# Patient Record
Sex: FEMALE | Race: WHITE | Employment: OTHER | URBAN - METROPOLITAN AREA
[De-identification: names, ages, dates, MRNs, and addresses within clinical notes are randomized per-mention and may not be internally consistent; named-entity substitution may affect disease eponyms.]

---

## 2018-03-26 PROBLEM — M47.812 CERVICAL FACET JOINT SYNDROME: Status: ACTIVE | Noted: 2018-03-26

## 2018-03-26 PROBLEM — G89.29 CHRONIC BILATERAL LOW BACK PAIN WITH RIGHT-SIDED SCIATICA: Status: ACTIVE | Noted: 2018-03-26

## 2018-03-26 PROBLEM — M54.2 NECK PAIN: Status: ACTIVE | Noted: 2018-03-26

## 2018-03-26 PROBLEM — M54.41 CHRONIC BILATERAL LOW BACK PAIN WITH RIGHT-SIDED SCIATICA: Status: ACTIVE | Noted: 2018-03-26

## 2018-03-26 PROBLEM — M51.369 LUMBAR DEGENERATIVE DISC DISEASE: Status: ACTIVE | Noted: 2018-03-26

## 2018-03-26 PROBLEM — M50.30 DEGENERATIVE CERVICAL DISC: Status: ACTIVE | Noted: 2018-03-26

## 2018-03-26 PROBLEM — M47.816 LUMBAR FACET ARTHROPATHY: Status: ACTIVE | Noted: 2018-03-26

## 2018-03-26 PROBLEM — M48.062 SPINAL STENOSIS OF LUMBAR REGION WITH NEUROGENIC CLAUDICATION: Status: ACTIVE | Noted: 2018-03-26

## 2018-03-26 PROBLEM — M51.36 LUMBAR DEGENERATIVE DISC DISEASE: Status: ACTIVE | Noted: 2018-03-26

## 2018-04-16 ENCOUNTER — OFFICE VISIT (OUTPATIENT)
Dept: PAIN MANAGEMENT | Age: 68
End: 2018-04-16
Payer: MEDICARE

## 2018-04-16 VITALS
HEART RATE: 74 BPM | TEMPERATURE: 98.1 F | DIASTOLIC BLOOD PRESSURE: 70 MMHG | SYSTOLIC BLOOD PRESSURE: 130 MMHG | RESPIRATION RATE: 18 BRPM

## 2018-04-16 DIAGNOSIS — M50.30 DEGENERATIVE CERVICAL DISC: Primary | ICD-10-CM

## 2018-04-16 DIAGNOSIS — M54.5 CHRONIC BILATERAL LOW BACK PAIN, WITH SCIATICA PRESENCE UNSPECIFIED: Chronic | ICD-10-CM

## 2018-04-16 DIAGNOSIS — M48.062 SPINAL STENOSIS OF LUMBAR REGION WITH NEUROGENIC CLAUDICATION: ICD-10-CM

## 2018-04-16 DIAGNOSIS — M51.36 LUMBAR DEGENERATIVE DISC DISEASE: ICD-10-CM

## 2018-04-16 DIAGNOSIS — M47.816 LUMBAR FACET ARTHROPATHY: ICD-10-CM

## 2018-04-16 DIAGNOSIS — G89.29 CHRONIC BILATERAL LOW BACK PAIN, WITH SCIATICA PRESENCE UNSPECIFIED: Chronic | ICD-10-CM

## 2018-04-16 DIAGNOSIS — M54.2 NECK PAIN: ICD-10-CM

## 2018-04-16 DIAGNOSIS — M47.812 CERVICAL FACET JOINT SYNDROME: ICD-10-CM

## 2018-04-16 PROCEDURE — 99213 OFFICE O/P EST LOW 20 MIN: CPT

## 2018-04-16 PROCEDURE — 99213 OFFICE O/P EST LOW 20 MIN: CPT | Performed by: PAIN MEDICINE

## 2018-04-16 ASSESSMENT — PATIENT HEALTH QUESTIONNAIRE - PHQ9
2. FEELING DOWN, DEPRESSED OR HOPELESS: 0
SUM OF ALL RESPONSES TO PHQ QUESTIONS 1-9: 0
SUM OF ALL RESPONSES TO PHQ9 QUESTIONS 1 & 2: 0
1. LITTLE INTEREST OR PLEASURE IN DOING THINGS: 0

## 2018-04-23 ENCOUNTER — HOSPITAL ENCOUNTER (OUTPATIENT)
Dept: OPERATING ROOM | Age: 68
Discharge: HOME | End: 2018-04-23
Payer: MEDICARE

## 2018-04-23 ENCOUNTER — HOSPITAL ENCOUNTER (OUTPATIENT)
Age: 68
Setting detail: OUTPATIENT SURGERY
Discharge: HOME | End: 2018-04-23
Attending: PAIN MEDICINE | Admitting: PAIN MEDICINE
Payer: MEDICARE

## 2018-04-23 VITALS
HEART RATE: 63 BPM | RESPIRATION RATE: 16 BRPM | OXYGEN SATURATION: 94 % | DIASTOLIC BLOOD PRESSURE: 73 MMHG | SYSTOLIC BLOOD PRESSURE: 184 MMHG

## 2018-04-23 DIAGNOSIS — M54.16 LUMBAR RADICULOPATHY: ICD-10-CM

## 2018-04-23 PROCEDURE — 7100000011 HC PHASE II RECOVERY - ADDTL 15 MIN: Performed by: PAIN MEDICINE

## 2018-04-23 PROCEDURE — 62323 NJX INTERLAMINAR LMBR/SAC: CPT | Performed by: PAIN MEDICINE

## 2018-04-23 PROCEDURE — 6360000004 HC RX CONTRAST MEDICATION: Performed by: PAIN MEDICINE

## 2018-04-23 PROCEDURE — 2500000003 HC RX 250 WO HCPCS: Performed by: PAIN MEDICINE

## 2018-04-23 PROCEDURE — 6360000002 HC RX W HCPCS: Performed by: PAIN MEDICINE

## 2018-04-23 PROCEDURE — 3600000005 HC SURGERY LEVEL 5 BASE: Performed by: PAIN MEDICINE

## 2018-04-23 PROCEDURE — 2709999900 HC NON-CHARGEABLE SUPPLY: Performed by: PAIN MEDICINE

## 2018-04-23 PROCEDURE — 3209999900 FLUORO FOR SURGICAL PROCEDURES

## 2018-04-23 PROCEDURE — 7100000010 HC PHASE II RECOVERY - FIRST 15 MIN: Performed by: PAIN MEDICINE

## 2018-04-23 RX ORDER — LIDOCAINE HYDROCHLORIDE 5 MG/ML
INJECTION, SOLUTION INFILTRATION; INTRAVENOUS PRN
Status: DISCONTINUED | OUTPATIENT
Start: 2018-04-23 | End: 2018-04-23 | Stop reason: HOSPADM

## 2018-04-23 ASSESSMENT — PAIN - FUNCTIONAL ASSESSMENT: PAIN_FUNCTIONAL_ASSESSMENT: 0-10

## 2018-04-23 ASSESSMENT — PAIN SCALES - GENERAL
PAINLEVEL_OUTOF10: 0
PAINLEVEL_OUTOF10: 0

## 2018-04-23 ASSESSMENT — PAIN DESCRIPTION - DESCRIPTORS: DESCRIPTORS: ACHING

## 2018-05-15 ENCOUNTER — OFFICE VISIT (OUTPATIENT)
Dept: PAIN MANAGEMENT | Age: 68
End: 2018-05-15
Payer: MEDICARE

## 2018-05-15 VITALS
SYSTOLIC BLOOD PRESSURE: 108 MMHG | TEMPERATURE: 98.3 F | DIASTOLIC BLOOD PRESSURE: 70 MMHG | RESPIRATION RATE: 16 BRPM | HEART RATE: 72 BPM

## 2018-05-15 DIAGNOSIS — M54.2 NECK PAIN: Primary | ICD-10-CM

## 2018-05-15 DIAGNOSIS — M54.5 CHRONIC BILATERAL LOW BACK PAIN, WITH SCIATICA PRESENCE UNSPECIFIED: ICD-10-CM

## 2018-05-15 DIAGNOSIS — M50.30 DEGENERATIVE CERVICAL DISC: ICD-10-CM

## 2018-05-15 DIAGNOSIS — M51.36 LUMBAR DEGENERATIVE DISC DISEASE: ICD-10-CM

## 2018-05-15 DIAGNOSIS — M47.816 LUMBAR FACET ARTHROPATHY: ICD-10-CM

## 2018-05-15 DIAGNOSIS — G89.29 CHRONIC BILATERAL LOW BACK PAIN, WITH SCIATICA PRESENCE UNSPECIFIED: ICD-10-CM

## 2018-05-15 DIAGNOSIS — M54.41 CHRONIC BILATERAL LOW BACK PAIN WITH RIGHT-SIDED SCIATICA: ICD-10-CM

## 2018-05-15 DIAGNOSIS — M47.812 CERVICAL FACET JOINT SYNDROME: ICD-10-CM

## 2018-05-15 DIAGNOSIS — M51.36 DDD (DEGENERATIVE DISC DISEASE), LUMBAR: ICD-10-CM

## 2018-05-15 DIAGNOSIS — G89.29 CHRONIC BILATERAL LOW BACK PAIN WITH RIGHT-SIDED SCIATICA: ICD-10-CM

## 2018-05-15 DIAGNOSIS — M48.061 NEURAL FORAMINAL STENOSIS OF LUMBAR SPINE: ICD-10-CM

## 2018-05-15 DIAGNOSIS — M48.062 SPINAL STENOSIS OF LUMBAR REGION WITH NEUROGENIC CLAUDICATION: ICD-10-CM

## 2018-05-15 PROCEDURE — 99213 OFFICE O/P EST LOW 20 MIN: CPT

## 2018-05-15 PROCEDURE — 99213 OFFICE O/P EST LOW 20 MIN: CPT | Performed by: PAIN MEDICINE

## 2018-06-20 ENCOUNTER — HOSPITAL ENCOUNTER (OUTPATIENT)
Dept: SLEEP CENTER | Age: 68
Discharge: HOME | End: 2018-06-20
Payer: MEDICARE

## 2018-06-20 DIAGNOSIS — G47.33 OSA (OBSTRUCTIVE SLEEP APNEA): Primary | ICD-10-CM

## 2018-06-20 PROCEDURE — 95810 POLYSOM 6/> YRS 4/> PARAM: CPT

## 2018-06-28 ENCOUNTER — HOSPITAL ENCOUNTER (OUTPATIENT)
Dept: GENERAL RADIOLOGY | Age: 68
Discharge: HOME | End: 2018-06-30
Payer: MEDICARE

## 2018-06-28 ENCOUNTER — HOSPITAL ENCOUNTER (OUTPATIENT)
Age: 68
Discharge: HOME | End: 2018-06-28
Payer: MEDICARE

## 2018-06-28 ENCOUNTER — HOSPITAL ENCOUNTER (OUTPATIENT)
Age: 68
Discharge: HOME | End: 2018-06-30
Payer: MEDICARE

## 2018-06-28 DIAGNOSIS — R05.9 COUGH: ICD-10-CM

## 2018-06-28 LAB
ALBUMIN SERPL-MCNC: 3.7 G/DL (ref 3.5–5.2)
ALP BLD-CCNC: 66 U/L (ref 35–104)
ALT SERPL-CCNC: 19 U/L (ref 0–32)
ANION GAP SERPL CALCULATED.3IONS-SCNC: 13 MMOL/L (ref 7–16)
AST SERPL-CCNC: 22 U/L (ref 0–31)
BASOPHILS ABSOLUTE: 0.04 E9/L (ref 0–0.2)
BASOPHILS RELATIVE PERCENT: 0.8 % (ref 0–2)
BILIRUB SERPL-MCNC: 0.3 MG/DL (ref 0–1.2)
BUN BLDV-MCNC: 14 MG/DL (ref 8–23)
CALCIUM SERPL-MCNC: 9.4 MG/DL (ref 8.6–10.2)
CHLORIDE BLD-SCNC: 102 MMOL/L (ref 98–107)
CHOLESTEROL, TOTAL: 160 MG/DL (ref 0–199)
CO2: 27 MMOL/L (ref 22–29)
CREAT SERPL-MCNC: 0.9 MG/DL (ref 0.5–1)
EOSINOPHILS ABSOLUTE: 0.15 E9/L (ref 0.05–0.5)
EOSINOPHILS RELATIVE PERCENT: 3.1 % (ref 0–6)
GFR AFRICAN AMERICAN: >60
GFR NON-AFRICAN AMERICAN: >60 ML/MIN/1.73
GLUCOSE BLD-MCNC: 132 MG/DL (ref 74–109)
HBA1C MFR BLD: 6 % (ref 4–5.6)
HCT VFR BLD CALC: 39 % (ref 34–48)
HDLC SERPL-MCNC: 66 MG/DL
HEMOGLOBIN: 12.4 G/DL (ref 11.5–15.5)
IMMATURE GRANULOCYTES #: 0 E9/L
IMMATURE GRANULOCYTES %: 0 % (ref 0–5)
LDL CHOLESTEROL CALCULATED: 73 MG/DL (ref 0–99)
LYMPHOCYTES ABSOLUTE: 1.45 E9/L (ref 1.5–4)
LYMPHOCYTES RELATIVE PERCENT: 30.2 % (ref 20–42)
MCH RBC QN AUTO: 27.9 PG (ref 26–35)
MCHC RBC AUTO-ENTMCNC: 31.8 % (ref 32–34.5)
MCV RBC AUTO: 87.8 FL (ref 80–99.9)
MONOCYTES ABSOLUTE: 0.4 E9/L (ref 0.1–0.95)
MONOCYTES RELATIVE PERCENT: 8.3 % (ref 2–12)
NEUTROPHILS ABSOLUTE: 2.76 E9/L (ref 1.8–7.3)
NEUTROPHILS RELATIVE PERCENT: 57.6 % (ref 43–80)
PDW BLD-RTO: 14.5 FL (ref 11.5–15)
PLATELET # BLD: 261 E9/L (ref 130–450)
PMV BLD AUTO: 11.3 FL (ref 7–12)
POTASSIUM SERPL-SCNC: 4.3 MMOL/L (ref 3.5–5)
RBC # BLD: 4.44 E12/L (ref 3.5–5.5)
SODIUM BLD-SCNC: 142 MMOL/L (ref 132–146)
T4 TOTAL: 6.2 MCG/DL (ref 4.5–11.7)
TOTAL PROTEIN: 6.9 G/DL (ref 6.4–8.3)
TRIGL SERPL-MCNC: 104 MG/DL (ref 0–149)
TSH SERPL DL<=0.05 MIU/L-ACNC: 3.37 UIU/ML (ref 0.27–4.2)
URIC ACID, SERUM: 5.6 MG/DL (ref 2.4–5.7)
VITAMIN B-12: 530 PG/ML (ref 211–946)
VITAMIN D 25-HYDROXY: 54 NG/ML (ref 30–100)
VLDLC SERPL CALC-MCNC: 21 MG/DL
WBC # BLD: 4.8 E9/L (ref 4.5–11.5)

## 2018-06-28 PROCEDURE — 80061 LIPID PANEL: CPT

## 2018-06-28 PROCEDURE — 83036 HEMOGLOBIN GLYCOSYLATED A1C: CPT

## 2018-06-28 PROCEDURE — 84550 ASSAY OF BLOOD/URIC ACID: CPT

## 2018-06-28 PROCEDURE — 82607 VITAMIN B-12: CPT

## 2018-06-28 PROCEDURE — 84436 ASSAY OF TOTAL THYROXINE: CPT

## 2018-06-28 PROCEDURE — 80053 COMPREHEN METABOLIC PANEL: CPT

## 2018-06-28 PROCEDURE — 36415 COLL VENOUS BLD VENIPUNCTURE: CPT

## 2018-06-28 PROCEDURE — 85025 COMPLETE CBC W/AUTO DIFF WBC: CPT

## 2018-06-28 PROCEDURE — 71046 X-RAY EXAM CHEST 2 VIEWS: CPT

## 2018-06-28 PROCEDURE — 84443 ASSAY THYROID STIM HORMONE: CPT

## 2018-06-28 PROCEDURE — 82306 VITAMIN D 25 HYDROXY: CPT

## 2018-07-02 ENCOUNTER — TELEPHONE (OUTPATIENT)
Dept: PAIN MANAGEMENT | Age: 68
End: 2018-07-02

## 2018-07-02 DIAGNOSIS — M54.16 LUMBAR RADICULOPATHY: Primary | ICD-10-CM

## 2018-07-03 NOTE — TELEPHONE ENCOUNTER
Carmina Sheehan notified of MRI, as soon as she has it scheduled she will call and make an appointment to see Dr. Shreyas Magana.

## 2018-07-17 ENCOUNTER — PREP FOR PROCEDURE (OUTPATIENT)
Dept: PAIN MANAGEMENT | Age: 68
End: 2018-07-17

## 2018-07-17 ENCOUNTER — OFFICE VISIT (OUTPATIENT)
Dept: PAIN MANAGEMENT | Age: 68
End: 2018-07-17
Payer: MEDICARE

## 2018-07-17 VITALS
TEMPERATURE: 98.4 F | WEIGHT: 250 LBS | SYSTOLIC BLOOD PRESSURE: 124 MMHG | BODY MASS INDEX: 41.65 KG/M2 | HEART RATE: 76 BPM | RESPIRATION RATE: 16 BRPM | DIASTOLIC BLOOD PRESSURE: 76 MMHG | HEIGHT: 65 IN

## 2018-07-17 DIAGNOSIS — M51.36 LUMBAR DEGENERATIVE DISC DISEASE: ICD-10-CM

## 2018-07-17 DIAGNOSIS — M48.062 SPINAL STENOSIS OF LUMBAR REGION WITH NEUROGENIC CLAUDICATION: ICD-10-CM

## 2018-07-17 DIAGNOSIS — M54.41 CHRONIC BILATERAL LOW BACK PAIN WITH RIGHT-SIDED SCIATICA: ICD-10-CM

## 2018-07-17 DIAGNOSIS — G89.29 CHRONIC BILATERAL LOW BACK PAIN WITH RIGHT-SIDED SCIATICA: ICD-10-CM

## 2018-07-17 DIAGNOSIS — M47.812 CERVICAL FACET JOINT SYNDROME: ICD-10-CM

## 2018-07-17 DIAGNOSIS — M47.816 LUMBAR FACET ARTHROPATHY: Primary | ICD-10-CM

## 2018-07-17 DIAGNOSIS — M50.30 DEGENERATIVE CERVICAL DISC: ICD-10-CM

## 2018-07-17 DIAGNOSIS — G89.29 CHRONIC BILATERAL LOW BACK PAIN, WITH SCIATICA PRESENCE UNSPECIFIED: ICD-10-CM

## 2018-07-17 DIAGNOSIS — M51.36 DDD (DEGENERATIVE DISC DISEASE), LUMBAR: Chronic | ICD-10-CM

## 2018-07-17 DIAGNOSIS — M54.16 LUMBAR RADICULOPATHY: Chronic | ICD-10-CM

## 2018-07-17 DIAGNOSIS — M54.5 CHRONIC BILATERAL LOW BACK PAIN, WITH SCIATICA PRESENCE UNSPECIFIED: ICD-10-CM

## 2018-07-17 DIAGNOSIS — M54.2 NECK PAIN: ICD-10-CM

## 2018-07-17 PROCEDURE — 99214 OFFICE O/P EST MOD 30 MIN: CPT | Performed by: PAIN MEDICINE

## 2018-07-17 RX ORDER — SODIUM CHLORIDE 0.9 % (FLUSH) 0.9 %
10 SYRINGE (ML) INJECTION EVERY 12 HOURS SCHEDULED
Status: CANCELLED | OUTPATIENT
Start: 2018-07-17 | End: 2019-07-17

## 2018-07-17 RX ORDER — SODIUM CHLORIDE 0.9 % (FLUSH) 0.9 %
10 SYRINGE (ML) INJECTION PRN
Status: CANCELLED | OUTPATIENT
Start: 2018-07-17 | End: 2019-07-17

## 2018-07-17 RX ORDER — PANTOPRAZOLE SODIUM 40 MG/1
40 TABLET, DELAYED RELEASE ORAL DAILY
COMMUNITY
End: 2018-12-27

## 2018-07-17 NOTE — PROGRESS NOTES
[address] [facility], [address] [place] [address] Sergio  [phone]    Follow up Note      Hattie Denis     Date of Visit:  7/17/2018    CC:  Patient presents for follow up   Chief Complaint   Patient presents with    Follow-up    Back Pain     low back pain    Pain    Neck Pain    Leg Pain     right leg pain       HPI:    Pain is unchanged   On average,pain is perceived as moderate (4-6 pain scale). Change in quality of symptoms:no. Patient satisfaction with analgesia:poor. Medication side effects:not applicable . Recent diagnostic testing:Lumbar spine MRI  Recent interventional procedures:none    She has been on anticoagulation medications to include ASA and has not been on herbal supplements. She is diabetic.     Imaging:  Lumbar spine CT 2014  IMPRESSION: Multilevel disc bulging and facet joint arthropathy   with spondylosis resulting in varying degrees of foraminal and   spinal canal stenosis    Cervical spine Xray 06/2015  Multilevel spondylosis  Lumbar spine MRI 07/2018  1. Levoconvex curvature with multilevel lumbar spondylosis, as   described above, most pronounced at L4-L5, where there is a diffuse   disc bulge with facet hypertrophy resulting in severe central canal   narrowing. 2. Mild central canal narrowing at L2-L3 and L3-L4. 3. Mild to moderate right foraminal narrowing at L2-L3. 4. Mild bilateral foraminal narrowing at L4-L5. Mild right foraminal   narrowing at T12-L1 and L5-S1.      Previous treatments: Physical Therapy, Epidural Steroid Injection and medications. .         Potential Aberrant Drug-Related Behavior: No    Urine Drug Screening: None (no medications)    OARRS report:  04/2018 consistent   05/2018 consistent   07/2018 consistent     Allergies   Allergen Reactions    Penicillins Rash       Prior to Admission medications    Medication Sig Start Date End Date Taking?  Authorizing Provider   pantoprazole (PROTONIX) 40 MG tablet Take 40 mg by mouth daily   Yes Historical Provider, MD   gabapentin (NEURONTIN) 300 MG capsule Take 300 mg by mouth 3 times daily Take am dos 01/12 . Yes Historical Provider, MD   metFORMIN (GLUCOPHAGE-XR) 500 MG extended release tablet Take 500 mg by mouth 2 times daily   Yes Historical Provider, MD   pravastatin (PRAVACHOL) 20 MG tablet Take 20 mg by mouth nightly   Yes Historical Provider, MD   venlafaxine 37.5 MG extended release tablet Take 37.5 mg by mouth 2 times daily Take am dos 01/12   Yes Historical Provider, MD   aspirin 81 MG tablet Take 81 mg by mouth daily Per PCP; contact Dr Yesy Urban re:  preop instructions   Yes Historical Provider, MD   vitamin D (CHOLECALCIFEROL) 1000 UNIT TABS tablet Take 1,000 Units by mouth 2 times daily Ld 01/06   Yes Historical Provider, MD   lisinopril (PRINIVIL;ZESTRIL) 10 MG tablet Take 10 mg by mouth daily Instructed to take am of procedure, 01/12   Yes Historical Provider, MD   Multiple Vitamin (MULTIVITAMIN PO) Take 1 tablet by mouth daily LD 01/06   Yes Historical Provider, MD   fish oil-omega-3 fatty acids 1000 MG capsule Take 2 g by mouth 2 times daily LD 01/06   Yes Historical Provider, MD   Calcium Carbonate-Vit D-Min (CALCIUM 1200 PO) Take 600 mg by mouth 2 times daily LD 01/06   Yes Historical Provider, MD   Sod Fluoride-Ca Carbonate (FLORICAL PO) Take 1 tablet by mouth 2 times daily    Historical Provider, MD          REVIEW OF SYSTEMS:     Tosha Huber denies fever/chills, chest pain, shortness of breath, new bowel or bladder complaints or suicidal ideations. All other review of systems was negative. PHYSICAL EXAMINATION:      /76   Pulse 76   Temp 98.4 °F (36.9 °C) (Oral)   Resp 16   Ht 5' 5\" (1.651 m)   Wt 250 lb (113.4 kg)   BMI 41.60 kg/m²      General:       General appearance: awake, alert, oriented   elderly, pleasant and well-hydrated.    , in moderate distress and A & O x3  Build:Overweight  Function:Rises from a seated position with reflex2+  Right Biceps reflex2+  Left Biceps reflex2+  Right Triceps reflex2+  Left Triceps reflex2+  Right Quadriceps reflex2+  Left Quadriceps reflexNOT DONE  Right Achilles reflex0  Left Achilles reflex0  Gait:antalgic     Dermatology:     Skin:no unusual rashes and no skin lesions      Assessment/Plan:  Chronic neck and low back pain   Low back is consistent with degenerative spinal stenosis with neurogenic claudications  Results of lumbar spine MRI were discussed with the patient  Discussed diagnostic lumbar facet MBB on the Right side at L2,3,4 MBB, patient agrees will schedule   Will consider referral to a neurosurgeon if less than expected response  Axial neck pain, will schedule patient for cervical Left C3,4,5 MBB after addressing her low back pain  OARRS report reviewed  Patient encouraged to stay active and to lose weight  Treatment plan discussed with the patient including medications and procedure side effects            ccreferring fernanda Cordon M.D.

## 2018-08-09 ENCOUNTER — HOSPITAL ENCOUNTER (OUTPATIENT)
Age: 68
Setting detail: OUTPATIENT SURGERY
Discharge: HOME | End: 2018-08-09
Attending: PAIN MEDICINE | Admitting: PAIN MEDICINE
Payer: MEDICARE

## 2018-08-09 ENCOUNTER — HOSPITAL ENCOUNTER (OUTPATIENT)
Dept: OPERATING ROOM | Age: 68
Discharge: HOME | End: 2018-08-09
Payer: MEDICARE

## 2018-08-09 VITALS
HEART RATE: 71 BPM | DIASTOLIC BLOOD PRESSURE: 86 MMHG | RESPIRATION RATE: 18 BRPM | OXYGEN SATURATION: 93 % | SYSTOLIC BLOOD PRESSURE: 172 MMHG

## 2018-08-09 DIAGNOSIS — M47.896 OTHER OSTEOARTHRITIS OF SPINE, LUMBAR REGION: ICD-10-CM

## 2018-08-09 PROCEDURE — 6360000002 HC RX W HCPCS: Performed by: PAIN MEDICINE

## 2018-08-09 PROCEDURE — 2709999900 HC NON-CHARGEABLE SUPPLY: Performed by: PAIN MEDICINE

## 2018-08-09 PROCEDURE — 7100000011 HC PHASE II RECOVERY - ADDTL 15 MIN: Performed by: PAIN MEDICINE

## 2018-08-09 PROCEDURE — 3209999900 FLUORO FOR SURGICAL PROCEDURES

## 2018-08-09 PROCEDURE — 64495 INJ PARAVERT F JNT L/S 3 LEV: CPT | Performed by: PAIN MEDICINE

## 2018-08-09 PROCEDURE — 64494 INJ PARAVERT F JNT L/S 2 LEV: CPT | Performed by: PAIN MEDICINE

## 2018-08-09 PROCEDURE — 2500000003 HC RX 250 WO HCPCS: Performed by: PAIN MEDICINE

## 2018-08-09 PROCEDURE — 64493 INJ PARAVERT F JNT L/S 1 LEV: CPT | Performed by: PAIN MEDICINE

## 2018-08-09 PROCEDURE — 7100000010 HC PHASE II RECOVERY - FIRST 15 MIN: Performed by: PAIN MEDICINE

## 2018-08-09 PROCEDURE — 3600000005 HC SURGERY LEVEL 5 BASE: Performed by: PAIN MEDICINE

## 2018-08-09 RX ORDER — LIDOCAINE HYDROCHLORIDE 5 MG/ML
INJECTION, SOLUTION INFILTRATION; INTRAVENOUS PRN
Status: DISCONTINUED | OUTPATIENT
Start: 2018-08-09 | End: 2018-08-09 | Stop reason: HOSPADM

## 2018-08-09 RX ORDER — SODIUM CHLORIDE 0.9 % (FLUSH) 0.9 %
10 SYRINGE (ML) INJECTION PRN
Status: DISCONTINUED | OUTPATIENT
Start: 2018-08-09 | End: 2018-08-09 | Stop reason: HOSPADM

## 2018-08-09 RX ORDER — SODIUM CHLORIDE 0.9 % (FLUSH) 0.9 %
10 SYRINGE (ML) INJECTION EVERY 12 HOURS SCHEDULED
Status: DISCONTINUED | OUTPATIENT
Start: 2018-08-09 | End: 2018-08-09 | Stop reason: HOSPADM

## 2018-08-09 ASSESSMENT — PAIN - FUNCTIONAL ASSESSMENT: PAIN_FUNCTIONAL_ASSESSMENT: 0-10

## 2018-08-09 ASSESSMENT — PAIN DESCRIPTION - DESCRIPTORS: DESCRIPTORS: ACHING;BURNING;CONSTANT

## 2018-08-09 ASSESSMENT — PAIN SCALES - GENERAL: PAINLEVEL_OUTOF10: 4

## 2018-08-09 NOTE — OP NOTE
where the medial branch traverses under fluoroscopic guidance. Once bone was contacted and negative aspiration was confirmed. A solution of 0.25% marcaine with 40 DepoMedrol 3 cc was then injected and distributed equally at each level. Following the procedure Janes Martinez noted improvement of previous pain symptoms. Disposition the patient tolerated the procedure well and there were no complications . Vital signs remained stable throughout the procedure. The patient was escorted to the recovery area where they remained until discharge and written discharge instructions for the procedure were given. Plan: Janes Martinez will return to our pain management center as scheduled.      Denise Garcia MD

## 2018-08-09 NOTE — H&P
[address]  [address] [facility], [address] [place] [address] Sergio  [phone]    Procedure History & Physical      Polodoug Maggy     HPI:    Patient  is here for axial low back pain for Right L2,3,4 MBB   Labs/imaging studies reviewed   All question and concerns addressed including R/B/A associated with the procedure    Past Medical History:   Diagnosis Date    Arthritis     Asthma     no inhaler, controlled ; hist years ago of asthmatic bronchitis, denies problems since    Depression     Diabetes mellitus ([address]     History of blood transfusion     Hyperlipidemia     Hypertension     Right cataract 2018       Past Surgical History:   Procedure Laterality Date    ABDOMEN SURGERY      CATARACT REMOVAL WITH IMPLANT Right 2018     SECTION      COLONOSCOPY      EYE SURGERY      bilateral cataracts    FRACTURE SURGERY      left foot    JOINT REPLACEMENT      total left knee    KNEE ARTHROPLASTY Left 2013    totoal joint arthroplasty left knee with platelet gel and nerve block    NERVE BLOCK  7 11 12    caudal #1    NERVE BLOCK  7  12    caudal #2    NERVE BLOCK  12    therapeutic caudal with epiduragram    NERVE BLOCK  2018    lumbar epidural injection #1    POLYPECTOMY      removed from uterus    [place] ALLIE NOSE/CLEFT LIP/TIP N/A 2018    L3-4 LUMBAR EPIDURAL STEROID INJECTION #1 performed by Alberto Brown MD at [facility]         Prior to Admission medications    Medication Sig Start Date End Date Taking? Authorizing Provider   pantoprazole (PROTONIX) 40 MG tablet Take 40 mg by mouth daily   Yes Historical Provider, MD   Sod Fluoride-Ca Carbonate (FLORICAL PO) Take 1 tablet by mouth 2 times daily   Yes Historical Provider, MD   gabapentin (NEURONTIN) 300 MG capsule Take 300 mg by mouth 3 times daily Take am dos  .    Yes Historical Provider, MD   metFORMIN (GLUCOPHAGE-XR) 500 MG extended release tablet Take 500 mg by mouth 2 times daily   Yes Historical Provider, MD   pravastatin (PRAVACHOL) 20 MG tablet Take 20 mg by mouth nightly   Yes Historical Provider, MD   venlafaxine 37.5 MG extended release tablet Take 37.5 mg by mouth 2 times daily Take am dos 01/12   Yes Historical Provider, MD   aspirin 81 MG tablet Take 81 mg by mouth daily Per PCP; contact Dr Lisa Pardo re:  preop instructions   Yes Historical Provider, MD   vitamin D (CHOLECALCIFEROL) 1000 UNIT TABS tablet Take 1,000 Units by mouth 2 times daily Ld 01/06   Yes Historical Provider, MD   lisinopril (PRINIVIL;ZESTRIL) 10 MG tablet Take 10 mg by mouth daily Instructed to take am of procedure, 01/12   Yes Historical Provider, MD   Multiple Vitamin (MULTIVITAMIN PO) Take 1 tablet by mouth daily LD 01/06   Yes Historical Provider, MD   fish oil-omega-3 fatty acids 1000 MG capsule Take 2 g by mouth 2 times daily LD 01/06   Yes Historical Provider, MD   Calcium Carbonate-Vit D-Min (CALCIUM 1200 PO) Take 600 mg by mouth 2 times daily LD 01/06   Yes Historical Provider, MD       Allergies   Allergen Reactions    Penicillins Rash       Social History     Social History    Marital status:      Spouse name: N/A    Number of children: N/A    Years of education: N/A     Occupational History    Not on file.      Social History Main Topics    Smoking status: Former Smoker     Packs/day: 2.00     Years: 25.00     Types: Cigarettes    Smokeless tobacco: Never Used      Comment: quit in 1981    Alcohol use No      Comment: infrequent wine    Drug use: No    Sexual activity: Not on file     Other Topics Concern    Not on file     Social History Narrative    No narrative on file       Family History   Problem Relation Age of Onset    Cancer Other     Diabetes Other     Heart Disease Other     Hypertension Other     Diabetes Mother     Mental Illness Mother     Stroke Father     High Blood Pressure Father          REVIEW OF SYSTEMS:    CONSTITUTIONAL: negative for  fevers, chills, sweats and fatigue    RESPIRATORY:  negative for  dry cough, cough with sputum, dyspnea, wheezing and chest pain    CARDIOVASCULAR:  negative for chest pain, dyspnea, palpitations, syncope    GASTROINTESTINAL:  negative for nausea, vomiting, change in bowel habits, diarrhea, constipation and abdominal pain    MUSCULOSKELETAL: negative for muscle weakness    SKIN: negative for itching or rashes. BEHAVIOR/PSYCH:  negative for poor appetite, increased appetite, decreased sleep and poor concentration    All other systems negative      PHYSICAL EXAM:    VITALS:  BP (!) 160/67   Pulse 77   Resp 20   SpO2 94%     CONSTITUTIONAL:  awake, alert, cooperative, no apparent distress, and appears stated age    EYES: PERRLA, EOMI    LUNGS:  No increased work of breathing, no audible wheezing    CARDIOVASCULAR:  regular rate and rhythm    ABDOMEN:  Soft non tender non distended     EXTREMITIES: no signs of clubbing or cyanosis. MUSCULOSKELETAL: negative for flaccid muscle tone or spastic movements. SKIN: gross examination reveals no signs of rashes, or diaphoresis. NEURO: Cranial nerves II-XII grossly intact. No signs of agitated mood.        Assessment/Plan:    Axial low back pain for Right L2,3,4 MBB

## 2018-11-15 ENCOUNTER — HOSPITAL ENCOUNTER (OUTPATIENT)
Dept: MAMMOGRAPHY | Age: 68
Discharge: HOME | End: 2018-11-17
Payer: MEDICARE

## 2018-11-15 DIAGNOSIS — Z78.0 POST-MENOPAUSAL: ICD-10-CM

## 2018-11-15 DIAGNOSIS — Z12.39 BREAST CANCER SCREENING: ICD-10-CM

## 2018-11-15 PROCEDURE — 77063 BREAST TOMOSYNTHESIS BI: CPT

## 2018-11-15 PROCEDURE — 77080 DXA BONE DENSITY AXIAL: CPT

## 2018-11-16 DIAGNOSIS — M25.552 BILATERAL HIP PAIN: Primary | ICD-10-CM

## 2018-11-16 DIAGNOSIS — M25.551 BILATERAL HIP PAIN: Primary | ICD-10-CM

## 2018-11-19 ENCOUNTER — OFFICE VISIT (OUTPATIENT)
Dept: ORTHOPEDIC SURGERY | Age: 68
End: 2018-11-19
Payer: MEDICARE

## 2018-11-19 VITALS — WEIGHT: 220 LBS | BODY MASS INDEX: 37.56 KG/M2 | HEIGHT: 64 IN

## 2018-11-19 DIAGNOSIS — M48.062 SPINAL STENOSIS OF LUMBAR REGION WITH NEUROGENIC CLAUDICATION: Primary | ICD-10-CM

## 2018-11-19 PROCEDURE — 99213 OFFICE O/P EST LOW 20 MIN: CPT | Performed by: ORTHOPAEDIC SURGERY

## 2018-11-19 PROCEDURE — 1090F PRES/ABSN URINE INCON ASSESS: CPT | Performed by: ORTHOPAEDIC SURGERY

## 2018-11-19 PROCEDURE — 1101F PT FALLS ASSESS-DOCD LE1/YR: CPT | Performed by: ORTHOPAEDIC SURGERY

## 2018-11-19 PROCEDURE — 3017F COLORECTAL CA SCREEN DOC REV: CPT | Performed by: ORTHOPAEDIC SURGERY

## 2018-11-19 PROCEDURE — 1123F ACP DISCUSS/DSCN MKR DOCD: CPT | Performed by: ORTHOPAEDIC SURGERY

## 2018-11-19 PROCEDURE — 1036F TOBACCO NON-USER: CPT | Performed by: ORTHOPAEDIC SURGERY

## 2018-11-19 PROCEDURE — G8484 FLU IMMUNIZE NO ADMIN: HCPCS | Performed by: ORTHOPAEDIC SURGERY

## 2018-11-19 PROCEDURE — G8417 CALC BMI ABV UP PARAM F/U: HCPCS | Performed by: ORTHOPAEDIC SURGERY

## 2018-11-19 PROCEDURE — 4040F PNEUMOC VAC/ADMIN/RCVD: CPT | Performed by: ORTHOPAEDIC SURGERY

## 2018-11-19 PROCEDURE — G8399 PT W/DXA RESULTS DOCUMENT: HCPCS | Performed by: ORTHOPAEDIC SURGERY

## 2018-11-19 PROCEDURE — G8428 CUR MEDS NOT DOCUMENT: HCPCS | Performed by: ORTHOPAEDIC SURGERY

## 2018-11-19 RX ORDER — SOLIFENACIN SUCCINATE 10 MG/1
TABLET, FILM COATED ORAL
Refills: 5 | COMMUNITY
Start: 2018-09-10 | End: 2018-12-27

## 2018-11-19 RX ORDER — VENLAFAXINE HYDROCHLORIDE 75 MG/1
1 CAPSULE, EXTENDED RELEASE ORAL 2 TIMES DAILY
COMMUNITY
Start: 2018-07-29 | End: 2022-09-12 | Stop reason: SDUPTHER

## 2018-11-19 NOTE — PROGRESS NOTES
Other Topics Concern    Not on file     Social History Narrative    No narrative on file     Family History   Problem Relation Age of Onset    Cancer Other     Diabetes Other     Heart Disease Other     Hypertension Other     Diabetes Mother     Mental Illness Mother     Stroke Father     High Blood Pressure Father          REVIEW OF SYSTEMS:     General/Constitution:  (-)weight loss, (-)fever, (-)chills, (-)weakness. Skin: (-) rash,(-) psoriasis,(-) eczema, (-)skin cancer. Musculoskeletal: (-) fractures,  (-) dislocations,(-) collagen vascular disease, (-) fibromyalgia, (-) multiple sclerosis, (-) muscular dystrophy, (-) RSD,(-) joint pain (-)swelling, (-) joint pain,swelling. Neurologic: (-) epilepsy, (-)seizures,(-) brain tumor,(-) TIA, (-)stroke, (-)headaches, (-)Parkinson disease,(-) memory loss, (-) LOC. Cardiovascular: (-) Chest pain, (-) swelling in legs/feet, (-) SOB, (-) cramping in legs/feet with walking. Respiratory: (-) SOB, (-) Coughing, (-) night sweats. GI: (-) nausea, (-) vomiting, (-) diarrhea, (-) blood in stool, (-) gastric ulcer. Psychiatric: (-) Depression, (-) Anxiety, (-) bipolar disease, (-) Alzheimer's Disease  Allergic/Immunologic: (-) allergies latex, (-) allergies metal, (-) skin sensitivity. Hematlogic: (-) anemia, (-) blood transfusion, (-) DVT/PE, (-) Clotting disorders      Subjective:      Constitution:    Ht. 5 ft 4 in. , Wt. 220 lbs. Psycihatric:    The patient is alert and oriented x 3, appears to be stated age and in no distress. Respiratory:    Respiratory effort is not labored. Patient is not gasping. Palpation of the chest reveals no tactile fremitus. Skin:    Upon inspection: the skin appears warm, dry and intact. There is not a previous scar over the affected area. There is not any cellulitis, lymphedema or cutaneous lesions noted in the lower extremities. Upon palpation there is no induration noted.           Neurologic:      Motor

## 2018-12-27 ENCOUNTER — OFFICE VISIT (OUTPATIENT)
Dept: PAIN MANAGEMENT | Age: 68
End: 2018-12-27
Payer: MEDICARE

## 2018-12-27 VITALS
TEMPERATURE: 98.6 F | DIASTOLIC BLOOD PRESSURE: 62 MMHG | OXYGEN SATURATION: 97 % | SYSTOLIC BLOOD PRESSURE: 120 MMHG | HEIGHT: 64 IN | RESPIRATION RATE: 16 BRPM | HEART RATE: 76 BPM | BODY MASS INDEX: 37.56 KG/M2 | WEIGHT: 220 LBS

## 2018-12-27 DIAGNOSIS — M48.061 NEURAL FORAMINAL STENOSIS OF LUMBAR SPINE: Chronic | ICD-10-CM

## 2018-12-27 DIAGNOSIS — M51.36 DDD (DEGENERATIVE DISC DISEASE), LUMBAR: Chronic | ICD-10-CM

## 2018-12-27 DIAGNOSIS — M54.41 CHRONIC BILATERAL LOW BACK PAIN WITH RIGHT-SIDED SCIATICA: Primary | ICD-10-CM

## 2018-12-27 DIAGNOSIS — G89.29 CHRONIC BILATERAL LOW BACK PAIN WITH RIGHT-SIDED SCIATICA: Primary | ICD-10-CM

## 2018-12-27 DIAGNOSIS — M48.07 SPINAL STENOSIS OF LUMBOSACRAL REGION: ICD-10-CM

## 2018-12-27 PROCEDURE — 1123F ACP DISCUSS/DSCN MKR DOCD: CPT | Performed by: NURSE PRACTITIONER

## 2018-12-27 PROCEDURE — 1090F PRES/ABSN URINE INCON ASSESS: CPT | Performed by: NURSE PRACTITIONER

## 2018-12-27 PROCEDURE — G8484 FLU IMMUNIZE NO ADMIN: HCPCS | Performed by: NURSE PRACTITIONER

## 2018-12-27 PROCEDURE — 1036F TOBACCO NON-USER: CPT | Performed by: NURSE PRACTITIONER

## 2018-12-27 PROCEDURE — 99213 OFFICE O/P EST LOW 20 MIN: CPT | Performed by: NURSE PRACTITIONER

## 2018-12-27 PROCEDURE — 1101F PT FALLS ASSESS-DOCD LE1/YR: CPT | Performed by: NURSE PRACTITIONER

## 2018-12-27 PROCEDURE — 3017F COLORECTAL CA SCREEN DOC REV: CPT | Performed by: NURSE PRACTITIONER

## 2018-12-27 PROCEDURE — G8427 DOCREV CUR MEDS BY ELIG CLIN: HCPCS | Performed by: NURSE PRACTITIONER

## 2018-12-27 PROCEDURE — G8417 CALC BMI ABV UP PARAM F/U: HCPCS | Performed by: NURSE PRACTITIONER

## 2018-12-27 PROCEDURE — 4040F PNEUMOC VAC/ADMIN/RCVD: CPT | Performed by: NURSE PRACTITIONER

## 2018-12-27 PROCEDURE — G8399 PT W/DXA RESULTS DOCUMENT: HCPCS | Performed by: NURSE PRACTITIONER

## 2018-12-27 RX ORDER — HYDROCODONE BITARTRATE AND ACETAMINOPHEN 5; 325 MG/1; MG/1
1 TABLET ORAL DAILY PRN
Qty: 30 TABLET | Refills: 0 | Status: SHIPPED | OUTPATIENT
Start: 2018-12-27 | End: 2019-01-23 | Stop reason: SDUPTHER

## 2018-12-27 NOTE — PROGRESS NOTES
bulge asymmetric to the left. Bilateral facet  hypertrophy and ligamentum flavum infolding. Mild central canal and  mild to moderate right foraminal narrowing. No left foraminal  narrowing. L3-L4: Mild diffuse disc bulge. Bilateral facet hypertrophy and  ligamentum flavum infolding. Mild central canal narrowing. No  foraminal narrowing. L4-L5: Diffuse disc bulge. Bilateral facet hypertrophy and ligamentum  flavum infolding. Severe central canal narrowing. Mild bilateral  foraminal narrowing. L5-S1: Minimal disc bulge with superimposed central annular fissure. Mild left facet hypertrophy. No central canal or right foraminal  narrowing. Mild left foraminal narrowing. Visualized paravertebral soft tissues are grossly unremarkable. Impression 1. Levoconvex curvature with multilevel lumbar spondylosis, as  described above, most pronounced at L4-L5, where there is a diffuse  disc bulge with facet hypertrophy resulting in severe central canal  narrowing. 2. Mild central canal narrowing at L2-L3 and L3-L4. 3. Mild to moderate right foraminal narrowing at L2-L3. 4. Mild bilateral foraminal narrowing at L4-L5. Mild right foraminal  narrowing at T12-L1 and L5-S1. Lumbar spine CT 2014  IMPRESSION: Multilevel disc bulging and facet joint arthropathy   with spondylosis resulting in varying degrees of foraminal and   spinal canal stenosis      Cervical spine Xray 06/2015  Multilevel spondylosis    All previous imaging has been reviewed        Potential Aberrant Drug-Related Behavior:  none    Urine Drug Screening:  Not on opiates    OARRS report  04/2018-12/2018 Consistent    Controlled Substances Monitoring:     RX Monitoring 12/27/2018   Attestation The Prescription Monitoring Report for this patient was reviewed today. Chronic Pain Reviewed the patient's functional status and documentation. Medication Contracts Existing medication contract.        Past Medical History:   Diagnosis Date    Arthritis

## 2019-01-02 ENCOUNTER — HOSPITAL ENCOUNTER (OUTPATIENT)
Age: 69
Discharge: HOME | End: 2019-01-04
Payer: MEDICARE

## 2019-01-02 ENCOUNTER — HOSPITAL ENCOUNTER (OUTPATIENT)
Dept: GENERAL RADIOLOGY | Age: 69
Discharge: HOME | End: 2019-01-04
Payer: MEDICARE

## 2019-01-02 DIAGNOSIS — R05.9 COUGH: ICD-10-CM

## 2019-01-02 PROCEDURE — 71046 X-RAY EXAM CHEST 2 VIEWS: CPT

## 2019-01-23 ENCOUNTER — OFFICE VISIT (OUTPATIENT)
Dept: PAIN MANAGEMENT | Age: 69
End: 2019-01-23
Payer: MEDICARE

## 2019-01-23 VITALS
WEIGHT: 220 LBS | SYSTOLIC BLOOD PRESSURE: 130 MMHG | OXYGEN SATURATION: 97 % | RESPIRATION RATE: 18 BRPM | HEART RATE: 82 BPM | HEIGHT: 65 IN | TEMPERATURE: 98.2 F | BODY MASS INDEX: 36.65 KG/M2 | DIASTOLIC BLOOD PRESSURE: 68 MMHG

## 2019-01-23 DIAGNOSIS — M51.36 DDD (DEGENERATIVE DISC DISEASE), LUMBAR: Chronic | ICD-10-CM

## 2019-01-23 DIAGNOSIS — M48.061 NEURAL FORAMINAL STENOSIS OF LUMBAR SPINE: Chronic | ICD-10-CM

## 2019-01-23 DIAGNOSIS — M54.41 CHRONIC BILATERAL LOW BACK PAIN WITH RIGHT-SIDED SCIATICA: ICD-10-CM

## 2019-01-23 DIAGNOSIS — M48.07 SPINAL STENOSIS OF LUMBOSACRAL REGION: ICD-10-CM

## 2019-01-23 DIAGNOSIS — G89.29 CHRONIC BILATERAL LOW BACK PAIN WITH RIGHT-SIDED SCIATICA: ICD-10-CM

## 2019-01-23 PROCEDURE — 99213 OFFICE O/P EST LOW 20 MIN: CPT | Performed by: NURSE PRACTITIONER

## 2019-01-23 PROCEDURE — 3017F COLORECTAL CA SCREEN DOC REV: CPT | Performed by: NURSE PRACTITIONER

## 2019-01-23 PROCEDURE — 1090F PRES/ABSN URINE INCON ASSESS: CPT | Performed by: NURSE PRACTITIONER

## 2019-01-23 PROCEDURE — 1101F PT FALLS ASSESS-DOCD LE1/YR: CPT | Performed by: NURSE PRACTITIONER

## 2019-01-23 PROCEDURE — G8427 DOCREV CUR MEDS BY ELIG CLIN: HCPCS | Performed by: NURSE PRACTITIONER

## 2019-01-23 PROCEDURE — 4040F PNEUMOC VAC/ADMIN/RCVD: CPT | Performed by: NURSE PRACTITIONER

## 2019-01-23 PROCEDURE — G8484 FLU IMMUNIZE NO ADMIN: HCPCS | Performed by: NURSE PRACTITIONER

## 2019-01-23 PROCEDURE — G8399 PT W/DXA RESULTS DOCUMENT: HCPCS | Performed by: NURSE PRACTITIONER

## 2019-01-23 PROCEDURE — 1036F TOBACCO NON-USER: CPT | Performed by: NURSE PRACTITIONER

## 2019-01-23 PROCEDURE — G8417 CALC BMI ABV UP PARAM F/U: HCPCS | Performed by: NURSE PRACTITIONER

## 2019-01-23 PROCEDURE — 1123F ACP DISCUSS/DSCN MKR DOCD: CPT | Performed by: NURSE PRACTITIONER

## 2019-01-23 RX ORDER — DEXTROMETHORPHAN HYDROBROMIDE AND PROMETHAZINE HYDROCHLORIDE 15; 6.25 MG/5ML; MG/5ML
SYRUP ORAL
Refills: 0 | COMMUNITY
Start: 2019-01-05 | End: 2019-03-20 | Stop reason: CLARIF

## 2019-01-23 RX ORDER — HYDROCODONE BITARTRATE AND ACETAMINOPHEN 5; 325 MG/1; MG/1
1 TABLET ORAL 2 TIMES DAILY PRN
Qty: 60 TABLET | Refills: 0 | Status: SHIPPED | OUTPATIENT
Start: 2019-01-23 | End: 2019-03-20 | Stop reason: SDUPTHER

## 2019-01-23 RX ORDER — HYDROCODONE BITARTRATE AND ACETAMINOPHEN 5; 325 MG/1; MG/1
1 TABLET ORAL 2 TIMES DAILY PRN
Qty: 60 TABLET | Refills: 0 | Status: SHIPPED | OUTPATIENT
Start: 2019-01-23 | End: 2019-01-23 | Stop reason: SDUPTHER

## 2019-02-13 ENCOUNTER — HOSPITAL ENCOUNTER (OUTPATIENT)
Age: 69
Discharge: HOME | End: 2019-02-13
Payer: MEDICARE

## 2019-02-13 LAB
ALBUMIN SERPL-MCNC: 3.8 G/DL (ref 3.5–5.2)
ALP BLD-CCNC: 60 U/L (ref 35–104)
ALT SERPL-CCNC: 15 U/L (ref 0–32)
ANION GAP SERPL CALCULATED.3IONS-SCNC: 10 MMOL/L (ref 7–16)
AST SERPL-CCNC: 33 U/L (ref 0–31)
BASOPHILS ABSOLUTE: 0.04 E9/L (ref 0–0.2)
BASOPHILS RELATIVE PERCENT: 0.9 % (ref 0–2)
BILIRUB SERPL-MCNC: 0.3 MG/DL (ref 0–1.2)
BUN BLDV-MCNC: 20 MG/DL (ref 8–23)
CALCIUM SERPL-MCNC: 9.2 MG/DL (ref 8.6–10.2)
CHLORIDE BLD-SCNC: 104 MMOL/L (ref 98–107)
CHOLESTEROL, FASTING: 154 MG/DL (ref 0–199)
CO2: 27 MMOL/L (ref 22–29)
CREAT SERPL-MCNC: 0.9 MG/DL (ref 0.5–1)
D DIMER: 220 NG/ML DDU
EOSINOPHILS ABSOLUTE: 0.1 E9/L (ref 0.05–0.5)
EOSINOPHILS RELATIVE PERCENT: 2.1 % (ref 0–6)
GFR AFRICAN AMERICAN: >60
GFR NON-AFRICAN AMERICAN: >60 ML/MIN/1.73
GLUCOSE FASTING: 129 MG/DL (ref 74–99)
HBA1C MFR BLD: 5.8 % (ref 4–5.6)
HCT VFR BLD CALC: 40.2 % (ref 34–48)
HDLC SERPL-MCNC: 58 MG/DL
HEMOGLOBIN: 12.5 G/DL (ref 11.5–15.5)
IMMATURE GRANULOCYTES #: 0.01 E9/L
IMMATURE GRANULOCYTES %: 0.2 % (ref 0–5)
LDL CHOLESTEROL CALCULATED: 78 MG/DL (ref 0–99)
LYMPHOCYTES ABSOLUTE: 1.39 E9/L (ref 1.5–4)
LYMPHOCYTES RELATIVE PERCENT: 29.7 % (ref 20–42)
MCH RBC QN AUTO: 27.4 PG (ref 26–35)
MCHC RBC AUTO-ENTMCNC: 31.1 % (ref 32–34.5)
MCV RBC AUTO: 88.2 FL (ref 80–99.9)
MONOCYTES ABSOLUTE: 0.42 E9/L (ref 0.1–0.95)
MONOCYTES RELATIVE PERCENT: 9 % (ref 2–12)
NEUTROPHILS ABSOLUTE: 2.72 E9/L (ref 1.8–7.3)
NEUTROPHILS RELATIVE PERCENT: 58.1 % (ref 43–80)
PDW BLD-RTO: 15.3 FL (ref 11.5–15)
PLATELET # BLD: 278 E9/L (ref 130–450)
PMV BLD AUTO: 11.8 FL (ref 7–12)
POTASSIUM SERPL-SCNC: 4.5 MMOL/L (ref 3.5–5)
RBC # BLD: 4.56 E12/L (ref 3.5–5.5)
SODIUM BLD-SCNC: 141 MMOL/L (ref 132–146)
T4 TOTAL: 6.6 MCG/DL (ref 4.5–11.7)
TOTAL PROTEIN: 6.8 G/DL (ref 6.4–8.3)
TRIGLYCERIDE, FASTING: 89 MG/DL (ref 0–149)
TSH SERPL DL<=0.05 MIU/L-ACNC: 2.06 UIU/ML (ref 0.27–4.2)
URIC ACID, SERUM: 7.2 MG/DL (ref 2.4–5.7)
VITAMIN B-12: 441 PG/ML (ref 211–946)
VLDLC SERPL CALC-MCNC: 18 MG/DL
WBC # BLD: 4.7 E9/L (ref 4.5–11.5)

## 2019-02-13 PROCEDURE — 82607 VITAMIN B-12: CPT

## 2019-02-13 PROCEDURE — 36415 COLL VENOUS BLD VENIPUNCTURE: CPT

## 2019-02-13 PROCEDURE — 84443 ASSAY THYROID STIM HORMONE: CPT

## 2019-02-13 PROCEDURE — 83036 HEMOGLOBIN GLYCOSYLATED A1C: CPT

## 2019-02-13 PROCEDURE — 80061 LIPID PANEL: CPT

## 2019-02-13 PROCEDURE — 85025 COMPLETE CBC W/AUTO DIFF WBC: CPT

## 2019-02-13 PROCEDURE — 84436 ASSAY OF TOTAL THYROXINE: CPT

## 2019-02-13 PROCEDURE — 85378 FIBRIN DEGRADE SEMIQUANT: CPT

## 2019-02-13 PROCEDURE — 86803 HEPATITIS C AB TEST: CPT

## 2019-02-13 PROCEDURE — 80053 COMPREHEN METABOLIC PANEL: CPT

## 2019-02-13 PROCEDURE — 84550 ASSAY OF BLOOD/URIC ACID: CPT

## 2019-02-14 LAB — HEPATITIS C ANTIBODY INTERPRETATION: NORMAL

## 2019-03-20 ENCOUNTER — OFFICE VISIT (OUTPATIENT)
Dept: PAIN MANAGEMENT | Age: 69
End: 2019-03-20
Payer: MEDICARE

## 2019-03-20 VITALS
SYSTOLIC BLOOD PRESSURE: 140 MMHG | BODY MASS INDEX: 37.56 KG/M2 | HEART RATE: 80 BPM | TEMPERATURE: 98.2 F | OXYGEN SATURATION: 99 % | WEIGHT: 220 LBS | HEIGHT: 64 IN | RESPIRATION RATE: 16 BRPM | DIASTOLIC BLOOD PRESSURE: 88 MMHG

## 2019-03-20 DIAGNOSIS — M54.41 CHRONIC BILATERAL LOW BACK PAIN WITH RIGHT-SIDED SCIATICA: ICD-10-CM

## 2019-03-20 DIAGNOSIS — M51.36 DDD (DEGENERATIVE DISC DISEASE), LUMBAR: Chronic | ICD-10-CM

## 2019-03-20 DIAGNOSIS — M54.16 LUMBAR RADICULOPATHY: Chronic | ICD-10-CM

## 2019-03-20 DIAGNOSIS — M48.061 NEURAL FORAMINAL STENOSIS OF LUMBAR SPINE: Chronic | ICD-10-CM

## 2019-03-20 DIAGNOSIS — G89.29 CHRONIC BILATERAL LOW BACK PAIN WITH RIGHT-SIDED SCIATICA: ICD-10-CM

## 2019-03-20 DIAGNOSIS — M48.07 SPINAL STENOSIS OF LUMBOSACRAL REGION: ICD-10-CM

## 2019-03-20 PROCEDURE — G8427 DOCREV CUR MEDS BY ELIG CLIN: HCPCS | Performed by: NURSE PRACTITIONER

## 2019-03-20 PROCEDURE — G8417 CALC BMI ABV UP PARAM F/U: HCPCS | Performed by: NURSE PRACTITIONER

## 2019-03-20 PROCEDURE — 99213 OFFICE O/P EST LOW 20 MIN: CPT | Performed by: NURSE PRACTITIONER

## 2019-03-20 PROCEDURE — 1123F ACP DISCUSS/DSCN MKR DOCD: CPT | Performed by: NURSE PRACTITIONER

## 2019-03-20 PROCEDURE — G8399 PT W/DXA RESULTS DOCUMENT: HCPCS | Performed by: NURSE PRACTITIONER

## 2019-03-20 PROCEDURE — 1090F PRES/ABSN URINE INCON ASSESS: CPT | Performed by: NURSE PRACTITIONER

## 2019-03-20 PROCEDURE — 4040F PNEUMOC VAC/ADMIN/RCVD: CPT | Performed by: NURSE PRACTITIONER

## 2019-03-20 PROCEDURE — 1101F PT FALLS ASSESS-DOCD LE1/YR: CPT | Performed by: NURSE PRACTITIONER

## 2019-03-20 PROCEDURE — 3017F COLORECTAL CA SCREEN DOC REV: CPT | Performed by: NURSE PRACTITIONER

## 2019-03-20 PROCEDURE — G8484 FLU IMMUNIZE NO ADMIN: HCPCS | Performed by: NURSE PRACTITIONER

## 2019-03-20 PROCEDURE — 1036F TOBACCO NON-USER: CPT | Performed by: NURSE PRACTITIONER

## 2019-03-20 RX ORDER — HYDROCODONE BITARTRATE AND ACETAMINOPHEN 5; 325 MG/1; MG/1
1 TABLET ORAL 2 TIMES DAILY PRN
Qty: 60 TABLET | Refills: 0 | Status: SHIPPED | OUTPATIENT
Start: 2019-03-20 | End: 2019-04-17 | Stop reason: SDUPTHER

## 2019-04-15 ENCOUNTER — HOSPITAL ENCOUNTER (OUTPATIENT)
Dept: ULTRASOUND IMAGING | Age: 69
Discharge: HOME | End: 2019-04-15
Payer: MEDICARE

## 2019-04-15 DIAGNOSIS — R42 DIZZINESS: ICD-10-CM

## 2019-04-15 PROCEDURE — 93880 EXTRACRANIAL BILAT STUDY: CPT

## 2019-04-16 ENCOUNTER — OFFICE VISIT (OUTPATIENT)
Dept: NEUROSURGERY | Age: 69
End: 2019-04-16
Payer: MEDICARE

## 2019-04-16 VITALS
WEIGHT: 230 LBS | BODY MASS INDEX: 38.32 KG/M2 | SYSTOLIC BLOOD PRESSURE: 139 MMHG | HEIGHT: 65 IN | HEART RATE: 78 BPM | DIASTOLIC BLOOD PRESSURE: 75 MMHG

## 2019-04-16 DIAGNOSIS — M54.41 CHRONIC MIDLINE LOW BACK PAIN WITH RIGHT-SIDED SCIATICA: Primary | ICD-10-CM

## 2019-04-16 DIAGNOSIS — G89.29 CHRONIC MIDLINE LOW BACK PAIN WITH RIGHT-SIDED SCIATICA: Primary | ICD-10-CM

## 2019-04-16 PROCEDURE — G8427 DOCREV CUR MEDS BY ELIG CLIN: HCPCS | Performed by: NEUROLOGICAL SURGERY

## 2019-04-16 PROCEDURE — G8399 PT W/DXA RESULTS DOCUMENT: HCPCS | Performed by: NEUROLOGICAL SURGERY

## 2019-04-16 PROCEDURE — 1123F ACP DISCUSS/DSCN MKR DOCD: CPT | Performed by: NEUROLOGICAL SURGERY

## 2019-04-16 PROCEDURE — G8417 CALC BMI ABV UP PARAM F/U: HCPCS | Performed by: NEUROLOGICAL SURGERY

## 2019-04-16 PROCEDURE — 1036F TOBACCO NON-USER: CPT | Performed by: NEUROLOGICAL SURGERY

## 2019-04-16 PROCEDURE — 4040F PNEUMOC VAC/ADMIN/RCVD: CPT | Performed by: NEUROLOGICAL SURGERY

## 2019-04-16 PROCEDURE — 1090F PRES/ABSN URINE INCON ASSESS: CPT | Performed by: NEUROLOGICAL SURGERY

## 2019-04-16 PROCEDURE — 3017F COLORECTAL CA SCREEN DOC REV: CPT | Performed by: NEUROLOGICAL SURGERY

## 2019-04-16 PROCEDURE — 99203 OFFICE O/P NEW LOW 30 MIN: CPT | Performed by: NEUROLOGICAL SURGERY

## 2019-04-16 ASSESSMENT — ENCOUNTER SYMPTOMS
RESPIRATORY NEGATIVE: 1
ABDOMINAL PAIN: 0
GASTROINTESTINAL NEGATIVE: 1
EYES NEGATIVE: 1
BOWEL INCONTINENCE: 0
ALLERGIC/IMMUNOLOGIC NEGATIVE: 1
BACK PAIN: 1

## 2019-04-16 NOTE — COMMUNICATION BODY
Assessment:     68year old lady who presents with back pain and neurogenic claudication. Her MRI shows lumbar scoliosis and stenosis from L2-L5. She has failed epidurals and physical therapy. Plan:     I am recommending an L2-L5 laminectomy and fusion. She will think about it.

## 2019-04-16 NOTE — PROGRESS NOTES
Subjective:      Patient ID: Mitch Meraz is a 68 y.o. female. Back Pain   This is a chronic problem. The current episode started more than 1 year ago. The problem occurs constantly. The problem has been gradually worsening since onset. The pain is present in the lumbar spine. The quality of the pain is described as aching. The pain radiates to the right thigh. The pain is at a severity of 4/10. The pain is moderate. The pain is worse during the night. The symptoms are aggravated by position. Stiffness is present in the morning. Associated symptoms include leg pain and numbness. Pertinent negatives include no abdominal pain, bladder incontinence, bowel incontinence, chest pain, dysuria, fever, headaches, paresis, paresthesias, pelvic pain, perianal numbness, tingling, weakness or weight loss. She has tried NSAIDs, analgesics and home exercises for the symptoms. The treatment provided mild relief. Review of Systems   Constitutional: Negative. Negative for fever and weight loss. HENT: Negative. Eyes: Negative. Respiratory: Negative. Cardiovascular: Negative. Negative for chest pain. Gastrointestinal: Negative. Negative for abdominal pain and bowel incontinence. Endocrine: Negative. Genitourinary: Negative. Negative for bladder incontinence, dysuria and pelvic pain. Musculoskeletal: Positive for back pain. Skin: Negative. Allergic/Immunologic: Negative. Neurological: Positive for numbness. Negative for tingling, weakness, headaches and paresthesias. Hematological: Negative. Psychiatric/Behavioral: Negative. Objective:   Physical Exam   Constitutional: She is oriented to person, place, and time. She appears well-developed and well-nourished. No distress. HENT:   Head: Normocephalic and atraumatic. Right Ear: External ear normal.   Left Ear: External ear normal.   Nose: Nose normal.   Mouth/Throat: Oropharynx is clear and moist. No oropharyngeal exudate.    Eyes: Pupils are equal, round, and reactive to light. Conjunctivae and EOM are normal. Right eye exhibits no discharge. Left eye exhibits no discharge. No scleral icterus. Neck: Normal range of motion. Neck supple. No JVD present. No tracheal deviation present. No thyromegaly present. Pulmonary/Chest: Effort normal. No respiratory distress. Abdominal: She exhibits no distension. Musculoskeletal: She exhibits no edema, tenderness or deformity. Lymphadenopathy:     She has no cervical adenopathy. Neurological: She is alert and oriented to person, place, and time. She has normal strength. She is not disoriented. She displays no atrophy, no tremor and normal reflexes. No cranial nerve deficit or sensory deficit. She exhibits normal muscle tone. She displays a negative Romberg sign. She displays no seizure activity. Coordination and gait normal. GCS eye subscore is 4. GCS verbal subscore is 5. GCS motor subscore is 6. She displays no Babinski's sign on the right side. She displays no Babinski's sign on the left side. Reflex Scores:       Tricep reflexes are 2+ on the right side and 2+ on the left side. Bicep reflexes are 2+ on the right side and 2+ on the left side. Brachioradialis reflexes are 2+ on the right side and 2+ on the left side. Patellar reflexes are 2+ on the right side and 2+ on the left side. Achilles reflexes are 2+ on the right side and 2+ on the left side. Skin: Capillary refill takes less than 2 seconds. No rash noted. She is not diaphoretic. No erythema. No pallor. Psychiatric: She has a normal mood and affect. Her behavior is normal. Judgment and thought content normal.   Vitals reviewed. Assessment:      68year old lady who presents with back pain and neurogenic claudication. Her MRI shows lumbar scoliosis and stenosis from L2-L5. She has failed epidurals and physical therapy. Plan:      I am recommending an L2-L5 laminectomy and fusion.   She will think about it.         Samy Patten MD

## 2019-04-16 NOTE — PATIENT INSTRUCTIONS

## 2019-04-17 ENCOUNTER — OFFICE VISIT (OUTPATIENT)
Dept: PAIN MANAGEMENT | Age: 69
End: 2019-04-17
Payer: MEDICARE

## 2019-04-17 VITALS
RESPIRATION RATE: 18 BRPM | OXYGEN SATURATION: 94 % | TEMPERATURE: 99.4 F | SYSTOLIC BLOOD PRESSURE: 130 MMHG | DIASTOLIC BLOOD PRESSURE: 68 MMHG | HEIGHT: 65 IN | BODY MASS INDEX: 38.32 KG/M2 | WEIGHT: 230 LBS | HEART RATE: 80 BPM

## 2019-04-17 DIAGNOSIS — M54.41 CHRONIC BILATERAL LOW BACK PAIN WITH RIGHT-SIDED SCIATICA: ICD-10-CM

## 2019-04-17 DIAGNOSIS — M51.36 DDD (DEGENERATIVE DISC DISEASE), LUMBAR: Chronic | ICD-10-CM

## 2019-04-17 DIAGNOSIS — M48.07 SPINAL STENOSIS OF LUMBOSACRAL REGION: ICD-10-CM

## 2019-04-17 DIAGNOSIS — G89.29 CHRONIC BILATERAL LOW BACK PAIN WITH RIGHT-SIDED SCIATICA: ICD-10-CM

## 2019-04-17 DIAGNOSIS — M54.16 LUMBAR RADICULOPATHY: Chronic | ICD-10-CM

## 2019-04-17 DIAGNOSIS — Z96.652 H/O TOTAL KNEE REPLACEMENT, LEFT: Primary | ICD-10-CM

## 2019-04-17 DIAGNOSIS — M48.061 NEURAL FORAMINAL STENOSIS OF LUMBAR SPINE: Chronic | ICD-10-CM

## 2019-04-17 PROCEDURE — 99213 OFFICE O/P EST LOW 20 MIN: CPT | Performed by: NURSE PRACTITIONER

## 2019-04-17 PROCEDURE — G8427 DOCREV CUR MEDS BY ELIG CLIN: HCPCS | Performed by: NURSE PRACTITIONER

## 2019-04-17 PROCEDURE — G8417 CALC BMI ABV UP PARAM F/U: HCPCS | Performed by: NURSE PRACTITIONER

## 2019-04-17 PROCEDURE — 4040F PNEUMOC VAC/ADMIN/RCVD: CPT | Performed by: NURSE PRACTITIONER

## 2019-04-17 PROCEDURE — 3017F COLORECTAL CA SCREEN DOC REV: CPT | Performed by: NURSE PRACTITIONER

## 2019-04-17 PROCEDURE — 1090F PRES/ABSN URINE INCON ASSESS: CPT | Performed by: NURSE PRACTITIONER

## 2019-04-17 PROCEDURE — 1123F ACP DISCUSS/DSCN MKR DOCD: CPT | Performed by: NURSE PRACTITIONER

## 2019-04-17 PROCEDURE — G8399 PT W/DXA RESULTS DOCUMENT: HCPCS | Performed by: NURSE PRACTITIONER

## 2019-04-17 PROCEDURE — 1036F TOBACCO NON-USER: CPT | Performed by: NURSE PRACTITIONER

## 2019-04-17 RX ORDER — FLUTICASONE PROPIONATE 50 MCG
2 SPRAY, SUSPENSION (ML) NASAL DAILY
Refills: 3 | COMMUNITY
Start: 2019-03-21

## 2019-04-17 RX ORDER — HYDROCODONE BITARTRATE AND ACETAMINOPHEN 5; 325 MG/1; MG/1
1 TABLET ORAL 2 TIMES DAILY PRN
Qty: 60 TABLET | Refills: 0 | Status: SHIPPED | OUTPATIENT
Start: 2019-04-17 | End: 2019-06-12 | Stop reason: SDUPTHER

## 2019-04-17 NOTE — PROGRESS NOTES
discussed. ;No signs of potential drug abuse or diversion identified: otherwise, see note documentation;Obtaining appropriate analgesic effect of treatment. Chronic Pain > 80 MEDD -       Past Medical History:   Diagnosis Date    Arthritis     Asthma     no inhaler, controlled ; hist years ago of asthmatic bronchitis, denies problems since    Depression     Diabetes mellitus ([facility] [address]     History of blood transfusion     Hyperlipidemia     Hypertension     Right cataract 2018       Past Surgical History:   Procedure Laterality Date    ABDOMEN SURGERY      CATARACT REMOVAL WITH IMPLANT Right 2018     SECTION      COLONOSCOPY      EYE SURGERY      bilateral cataracts    FRACTURE SURGERY      left foot    JOINT REPLACEMENT      total left knee    KNEE ARTHROPLASTY Left 2013    totoal joint arthroplasty left knee with platelet gel and nerve block    NERVE BLOCK  7 11 12    caudal #1    NERVE BLOCK  7 25 12    caudal #2    NERVE BLOCK  12    therapeutic caudal with epiduragram    NERVE BLOCK  2018    lumbar epidural injection #1    NERVE BLOCK Right 2018    lumbar medial branch block  #1    POLYPECTOMY      removed from uterus    MD INJ DX/THER AGNT PARAVERT FACET JOINT, LUMBAR/SAC, 1ST LEVEL Right 2018    RIGHT LUMBAR L2,3,4,MEDIAL BRANCH BLOCK #1 performed by Lida Garcia MD at [address] [place] [address] NOSE/CLEFT LIP/TIP N/A 2018    L3-4 LUMBAR EPIDURAL STEROID INJECTION #1 performed by Lida Garcia MD at [facility]         Prior to Admission medications    Medication Sig Start Date End Date Taking?  Authorizing Provider   fluticasone (FLONASE) 50 MCG/ACT nasal spray use 2 sprays in each nostril daily 3/21/19  Yes Historical Provider, MD   Loratadine-Pseudoephedrine (CLARITIN-D 12 HOUR PO) Take by mouth   Yes Historical Provider, MD   HYDROcodone-acetaminophen (NORCO) 5-325 MG per tablet Take 1 tablet by mouth 2 times daily as needed for Pain for up to 30 days. 3/20/19 4/19/19 Yes DEMETRA Samson - CNP   venlafaxine (EFFEXOR XR) 75 MG extended release capsule Take 1 capsule by mouth 2 times daily  7/29/18  Yes Historical Provider, MD   gabapentin (NEURONTIN) 300 MG capsule Take 300 mg by mouth 3 times daily Take am dos 01/12 .    Yes Historical Provider, MD   metFORMIN (GLUCOPHAGE-XR) 500 MG extended release tablet Take 500 mg by mouth 2 times daily   Yes Historical Provider, MD   pravastatin (PRAVACHOL) 20 MG tablet Take 20 mg by mouth nightly   Yes Historical Provider, MD   aspirin 81 MG tablet Take 81 mg by mouth daily Per PCP; contact Dr Vinayak Syed re:  preop instructions   Yes Historical Provider, MD   vitamin D (CHOLECALCIFEROL) 1000 UNIT TABS tablet Take 1,000 Units by mouth 2 times daily Ld 01/06   Yes Historical Provider, MD   lisinopril (PRINIVIL;ZESTRIL) 10 MG tablet Take 15 mg by mouth daily Instructed to take am of procedure, 01/12   Yes Historical Provider, MD   Multiple Vitamin (MULTIVITAMIN PO) Take 1 tablet by mouth daily LD 01/06   Yes Historical Provider, MD   fish oil-omega-3 fatty acids 1000 MG capsule Take 2 g by mouth 2 times daily LD 01/06   Yes Historical Provider, MD   Calcium Carbonate-Vit D-Min (CALCIUM 1200 PO) Take 600 mg by mouth 2 times daily LD 01/06   Yes Historical Provider, MD       Allergies   Allergen Reactions    Penicillins Rash       Social History     Socioeconomic History    Marital status:      Spouse name: Not on file    Number of children: Not on file    Years of education: Not on file    Highest education level: Not on file   Occupational History    Not on file   Social Needs    Financial resource strain: Not on file    Food insecurity:     Worry: Not on file     Inability: Not on file    Transportation needs:     Medical: Not on file     Non-medical: Not on file   Tobacco Use    Smoking status: Former Smoker     Packs/day: 2.00     Years: 25.00     Pack years: 50.00 Types: Cigarettes    Smokeless tobacco: Never Used    Tobacco comment: quit in 1981   Substance and Sexual Activity    Alcohol use: No     Comment: infrequent wine    Drug use: No    Sexual activity: Not on file   Lifestyle    Physical activity:     Days per week: Not on file     Minutes per session: Not on file    Stress: Not on file   Relationships    Social connections:     Talks on phone: Not on file     Gets together: Not on file     Attends [employer] service: Not on file     Active member of club or organization: Not on file     Attends meetings of clubs or organizations: Not on file     Relationship status: Not on file    Intimate partner violence:     Fear of current or ex partner: Not on file     Emotionally abused: Not on file     Physically abused: Not on file     Forced sexual activity: Not on file   Other Topics Concern    Not on file   Social History Narrative    Not on file       Family History   Problem Relation Age of Onset    Cancer Other     Diabetes Other     Heart Disease Other     Hypertension Other     Diabetes Mother     Mental Illness Mother     Stroke Father     High Blood Pressure Father        REVIEW OF SYSTEMS:     Bunny Garcia denies fever/chills, chest pain, shortness of breath, new bowel or bladder complaints. All other review of systems was negative. Denies any new neurologic complaints and/or red flag symptoms. PHYSICAL EXAMINATION:      /68   Pulse 80   Temp 99.4 °F (37.4 °C)   Resp 18   Ht 5' 5\" (1.651 m)   Wt 230 lb (104.3 kg)   SpO2 94%   BMI 38.27 kg/m²      General: Mild distress    HEENT: Head is normocephalic, atraumatic. Eyes: Extraocular muscles are intact. Cranial nerves II-XII grossly intact. Hearing is grossly intact. Oral mucosal membranes are moist.      GAIT/GROSS MOTOR STATIONS:  The patient ambulates slowly without the assistance of a None. Antalgic gait.     PERIPHERAL VASCULAR: No edema noted in the bilateral lower extremities. ABDOMEN:  Soft, nontender. PALPATION/SPINE INSPECTION:  Positive paraspinal muscle tenderness lumbar region. Spine inspection: scoliosis No CVA tenderness. Positive Right SIJ tenderness. Straight leg test: negative    RANGE OF MOTION: Lumbar Spine: Limited and Painful. Normal Reflexes/Motor on exam except 0 reflex of the right and left achilles. PROVOCATIVE MANEUVERS: Facet Loading:   Pain noted with facet loading of the lumbar spine to right side, left side     INTEGUMENT: Appearance of exposed skin is unremarkable. No rashes or lesions noted. Left Knee: diffuse Edema. Old incisional scar. Pain with ROM, limited. TTP diffusely    Assessment/Plan:    Patient seen for follow up for chronic low back pain secondary to spinal stenosis and increasing knee pain (h/o TKR in 2012)   XR Left knee to assess, will follow up with Dr. Chan Lozano  Patient is stable on her medication  Patient Saw Dr. Zhang Gardiner at [facility] and he did not recommend any surgery   Patient had appt with Dr. Kaycee Krishnamurthy, recommended L2-L5 laminectomy and fusion, patient is thinking about it.    Patient has failed conservative measures   Continue Norco 5/325 mg PO daily prn severe pain #60 to last 2 months  OARRS report reviewed  Patient encouraged to stay active as tolerated  Treatment plan discussed with the patient including medications and side effects     ccreferring physic    Edda Marquez, APRN - CNP

## 2019-05-20 ENCOUNTER — HOSPITAL ENCOUNTER (OUTPATIENT)
Age: 69
Discharge: HOME | End: 2019-05-22
Payer: MEDICARE

## 2019-05-20 ENCOUNTER — HOSPITAL ENCOUNTER (OUTPATIENT)
Dept: GENERAL RADIOLOGY | Age: 69
Discharge: HOME | End: 2019-05-22
Payer: MEDICARE

## 2019-05-20 DIAGNOSIS — Z96.652 H/O TOTAL KNEE REPLACEMENT, LEFT: ICD-10-CM

## 2019-05-20 PROCEDURE — 73562 X-RAY EXAM OF KNEE 3: CPT

## 2019-06-12 ENCOUNTER — OFFICE VISIT (OUTPATIENT)
Dept: PAIN MANAGEMENT | Age: 69
End: 2019-06-12
Payer: MEDICARE

## 2019-06-12 VITALS
BODY MASS INDEX: 36.65 KG/M2 | HEART RATE: 80 BPM | RESPIRATION RATE: 16 BRPM | DIASTOLIC BLOOD PRESSURE: 68 MMHG | SYSTOLIC BLOOD PRESSURE: 118 MMHG | OXYGEN SATURATION: 96 % | WEIGHT: 220 LBS | TEMPERATURE: 98.5 F | HEIGHT: 65 IN

## 2019-06-12 DIAGNOSIS — M54.41 CHRONIC BILATERAL LOW BACK PAIN WITH RIGHT-SIDED SCIATICA: ICD-10-CM

## 2019-06-12 DIAGNOSIS — M48.061 NEURAL FORAMINAL STENOSIS OF LUMBAR SPINE: Chronic | ICD-10-CM

## 2019-06-12 DIAGNOSIS — G89.29 CHRONIC BILATERAL LOW BACK PAIN WITH RIGHT-SIDED SCIATICA: ICD-10-CM

## 2019-06-12 DIAGNOSIS — M48.07 SPINAL STENOSIS OF LUMBOSACRAL REGION: ICD-10-CM

## 2019-06-12 DIAGNOSIS — Z96.652 H/O TOTAL KNEE REPLACEMENT, LEFT: Primary | ICD-10-CM

## 2019-06-12 DIAGNOSIS — M51.36 DDD (DEGENERATIVE DISC DISEASE), LUMBAR: Chronic | ICD-10-CM

## 2019-06-12 PROCEDURE — 99213 OFFICE O/P EST LOW 20 MIN: CPT | Performed by: NURSE PRACTITIONER

## 2019-06-12 PROCEDURE — 1090F PRES/ABSN URINE INCON ASSESS: CPT | Performed by: NURSE PRACTITIONER

## 2019-06-12 PROCEDURE — G8417 CALC BMI ABV UP PARAM F/U: HCPCS | Performed by: NURSE PRACTITIONER

## 2019-06-12 PROCEDURE — 4040F PNEUMOC VAC/ADMIN/RCVD: CPT | Performed by: NURSE PRACTITIONER

## 2019-06-12 PROCEDURE — G8427 DOCREV CUR MEDS BY ELIG CLIN: HCPCS | Performed by: NURSE PRACTITIONER

## 2019-06-12 PROCEDURE — 3017F COLORECTAL CA SCREEN DOC REV: CPT | Performed by: NURSE PRACTITIONER

## 2019-06-12 PROCEDURE — 1036F TOBACCO NON-USER: CPT | Performed by: NURSE PRACTITIONER

## 2019-06-12 PROCEDURE — 1123F ACP DISCUSS/DSCN MKR DOCD: CPT | Performed by: NURSE PRACTITIONER

## 2019-06-12 PROCEDURE — G8399 PT W/DXA RESULTS DOCUMENT: HCPCS | Performed by: NURSE PRACTITIONER

## 2019-06-12 RX ORDER — HYDROCODONE BITARTRATE AND ACETAMINOPHEN 5; 325 MG/1; MG/1
1 TABLET ORAL 2 TIMES DAILY PRN
Qty: 60 TABLET | Refills: 0 | Status: SHIPPED | OUTPATIENT
Start: 2019-06-12 | End: 2019-08-12 | Stop reason: SDUPTHER

## 2019-06-12 RX ORDER — LANCETS
EACH MISCELLANEOUS
COMMUNITY
Start: 2019-06-06

## 2019-06-12 RX ORDER — MONTELUKAST SODIUM 10 MG/1
1 TABLET ORAL DAILY
Refills: 1 | COMMUNITY
Start: 2019-06-03

## 2019-06-12 RX ORDER — LANCING DEVICE/LANCETS
KIT MISCELLANEOUS
COMMUNITY
Start: 2019-04-23

## 2019-06-12 NOTE — PROGRESS NOTES
[facility]  [address] [facility], [address] [place] [address] Sergio  [phone]    Follow up Note      Marah Charter     Date of Visit:  6/12/2019    CC:     Chief Complaint   Patient presents with    Other     LOWER BACK DOWN RIGHT LEG       HPI:    Pain is unchanged   Change in quality of symptoms:no. Medication side effects:none. Recent diagnostic testing:Knee Left XR  Recent interventional procedures: none    She has been on anticoagulation medications to include ASA. The patient  has not been on herbal supplements. The patient is diabetic. PSYCH./MENTAL STATUS:  The patient is awake and alert. Displays appropriate mood and affect. Denies suicidal ideation or intent. Imaging studies:   5/2019 XR Left Knee: Total knee arthroplasty is identified without patellar resurfacing. There is normal alignment.  No loosening noted.  Mild soft tissue   swelling and joint effusion is noted. Results for orders placed in visit on 07/13/18   MRI Lumbar Spine WO Contrast    Narrative Reading location: [facility]    Indication: Lumbar radiculopathy. Comparison: Lumbar radiograph from 6/7/2017; CT lumbar spine from  12/29/2014. Technique: Multiplanar, multisequence MR imaging of the lumbar spine  was performed without administration of intravenous contrast.    FINDINGS:    For purposes of this study, the last fully formed disc is considered  L5-S1 and corresponds to axial image 28 of series 6. There is normal lumbar lordosis with levoconvex curvature of the  thoracolumbar spine. Vertebral bodies maintain normal height. Alignment is maintained. There degenerative endplate changes at E70-Q3  and L2-L3. There is no evidence of acute fracture. Conus medullaris terminates at L1 and is normal in morphology and  signal.    There is multilevel intervertebral disc desiccation. T12-L1: Mild diffuse disc bulge. Bilateral facet hypertrophy. No  central canal or left foraminal narrowing. Mild right foraminal  narrowing. L1-L2: Minimal disc bulge. No central canal or foraminal narrowing. L2-L3: Mild diffuse disc bulge asymmetric to the left. Bilateral facet  hypertrophy and ligamentum flavum infolding. Mild central canal and  mild to moderate right foraminal narrowing. No left foraminal  narrowing. L3-L4: Mild diffuse disc bulge. Bilateral facet hypertrophy and  ligamentum flavum infolding. Mild central canal narrowing. No  foraminal narrowing. L4-L5: Diffuse disc bulge. Bilateral facet hypertrophy and ligamentum  flavum infolding. Severe central canal narrowing. Mild bilateral  foraminal narrowing. L5-S1: Minimal disc bulge with superimposed central annular fissure. Mild left facet hypertrophy. No central canal or right foraminal  narrowing. Mild left foraminal narrowing. Visualized paravertebral soft tissues are grossly unremarkable. Impression 1. Levoconvex curvature with multilevel lumbar spondylosis, as  described above, most pronounced at L4-L5, where there is a diffuse  disc bulge with facet hypertrophy resulting in severe central canal  narrowing. 2. Mild central canal narrowing at L2-L3 and L3-L4. 3. Mild to moderate right foraminal narrowing at L2-L3. 4. Mild bilateral foraminal narrowing at L4-L5. Mild right foraminal  narrowing at T12-L1 and L5-S1.      Lumbar spine CT 2014  IMPRESSION: Multilevel disc bulging and facet joint arthropathy   with spondylosis resulting in varying degrees of foraminal and   spinal canal stenosis      Cervical spine XR 06/2015  Multilevel spondylosis       Potential Aberrant Drug-Related Behavior:  none    Urine Drug Screening:  Buccal 1/19 negative for all as expected    OARRS report  04/2018-12/2018 Consistent  1/2019 Consistent   3/2019 Consistent   4/2019 Consistent   6/2019 Consistent    Controlled Substances Monitoring:     RX Monitoring 6/12/2019   Attestation -   Periodic Controlled Substance Monitoring Possible medication side effects, risk of tolerance/dependence & alternative treatments discussed. ;No signs of potential drug abuse or diversion identified.;Obtaining appropriate analgesic effect of treatment. ;Assessed functional status. Chronic Pain > 80 MEDD -       Past Medical History:   Diagnosis Date    Arthritis     Asthma     no inhaler, controlled ; hist years ago of asthmatic bronchitis, denies problems since    Depression     Diabetes mellitus ([facility] [address]     History of blood transfusion     Hyperlipidemia     Hypertension     Right cataract 2018       Past Surgical History:   Procedure Laterality Date    ABDOMEN SURGERY      CATARACT REMOVAL WITH IMPLANT Right 2018     SECTION      COLONOSCOPY      EYE SURGERY      bilateral cataracts    FRACTURE SURGERY      left foot    JOINT REPLACEMENT      total left knee    KNEE ARTHROPLASTY Left 2013    totoal joint arthroplasty left knee with platelet gel and nerve block    NERVE BLOCK  7 11 12    caudal #1    NERVE BLOCK  7  12    caudal #2    NERVE BLOCK  12    therapeutic caudal with epiduragram    NERVE BLOCK  2018    lumbar epidural injection #1    NERVE BLOCK Right 2018    lumbar medial branch block  #1    POLYPECTOMY      removed from uterus    DC INJ DX/THER AGNT PARAVERT FACET JOINT, LUMBAR/SAC, 1ST LEVEL Right 2018    RIGHT LUMBAR L2,3,4,MEDIAL BRANCH BLOCK #1 performed by Charly Messer MD at [address] [place] [address] NOSE/CLEFT LIP/TIP N/A 2018    L3-4 LUMBAR EPIDURAL STEROID INJECTION #1 performed by Charly [facility]er MD at [facility]         Prior to Admission medications    Medication Sig Start Date End Date Taking?  Authorizing Provider   fluticasone (FLONASE) 50 MCG/ACT nasal spray use 2 sprays in each nostril daily 3/21/19  Yes Historical Provider, MD   Loratadine-Pseudoephedrine (CLARITIN-D 12 HOUR PO) Take by mouth   Yes Historical Provider, MD   venlafaxine (EFFEXOR XR) 75 MG extended release capsule Take 1 capsule by mouth 2 times daily  7/29/18  Yes Historical Provider, MD   gabapentin (NEURONTIN) 300 MG capsule Take 300 mg by mouth 3 times daily Take am dos 01/12 .    Yes Historical Provider, MD   metFORMIN (GLUCOPHAGE-XR) 500 MG extended release tablet Take 500 mg by mouth 2 times daily   Yes Historical Provider, MD   pravastatin (PRAVACHOL) 20 MG tablet Take 20 mg by mouth nightly   Yes Historical Provider, MD   aspirin 81 MG tablet Take 81 mg by mouth daily Per PCP; contact Dr Natalee Peralta re:  preop instructions   Yes Historical Provider, MD   vitamin D (CHOLECALCIFEROL) 1000 UNIT TABS tablet Take 1,000 Units by mouth 2 times daily Ld 01/06   Yes Historical Provider, MD   lisinopril (PRINIVIL;ZESTRIL) 10 MG tablet Take 15 mg by mouth daily Instructed to take am of procedure, 01/12   Yes Historical Provider, MD   Multiple Vitamin (MULTIVITAMIN PO) Take 1 tablet by mouth daily LD 01/06   Yes Historical Provider, MD   fish oil-omega-3 fatty acids 1000 MG capsule Take 2 g by mouth 2 times daily LD 01/06   Yes Historical Provider, MD   Calcium Carbonate-Vit D-Min (CALCIUM 1200 PO) Take 600 mg by mouth 2 times daily LD 01/06   Yes Historical Provider, MD   ACCU-CHEK SOFTCLIX LANCETS [address] [facility]  6/6/19   Historical Provider, MD   Lancets Misc. (ACCU-CHEK SOFTCLIX LANCET DEV) KIT  4/23/19   Historical Provider, MD   montelukast (SINGULAIR) 10 MG tablet TAKE ONE TABLET BY MOUTH EVERY DAY 6/3/19   Historical Provider, MD       Allergies   Allergen Reactions    Penicillins Rash       Social History     Socioeconomic History    Marital status:      Spouse name: Not on file    Number of children: Not on file    Years of education: Not on file    Highest education level: Not on file   Occupational History    Not on file   Social Needs    Financial resource strain: Not on file    Food insecurity:     Worry: Not on file     Inability: Not on file    Transportation needs:     Medical: Not on file     Non-medical: Not on file   Tobacco Use    Smoking status: Former Smoker     Packs/day: 2.00     Years: 25.00     Pack years: 50.00     Types: Cigarettes    Smokeless tobacco: Never Used    Tobacco comment: quit in 1981   Substance and Sexual Activity    Alcohol use: No     Comment: infrequent wine    Drug use: No    Sexual activity: Not on file   Lifestyle    Physical activity:     Days per week: Not on file     Minutes per session: Not on file    Stress: Not on file   Relationships    Social connections:     Talks on phone: Not on file     Gets together: Not on file     Attends Mosque service: Not on file     Active member of club or organization: Not on file     Attends meetings of clubs or organizations: Not on file     Relationship status: Not on file    Intimate partner violence:     Fear of current or ex partner: Not on file     Emotionally abused: Not on file     Physically abused: Not on file     Forced sexual activity: Not on file   Other Topics Concern    Not on file   Social History Narrative    Not on file       Family History   Problem Relation Age of Onset    Cancer Other     Diabetes Other     Heart Disease Other     Hypertension Other     Diabetes Mother     Mental Illness Mother     Stroke Father     High Blood Pressure Father        REVIEW OF SYSTEMS:     Leighton Avalos denies fever/chills, chest pain, shortness of breath, new bowel or bladder complaints. All other review of systems was negative. Denies any new neurologic complaints and/or red flag symptoms. PHYSICAL EXAMINATION:      /68 (Site: Right Upper Arm, Position: Sitting, Cuff Size: Medium Adult)   Pulse 80   Temp 98.5 °F (36.9 °C) (Oral)   Resp 16   Ht 5' 5\" (1.651 m)   Wt 220 lb (99.8 kg)   SpO2 96%   BMI 36.61 kg/m²     General:      General appearance: Pleasant and well-hydrated.  In moderate discomfort and alert and oriented x3  Build:Overweight  Function:Rises from a seated position with difficulty     HEENT:     Head:normocephalic and atraumatic  Pupils:regular, round and equal.  Sclera: icterus absent,     Abdomen:     Shape: Obese and non-distended  Tenderness:none  Guarding:none     Cervical spine:     Inspection: Normal  Palpation: Tenderness paravertebral muscles, facet loading, left, right, positive and tenderness. Range of motion: abnormal moderately flexion, extension rotation bilateral and is painful. Lumbar spine:     Spine inspection: Scoliosis  CVA tenderness: None  Palpation: Tenderness paravertebral muscles, facet loading, left, right, positive and tenderness. Range of motion: Abnormal moderately Lateral bending, flexion, extension rotation bilateral and is painful. Musculoskeletal:     Trigger points in trapezius: absent bilaterally  Trigger points in rhomboids: absent bilaterally  Trigger points in Paravertebral: absent bilaterally  Trigger points in supraspinatus/infraspinatus: absent  Spurling's: Negative  Wahl's: Negative   SI joint tenderness: Positive Right  Jonathan test: Not tested  Piriformis tenderness: Negative  Trochanteric bursa tenderness: Negative  SLR: Negative, sitting     Extremities:     Tremors: None bilaterally upper and lower  Range of motion: Generally normal shoulders, pain with internal rotation of hips Negative  Intact: Yes  Edema: Normal    Knee:     Inspection:asymmetric, swelling mild right, Right incisional scar  Tenderness of Bony Landmarks:Diffuse, right  Tenderness of Bursa:Anterior, right  Drawer Test:negative  Effusion:absent right  Crepitus:absent right  ROM:Left limited by pain Right      Neurological:     Sensory: Normal to light touch bilateral upper and lower extremities  Motor:  Right Grip5/5  Left Grip5/5  Right Bicep5/5  Left Bicep5/5  Right Triceps5/5  Left Triceps5/5  Right Deltoid5/5  Left Deltoid5/5  Right Quadriceps5/5  Left Quadriceps5/5  Right Gastrocnemius5/5  Left Gastrocnemius5/5  Right Ant Tibialis5/5  Left Ant Tibialis5/5  Reflexes:  Right Brachioradialis reflex2+  Left Brachioradialis reflex2+  Right Biceps reflex2+  Left Biceps reflex2+  Right Triceps reflex2+  Left Triceps reflex2+  Right Quadriceps reflex2+  Left Quadriceps reflexNot done   Right Achilles reflex0  Left Achilles reflex0  Gait: Antalgic      Dermatology:     Skin: No unusual rashes and no skin lesions    Assessment/Plan:    Patient seen for follow up for chronic low back pain secondary to spinal stenosis and knee pain   Patient has h/o TKR in 2012)   XR Left knee reviewed, no acute findings stable postoperative changes.  Mild swelling and effusion noted  Recommended RICE therapy  Rx Compound for her knee   Patient will call Dr. Willard Moy and follow up  Patient [facility]. Sandra Belle at [facility] and he did not recommend any surgery for her back  Patient saw Dr. Lisa Dickinson, recommended L2-L5 laminectomy and fusion, patient is apprehensive  Patient has failed conservative measures    Continue Norco 5/325 mg PO daily prn severe pain #60 to last 2 months  OARRS report reviewed  Patient encouraged to stay active as tolerated  Treatment plan discussed with the patient including medications and side effects   Buccal or UDS today     ccreferring fernanda Wade, APRN - CNP

## 2019-06-12 NOTE — PROGRESS NOTES
Marilyn Sotelo presents to the [facility] on 6/12/2019. Milind Gates is complaining of pain back down right leg. The pain is constant. The pain is described as aching, stabbing, dull and burning. Pain is rated on her best day at a 4, on her worst day at a 9, and on average at a 6 on the VAS scale. She took her last dose of Norco last night. Any procedures since your last visit: No, with  % relief. She has been on anticoagulation medications to include ASA and is managed by      Pacemaker or defibrilator: No Physician managing device is .        /68 (Site: Right Upper Arm, Position: Sitting, Cuff Size: Medium Adult)   Pulse 80   Temp 98.5 °F (36.9 °C) (Oral)   Resp 16   Ht 5' 5\" (1.651 m)   Wt 220 lb (99.8 kg)   SpO2 96%   BMI 36.61 kg/m²

## 2019-07-02 ENCOUNTER — OFFICE VISIT (OUTPATIENT)
Dept: ORTHOPEDIC SURGERY | Age: 69
End: 2019-07-02
Payer: MEDICARE

## 2019-07-02 VITALS — BODY MASS INDEX: 36.65 KG/M2 | WEIGHT: 220 LBS | HEIGHT: 65 IN

## 2019-07-02 DIAGNOSIS — Z96.652 S/P TOTAL KNEE ARTHROPLASTY, LEFT: Primary | ICD-10-CM

## 2019-07-02 PROCEDURE — 4040F PNEUMOC VAC/ADMIN/RCVD: CPT | Performed by: ORTHOPAEDIC SURGERY

## 2019-07-02 PROCEDURE — 3017F COLORECTAL CA SCREEN DOC REV: CPT | Performed by: ORTHOPAEDIC SURGERY

## 2019-07-02 PROCEDURE — G8417 CALC BMI ABV UP PARAM F/U: HCPCS | Performed by: ORTHOPAEDIC SURGERY

## 2019-07-02 PROCEDURE — 1036F TOBACCO NON-USER: CPT | Performed by: ORTHOPAEDIC SURGERY

## 2019-07-02 PROCEDURE — 1090F PRES/ABSN URINE INCON ASSESS: CPT | Performed by: ORTHOPAEDIC SURGERY

## 2019-07-02 PROCEDURE — G8399 PT W/DXA RESULTS DOCUMENT: HCPCS | Performed by: ORTHOPAEDIC SURGERY

## 2019-07-02 PROCEDURE — G8428 CUR MEDS NOT DOCUMENT: HCPCS | Performed by: ORTHOPAEDIC SURGERY

## 2019-07-02 PROCEDURE — 1123F ACP DISCUSS/DSCN MKR DOCD: CPT | Performed by: ORTHOPAEDIC SURGERY

## 2019-07-02 PROCEDURE — 99213 OFFICE O/P EST LOW 20 MIN: CPT | Performed by: ORTHOPAEDIC SURGERY

## 2019-07-02 NOTE — PROGRESS NOTES
Chief Complaint   Patient presents with    Knee Pain     Left knee replacement in . Pain and swelling. Kumar Moreno returns today for follow-up of her left knee pain. she reports this is worse than when I saw her last.  The patient's pain level is a 7/10. The previous treatment was successful. She had a left TKA in . She notes stepping down wrong into a pool in [place]  starting her pain. There were 2 other injuries over the past few yrs as well.     Past Medical History:   Diagnosis Date    Arthritis     Asthma     no inhaler, controlled ; hist years ago of asthmatic bronchitis, denies problems since    Depression     Diabetes mellitus ([facility] [address]     History of blood transfusion     Hyperlipidemia     Hypertension     Right cataract 2018     Past Surgical History:   Procedure Laterality Date    ABDOMEN SURGERY      CATARACT REMOVAL WITH IMPLANT Right 2018     SECTION      COLONOSCOPY      EYE SURGERY      bilateral cataracts    FRACTURE SURGERY      left foot    JOINT REPLACEMENT      total left knee    KNEE ARTHROPLASTY Left 2013    totoal joint arthroplasty left knee with platelet gel and nerve block    NERVE BLOCK  7 11 12    caudal #1    NERVE BLOCK  7 25 12    caudal #2    NERVE BLOCK  12    therapeutic caudal with epiduragram    NERVE BLOCK  2018    lumbar epidural injection #1    NERVE BLOCK Right 2018    lumbar medial branch block  #1    POLYPECTOMY      removed from uterus    LA INJ DX/THER AGNT PARAVERT FACET JOINT, LUMBAR/SAC, 1ST LEVEL Right 2018    RIGHT LUMBAR L2,3,4,MEDIAL BRANCH BLOCK #1 performed by Jonel Palomino MD at [address] [place] [address] NOSE/CLEFT LIP/TIP N/A 2018    L3-4 LUMBAR EPIDURAL STEROID INJECTION #1 performed by [facility]el [facility]ino MD at [facility]         Current Outpatient Medications:     ACCU-CHEK SOFTCLIX LANCETS MISC, , Disp: , Rfl:     Lancets Misc. (ACCU-CHEK  Food insecurity:     Worry: Not on file     Inability: Not on file    Transportation needs:     Medical: Not on file     Non-medical: Not on file   Tobacco Use    Smoking status: Former Smoker     Packs/day: 2.00     Years: 25.00     Pack years: 50.00     Types: Cigarettes    Smokeless tobacco: Never Used    Tobacco comment: quit in 1981   Substance and Sexual Activity    Alcohol use: No     Comment: infrequent wine    Drug use: No    Sexual activity: Not on file   Lifestyle    Physical activity:     Days per week: Not on file     Minutes per session: Not on file    Stress: Not on file   Relationships    Social connections:     Talks on phone: Not on file     Gets together: Not on file     Attends Nondenominational service: Not on file     Active member of club or organization: Not on file     Attends meetings of clubs or organizations: Not on file     Relationship status: Not on file    Intimate partner violence:     Fear of current or ex partner: Not on file     Emotionally abused: Not on file     Physically abused: Not on file     Forced sexual activity: Not on file   Other Topics Concern    Not on file   Social History Narrative    Not on file     Family History   Problem Relation Age of Onset    Cancer Other     Diabetes Other     Heart Disease Other     Hypertension Other     Diabetes Mother     Mental Illness Mother     Stroke Father     High Blood Pressure Father        Review of Systems:     Skin: (-) rash,(-) psoriasis,(-) eczema, (-)skin cancer. Musculoskeletal: (-) fractures,  (-) dislocations,(-) collagen vascular disease, (-) fibromyalgia, (-) multiple sclerosis, (-) muscular dystrophy, (-) RSD,(-) joint pain (-)swelling, (-) joint pain,swelling. Neurologic: (-) epilepsy, (-)seizures,(-) brain tumor,(-) TIA, (-)stroke, (-)headaches, (-)Parkinson disease,(-) memory loss, (-) LOC.   Cardiovascular: (-) Chest pain, (-) swelling in legs/feet, (-) SOB, (-) cramping in legs/feet with not ligamentous instability, there is not  deformity noted. Knee exam: bilateral positive for moderate crepitations, some mild tenderness laxity is not noted with  stress. R. Knee:  Lachman's negative, Anterior Drawer negative, Posterior Drawer negative  Meggan's negative, Thallasy  negative,   PF grind test negative, Apprehension test negative, Patellar J sign  negative  L. Knee:  Lachman's negative, Anterior Drawer negative, Posterior Drawer negative  Meggan's positive, Thallasy  positive,   PF grind test positive, Apprehension test negative,  Patellar J sign  negative    Xrays:   well aligned TKA with no signs of aspectic loosening    MRI:   Not performed  Radiographic findings reviewed with patient    Impression:  Encounter Diagnosis   Name Primary?  S/P total knee arthroplasty, left Yes       Plan:   Natural history and expected course discussed. Questions answered. Educational materials distributed. Rest, ice, compression, and elevation (RICE) therapy. I will order the Katelyn Pain Cream and she will continue with activities as tolerated. Her knee replacement prosthesis is fitting well. Her swelling may be vascular related. She will speak to her PCP.

## 2019-07-12 DIAGNOSIS — M25.512 ACUTE PAIN OF LEFT SHOULDER: Primary | ICD-10-CM

## 2019-07-12 DIAGNOSIS — M25.522 LEFT ELBOW PAIN: ICD-10-CM

## 2019-07-24 ENCOUNTER — HOSPITAL ENCOUNTER (OUTPATIENT)
Dept: CT IMAGING | Age: 69
Discharge: HOME | End: 2019-07-24
Payer: MEDICARE

## 2019-07-24 DIAGNOSIS — J32.8 OTHER CHRONIC SINUSITIS: ICD-10-CM

## 2019-07-24 PROCEDURE — 70486 CT MAXILLOFACIAL W/O DYE: CPT

## 2019-08-02 ENCOUNTER — HOSPITAL ENCOUNTER (OUTPATIENT)
Dept: INTERVENTIONAL RADIOLOGY/VASCULAR | Age: 69
Discharge: HOME | End: 2019-08-04
Payer: MEDICARE

## 2019-08-02 DIAGNOSIS — R60.9 EDEMA, UNSPECIFIED TYPE: ICD-10-CM

## 2019-08-02 DIAGNOSIS — R60.9 SWELLING: ICD-10-CM

## 2019-08-02 PROCEDURE — 93970 EXTREMITY STUDY: CPT

## 2019-08-12 ENCOUNTER — OFFICE VISIT (OUTPATIENT)
Dept: PAIN MANAGEMENT | Age: 69
End: 2019-08-12
Payer: MEDICARE

## 2019-08-12 VITALS
SYSTOLIC BLOOD PRESSURE: 118 MMHG | DIASTOLIC BLOOD PRESSURE: 70 MMHG | OXYGEN SATURATION: 96 % | HEART RATE: 77 BPM | WEIGHT: 230 LBS | TEMPERATURE: 99.3 F | RESPIRATION RATE: 16 BRPM | BODY MASS INDEX: 38.32 KG/M2 | HEIGHT: 65 IN

## 2019-08-12 DIAGNOSIS — M47.812 CERVICAL FACET JOINT SYNDROME: ICD-10-CM

## 2019-08-12 DIAGNOSIS — M51.36 DDD (DEGENERATIVE DISC DISEASE), LUMBAR: Chronic | ICD-10-CM

## 2019-08-12 DIAGNOSIS — M50.30 DEGENERATIVE CERVICAL DISC: ICD-10-CM

## 2019-08-12 DIAGNOSIS — M48.07 SPINAL STENOSIS OF LUMBOSACRAL REGION: ICD-10-CM

## 2019-08-12 DIAGNOSIS — G89.29 CHRONIC BILATERAL LOW BACK PAIN WITH RIGHT-SIDED SCIATICA: ICD-10-CM

## 2019-08-12 DIAGNOSIS — M54.2 NECK PAIN: Primary | ICD-10-CM

## 2019-08-12 DIAGNOSIS — M54.16 LUMBAR RADICULOPATHY: Chronic | ICD-10-CM

## 2019-08-12 DIAGNOSIS — M47.816 LUMBAR FACET ARTHROPATHY: ICD-10-CM

## 2019-08-12 DIAGNOSIS — M51.36 LUMBAR DEGENERATIVE DISC DISEASE: ICD-10-CM

## 2019-08-12 DIAGNOSIS — M48.061 NEURAL FORAMINAL STENOSIS OF LUMBAR SPINE: Chronic | ICD-10-CM

## 2019-08-12 DIAGNOSIS — M48.062 SPINAL STENOSIS OF LUMBAR REGION WITH NEUROGENIC CLAUDICATION: ICD-10-CM

## 2019-08-12 DIAGNOSIS — M54.41 CHRONIC BILATERAL LOW BACK PAIN WITH RIGHT-SIDED SCIATICA: ICD-10-CM

## 2019-08-12 PROCEDURE — 99213 OFFICE O/P EST LOW 20 MIN: CPT | Performed by: NURSE PRACTITIONER

## 2019-08-12 PROCEDURE — 1036F TOBACCO NON-USER: CPT | Performed by: NURSE PRACTITIONER

## 2019-08-12 PROCEDURE — 3017F COLORECTAL CA SCREEN DOC REV: CPT | Performed by: NURSE PRACTITIONER

## 2019-08-12 PROCEDURE — G8427 DOCREV CUR MEDS BY ELIG CLIN: HCPCS | Performed by: NURSE PRACTITIONER

## 2019-08-12 PROCEDURE — 4040F PNEUMOC VAC/ADMIN/RCVD: CPT | Performed by: NURSE PRACTITIONER

## 2019-08-12 PROCEDURE — 1090F PRES/ABSN URINE INCON ASSESS: CPT | Performed by: NURSE PRACTITIONER

## 2019-08-12 PROCEDURE — G8399 PT W/DXA RESULTS DOCUMENT: HCPCS | Performed by: NURSE PRACTITIONER

## 2019-08-12 PROCEDURE — 1123F ACP DISCUSS/DSCN MKR DOCD: CPT | Performed by: NURSE PRACTITIONER

## 2019-08-12 PROCEDURE — G8417 CALC BMI ABV UP PARAM F/U: HCPCS | Performed by: NURSE PRACTITIONER

## 2019-08-12 RX ORDER — OXYBUTYNIN CHLORIDE 5 MG/1
TABLET ORAL
COMMUNITY
Start: 2019-08-02 | End: 2020-02-03

## 2019-08-12 RX ORDER — HYDROCODONE BITARTRATE AND ACETAMINOPHEN 5; 325 MG/1; MG/1
1 TABLET ORAL 2 TIMES DAILY PRN
Qty: 60 TABLET | Refills: 0 | Status: SHIPPED | OUTPATIENT
Start: 2019-08-12 | End: 2019-09-11

## 2019-08-12 NOTE — PROGRESS NOTES
foraminal  narrowing. L1-L2: Minimal disc bulge. No central canal or foraminal narrowing. L2-L3: Mild diffuse disc bulge asymmetric to the left. Bilateral facet  hypertrophy and ligamentum flavum infolding. Mild central canal and  mild to moderate right foraminal narrowing. No left foraminal  narrowing. L3-L4: Mild diffuse disc bulge. Bilateral facet hypertrophy and  ligamentum flavum infolding. Mild central canal narrowing. No  foraminal narrowing. L4-L5: Diffuse disc bulge. Bilateral facet hypertrophy and ligamentum  flavum infolding. Severe central canal narrowing. Mild bilateral  foraminal narrowing. L5-S1: Minimal disc bulge with superimposed central annular fissure. Mild left facet hypertrophy. No central canal or right foraminal  narrowing. Mild left foraminal narrowing. Visualized paravertebral soft tissues are grossly unremarkable. Impression 1. Levoconvex curvature with multilevel lumbar spondylosis, as  described above, most pronounced at L4-L5, where there is a diffuse  disc bulge with facet hypertrophy resulting in severe central canal  narrowing. 2. Mild central canal narrowing at L2-L3 and L3-L4. 3. Mild to moderate right foraminal narrowing at L2-L3. 4. Mild bilateral foraminal narrowing at L4-L5. Mild right foraminal  narrowing at T12-L1 and L5-S1.      Lumbar spine CT 2014  IMPRESSION: Multilevel disc bulging and facet joint arthropathy   with spondylosis resulting in varying degrees of foraminal and   spinal canal stenosis      Cervical spine XR 06/2015  Multilevel spondylosis       Potential Aberrant Drug-Related Behavior:  none    Urine Drug Screening:  Buccal 1/19 negative for all as expected  7/2019 Buccal consistent     OARRS report  04/2018-12/2018 Consistent  1/2019 Consistent to 8/2019 Consistent     Controlled Substances Monitoring:     RX Monitoring 8/12/2019   Attestation -   Periodic Controlled Substance Monitoring Possible medication side effects, risk of tolerance/dependence & alternative treatments discussed. ;No signs of potential drug abuse or diversion identified. ;Assessed functional status. ;Obtaining appropriate analgesic effect of treatment. Chronic Pain > 80 MEDD -       Past Medical History:   Diagnosis Date    Arthritis     Asthma     no inhaler, controlled ; hist years ago of asthmatic bronchitis, denies problems since    Depression     Diabetes mellitus ([facility] [address]     History of blood transfusion     Hyperlipidemia     Hypertension     Right cataract 2018       Past Surgical History:   Procedure Laterality Date    ABDOMEN SURGERY      CATARACT REMOVAL WITH IMPLANT Right 2018     SECTION      COLONOSCOPY      EYE SURGERY      bilateral cataracts    FRACTURE SURGERY      left foot    JOINT REPLACEMENT      total left knee    KNEE ARTHROPLASTY Left 2013    totoal joint arthroplasty left knee with platelet gel and nerve block    NERVE BLOCK  7 11 12    caudal #1    NERVE BLOCK  7  12    caudal #2    NERVE BLOCK  12    therapeutic caudal with epiduragram    NERVE BLOCK  2018    lumbar epidural injection #1    NERVE BLOCK Right 2018    lumbar medial branch block  #1    POLYPECTOMY      removed from uterus    SD INJ DX/THER AGNT PARAVERT FACET JOINT, LUMBAR/SAC, 1ST LEVEL Right 2018    RIGHT LUMBAR L2,3,4,MEDIAL BRANCH BLOCK #1 performed by Estela Crook MD at [address] [place] [address] NOSE/CLEFT LIP/TIP N/A 2018    L3-4 LUMBAR EPIDURAL STEROID INJECTION #1 performed by Estela Crook MD at [facility]         Prior to Admission medications    Medication Sig Start Date End Date Taking?  Authorizing Provider   oxybutynin (DITROPAN) 5 MG tablet  19  Yes Historical Provider, MD   ACCU-CHEBALWINDER SOFTCLIX LANCETS [facility]  19  Yes Historical Provider, MD   Lancets Misc. (ACCU-CHEK SOFTCLIX LANCET DEV) KIT  19  Yes Historical Provider, MD   montelukast (SINGULAIR) 10 MG difficulty     HEENT:     Head:normocephalic and atraumatic  Pupils:regular, round and equal.  Sclera: icterus absent     Abdomen:     Shape: Obese and non-distended  Tenderness:none  Guarding:none     Cervical spine:     Inspection: Normal  Palpation: Tenderness paravertebral muscles, facet loading, left, right, positive and tenderness. Range of motion: abnormal moderately flexion, extension rotation bilateral and is painful. Lumbar spine:     Spine inspection: Scoliosis  CVA tenderness: None  Palpation: Tenderness paravertebral muscles, facet loading, left, right, positive and tenderness. Range of motion: Abnormal moderately Lateral bending, flexion, extension rotation bilateral and is painful. Musculoskeletal:     Trigger points in trapezius: absent bilaterally  Trigger points in rhomboids: absent bilaterally  Trigger points in Paravertebral: absent bilaterally  Trigger points in supraspinatus/infraspinatus: absent  Spurling's: Negative  Wahl's: Negative   SI joint tenderness: Positive Right  Jonathan test: Not tested  Piriformis tenderness: Negative  Trochanteric bursa tenderness: Negative  SLR: Negative, sitting     Extremities:     Tremors: None bilaterally upper and lower  Range of motion: Generally normal shoulders, pain with internal rotation of hips Negative  Intact: Yes  Edema: Normal    Knee:     Inspection:asymmetric, swelling mild right, Right incisional scar  Tenderness of Bony Landmarks:Diffuse, right  Tenderness of Bursa:Anterior, right  Drawer Test:negative  Effusion:absent right  Crepitus:absent right  ROM:Left limited by pain Right      Neurological:     Sensory: Normal to light touch bilateral upper and lower extremities  Motor:  Right Grip5/5  Left Grip5/5  Right Bicep5/5  Left Bicep5/5  Right Triceps5/5  Left Triceps5/5  Right Deltoid5/5  Left Deltoid5/5  Right Quadriceps5/5  Left Quadriceps5/5  Right Gastrocnemius5/5  Left Gastrocnemius5/5  Right Ant Tibialis5/5  Left Ant

## 2019-09-03 ENCOUNTER — HOSPITAL ENCOUNTER (OUTPATIENT)
Age: 69
Discharge: HOME | End: 2019-09-03
Payer: MEDICARE

## 2019-09-03 LAB
ALBUMIN SERPL-MCNC: 3.8 G/DL (ref 3.5–5.2)
ALP BLD-CCNC: 69 U/L (ref 35–104)
ALT SERPL-CCNC: 15 U/L (ref 0–32)
ANION GAP SERPL CALCULATED.3IONS-SCNC: 11 MMOL/L (ref 7–16)
AST SERPL-CCNC: 18 U/L (ref 0–31)
BASOPHILS ABSOLUTE: 0.03 E9/L (ref 0–0.2)
BASOPHILS RELATIVE PERCENT: 0.7 % (ref 0–2)
BILIRUB SERPL-MCNC: 0.3 MG/DL (ref 0–1.2)
BUN BLDV-MCNC: 16 MG/DL (ref 8–23)
CALCIUM SERPL-MCNC: 9.2 MG/DL (ref 8.6–10.2)
CHLORIDE BLD-SCNC: 104 MMOL/L (ref 98–107)
CHOLESTEROL, FASTING: 150 MG/DL (ref 0–199)
CO2: 27 MMOL/L (ref 22–29)
CREAT SERPL-MCNC: 1.1 MG/DL (ref 0.5–1)
EOSINOPHILS ABSOLUTE: 0.14 E9/L (ref 0.05–0.5)
EOSINOPHILS RELATIVE PERCENT: 3.1 % (ref 0–6)
GFR AFRICAN AMERICAN: 59
GFR NON-AFRICAN AMERICAN: 49 ML/MIN/1.73
GLUCOSE FASTING: 134 MG/DL (ref 74–99)
HBA1C MFR BLD: 6.2 % (ref 4–5.6)
HCT VFR BLD CALC: 37.2 % (ref 34–48)
HDLC SERPL-MCNC: 64 MG/DL
HEMOGLOBIN: 11.5 G/DL (ref 11.5–15.5)
IMMATURE GRANULOCYTES #: 0.01 E9/L
IMMATURE GRANULOCYTES %: 0.2 % (ref 0–5)
LDL CHOLESTEROL CALCULATED: 72 MG/DL (ref 0–99)
LYMPHOCYTES ABSOLUTE: 1.37 E9/L (ref 1.5–4)
LYMPHOCYTES RELATIVE PERCENT: 30.7 % (ref 20–42)
MCH RBC QN AUTO: 26.7 PG (ref 26–35)
MCHC RBC AUTO-ENTMCNC: 30.9 % (ref 32–34.5)
MCV RBC AUTO: 86.3 FL (ref 80–99.9)
MONOCYTES ABSOLUTE: 0.44 E9/L (ref 0.1–0.95)
MONOCYTES RELATIVE PERCENT: 9.9 % (ref 2–12)
NEUTROPHILS ABSOLUTE: 2.47 E9/L (ref 1.8–7.3)
NEUTROPHILS RELATIVE PERCENT: 55.4 % (ref 43–80)
PDW BLD-RTO: 14.7 FL (ref 11.5–15)
PLATELET # BLD: 265 E9/L (ref 130–450)
PMV BLD AUTO: 11.2 FL (ref 7–12)
POTASSIUM SERPL-SCNC: 4.3 MMOL/L (ref 3.5–5)
RBC # BLD: 4.31 E12/L (ref 3.5–5.5)
SODIUM BLD-SCNC: 142 MMOL/L (ref 132–146)
T4 TOTAL: 6.1 MCG/DL (ref 4.5–11.7)
TOTAL PROTEIN: 7 G/DL (ref 6.4–8.3)
TRIGLYCERIDE, FASTING: 72 MG/DL (ref 0–149)
TSH SERPL DL<=0.05 MIU/L-ACNC: 3.02 UIU/ML (ref 0.27–4.2)
VITAMIN B-12: 571 PG/ML (ref 211–946)
VITAMIN D 25-HYDROXY: 39 NG/ML (ref 30–100)
VLDLC SERPL CALC-MCNC: 14 MG/DL
WBC # BLD: 4.5 E9/L (ref 4.5–11.5)

## 2019-09-03 PROCEDURE — 82306 VITAMIN D 25 HYDROXY: CPT

## 2019-09-03 PROCEDURE — 80061 LIPID PANEL: CPT

## 2019-09-03 PROCEDURE — 83036 HEMOGLOBIN GLYCOSYLATED A1C: CPT

## 2019-09-03 PROCEDURE — 80053 COMPREHEN METABOLIC PANEL: CPT

## 2019-09-03 PROCEDURE — 36415 COLL VENOUS BLD VENIPUNCTURE: CPT

## 2019-09-03 PROCEDURE — 84443 ASSAY THYROID STIM HORMONE: CPT

## 2019-09-03 PROCEDURE — 85025 COMPLETE CBC W/AUTO DIFF WBC: CPT

## 2019-09-03 PROCEDURE — 82607 VITAMIN B-12: CPT

## 2019-09-03 PROCEDURE — 84436 ASSAY OF TOTAL THYROXINE: CPT

## 2019-09-19 ENCOUNTER — HOSPITAL ENCOUNTER (OUTPATIENT)
Dept: CT IMAGING | Age: 69
Discharge: HOME | End: 2019-09-19
Payer: MEDICARE

## 2019-09-19 DIAGNOSIS — R05.9 COUGH: ICD-10-CM

## 2019-09-19 PROCEDURE — 71250 CT THORAX DX C-: CPT

## 2019-10-14 ENCOUNTER — OFFICE VISIT (OUTPATIENT)
Dept: PAIN MANAGEMENT | Age: 69
End: 2019-10-14
Payer: MEDICARE

## 2019-10-14 ENCOUNTER — TELEPHONE (OUTPATIENT)
Dept: PAIN MANAGEMENT | Age: 69
End: 2019-10-14

## 2019-10-14 VITALS
DIASTOLIC BLOOD PRESSURE: 70 MMHG | RESPIRATION RATE: 16 BRPM | HEIGHT: 65 IN | WEIGHT: 230 LBS | OXYGEN SATURATION: 97 % | HEART RATE: 90 BPM | BODY MASS INDEX: 38.32 KG/M2 | TEMPERATURE: 99 F | SYSTOLIC BLOOD PRESSURE: 140 MMHG

## 2019-10-14 DIAGNOSIS — Z96.652 H/O TOTAL KNEE REPLACEMENT, LEFT: ICD-10-CM

## 2019-10-14 DIAGNOSIS — M47.816 LUMBAR FACET ARTHROPATHY: ICD-10-CM

## 2019-10-14 DIAGNOSIS — M48.07 SPINAL STENOSIS OF LUMBOSACRAL REGION: ICD-10-CM

## 2019-10-14 DIAGNOSIS — M51.36 DDD (DEGENERATIVE DISC DISEASE), LUMBAR: ICD-10-CM

## 2019-10-14 DIAGNOSIS — M48.061 NEURAL FORAMINAL STENOSIS OF LUMBAR SPINE: ICD-10-CM

## 2019-10-14 DIAGNOSIS — G89.29 CHRONIC BILATERAL LOW BACK PAIN WITH RIGHT-SIDED SCIATICA: ICD-10-CM

## 2019-10-14 DIAGNOSIS — M54.16 LUMBAR RADICULOPATHY: ICD-10-CM

## 2019-10-14 DIAGNOSIS — M54.2 NECK PAIN: ICD-10-CM

## 2019-10-14 DIAGNOSIS — M54.41 CHRONIC BILATERAL LOW BACK PAIN WITH RIGHT-SIDED SCIATICA: ICD-10-CM

## 2019-10-14 DIAGNOSIS — M50.30 DEGENERATIVE CERVICAL DISC: ICD-10-CM

## 2019-10-14 DIAGNOSIS — M48.062 SPINAL STENOSIS OF LUMBAR REGION WITH NEUROGENIC CLAUDICATION: Primary | ICD-10-CM

## 2019-10-14 DIAGNOSIS — M47.812 CERVICAL FACET JOINT SYNDROME: ICD-10-CM

## 2019-10-14 DIAGNOSIS — M51.36 LUMBAR DEGENERATIVE DISC DISEASE: ICD-10-CM

## 2019-10-14 PROCEDURE — G8484 FLU IMMUNIZE NO ADMIN: HCPCS | Performed by: PAIN MEDICINE

## 2019-10-14 PROCEDURE — G8427 DOCREV CUR MEDS BY ELIG CLIN: HCPCS | Performed by: PAIN MEDICINE

## 2019-10-14 PROCEDURE — 1090F PRES/ABSN URINE INCON ASSESS: CPT | Performed by: PAIN MEDICINE

## 2019-10-14 PROCEDURE — 1123F ACP DISCUSS/DSCN MKR DOCD: CPT | Performed by: PAIN MEDICINE

## 2019-10-14 PROCEDURE — 1036F TOBACCO NON-USER: CPT | Performed by: PAIN MEDICINE

## 2019-10-14 PROCEDURE — 3017F COLORECTAL CA SCREEN DOC REV: CPT | Performed by: PAIN MEDICINE

## 2019-10-14 PROCEDURE — 99213 OFFICE O/P EST LOW 20 MIN: CPT | Performed by: PAIN MEDICINE

## 2019-10-14 PROCEDURE — G8417 CALC BMI ABV UP PARAM F/U: HCPCS | Performed by: PAIN MEDICINE

## 2019-10-14 PROCEDURE — 4040F PNEUMOC VAC/ADMIN/RCVD: CPT | Performed by: PAIN MEDICINE

## 2019-10-14 PROCEDURE — G8399 PT W/DXA RESULTS DOCUMENT: HCPCS | Performed by: PAIN MEDICINE

## 2019-10-14 RX ORDER — HYDROCODONE BITARTRATE AND ACETAMINOPHEN 5; 325 MG/1; MG/1
1 TABLET ORAL 2 TIMES DAILY PRN
Qty: 60 TABLET | Refills: 0 | Status: SHIPPED | OUTPATIENT
Start: 2019-10-14 | End: 2019-12-09 | Stop reason: SDUPTHER

## 2019-10-14 RX ORDER — VALSARTAN 80 MG/1
TABLET ORAL
Refills: 3 | COMMUNITY
Start: 2019-09-25 | End: 2022-10-12

## 2019-10-31 ENCOUNTER — TELEPHONE (OUTPATIENT)
Dept: PAIN MANAGEMENT | Age: 69
End: 2019-10-31

## 2019-12-09 ENCOUNTER — TELEPHONE (OUTPATIENT)
Dept: PAIN MANAGEMENT | Age: 69
End: 2019-12-09

## 2019-12-09 ENCOUNTER — OFFICE VISIT (OUTPATIENT)
Dept: PAIN MANAGEMENT | Age: 69
End: 2019-12-09
Payer: MEDICARE

## 2019-12-09 VITALS
OXYGEN SATURATION: 91 % | DIASTOLIC BLOOD PRESSURE: 74 MMHG | BODY MASS INDEX: 39.82 KG/M2 | HEIGHT: 65 IN | SYSTOLIC BLOOD PRESSURE: 130 MMHG | WEIGHT: 239 LBS | HEART RATE: 88 BPM | TEMPERATURE: 97.8 F | RESPIRATION RATE: 16 BRPM

## 2019-12-09 DIAGNOSIS — M48.062 SPINAL STENOSIS OF LUMBAR REGION WITH NEUROGENIC CLAUDICATION: ICD-10-CM

## 2019-12-09 DIAGNOSIS — M54.41 CHRONIC BILATERAL LOW BACK PAIN WITH RIGHT-SIDED SCIATICA: ICD-10-CM

## 2019-12-09 DIAGNOSIS — M51.36 DDD (DEGENERATIVE DISC DISEASE), LUMBAR: ICD-10-CM

## 2019-12-09 DIAGNOSIS — G89.29 CHRONIC BILATERAL LOW BACK PAIN WITH RIGHT-SIDED SCIATICA: ICD-10-CM

## 2019-12-09 DIAGNOSIS — Z96.652 H/O TOTAL KNEE REPLACEMENT, LEFT: ICD-10-CM

## 2019-12-09 PROCEDURE — 4040F PNEUMOC VAC/ADMIN/RCVD: CPT | Performed by: NURSE PRACTITIONER

## 2019-12-09 PROCEDURE — G8484 FLU IMMUNIZE NO ADMIN: HCPCS | Performed by: NURSE PRACTITIONER

## 2019-12-09 PROCEDURE — G8417 CALC BMI ABV UP PARAM F/U: HCPCS | Performed by: NURSE PRACTITIONER

## 2019-12-09 PROCEDURE — G8399 PT W/DXA RESULTS DOCUMENT: HCPCS | Performed by: NURSE PRACTITIONER

## 2019-12-09 PROCEDURE — G8427 DOCREV CUR MEDS BY ELIG CLIN: HCPCS | Performed by: NURSE PRACTITIONER

## 2019-12-09 PROCEDURE — 1123F ACP DISCUSS/DSCN MKR DOCD: CPT | Performed by: NURSE PRACTITIONER

## 2019-12-09 PROCEDURE — 99213 OFFICE O/P EST LOW 20 MIN: CPT | Performed by: NURSE PRACTITIONER

## 2019-12-09 PROCEDURE — 1090F PRES/ABSN URINE INCON ASSESS: CPT | Performed by: NURSE PRACTITIONER

## 2019-12-09 PROCEDURE — 3017F COLORECTAL CA SCREEN DOC REV: CPT | Performed by: NURSE PRACTITIONER

## 2019-12-09 PROCEDURE — 1036F TOBACCO NON-USER: CPT | Performed by: NURSE PRACTITIONER

## 2019-12-09 RX ORDER — AZELASTINE 1 MG/ML
SPRAY, METERED NASAL
Refills: 2 | COMMUNITY
Start: 2019-11-04 | End: 2020-02-03

## 2019-12-09 RX ORDER — HYDROCODONE BITARTRATE AND ACETAMINOPHEN 5; 325 MG/1; MG/1
1 TABLET ORAL 2 TIMES DAILY PRN
Qty: 60 TABLET | Refills: 0 | Status: SHIPPED | OUTPATIENT
Start: 2019-12-09 | End: 2020-01-08

## 2019-12-09 RX ORDER — PANTOPRAZOLE SODIUM 40 MG/1
TABLET, DELAYED RELEASE ORAL
Refills: 3 | COMMUNITY
Start: 2019-11-29 | End: 2022-10-25

## 2019-12-09 RX ORDER — FAMOTIDINE 40 MG/1
TABLET, FILM COATED ORAL
Refills: 3 | COMMUNITY
Start: 2019-11-29 | End: 2022-10-25

## 2019-12-09 RX ORDER — TOLTERODINE 2 MG/1
CAPSULE, EXTENDED RELEASE ORAL
COMMUNITY
Start: 2019-10-22 | End: 2022-10-25

## 2019-12-17 ENCOUNTER — HOSPITAL ENCOUNTER (OUTPATIENT)
Dept: PULMONOLOGY | Age: 69
Discharge: HOME | End: 2019-12-17
Payer: MEDICARE

## 2019-12-17 PROCEDURE — 6360000002 HC RX W HCPCS: Performed by: INTERNAL MEDICINE

## 2019-12-17 PROCEDURE — 94070 EVALUATION OF WHEEZING: CPT

## 2019-12-17 RX ADMIN — METHACHOLINE CHLORIDE 100 MG: 100 POWDER, FOR SOLUTION RESPIRATORY (INHALATION) at 14:29

## 2020-01-09 ENCOUNTER — HOSPITAL ENCOUNTER (EMERGENCY)
Age: 70
Discharge: HOME | End: 2020-01-09
Attending: EMERGENCY MEDICINE
Payer: MEDICARE

## 2020-01-09 VITALS
WEIGHT: 239 LBS | HEART RATE: 83 BPM | HEIGHT: 64 IN | BODY MASS INDEX: 40.8 KG/M2 | OXYGEN SATURATION: 97 % | DIASTOLIC BLOOD PRESSURE: 84 MMHG | SYSTOLIC BLOOD PRESSURE: 187 MMHG | TEMPERATURE: 98 F | RESPIRATION RATE: 16 BRPM

## 2020-01-09 PROCEDURE — 99283 EMERGENCY DEPT VISIT LOW MDM: CPT

## 2020-01-10 NOTE — ED PROVIDER NOTES
history includes Cancer in an other family member; Diabetes in her mother and another family member; Heart Disease in an other family member; High Blood Pressure in her father; Hypertension in an other family member; Mental Illness in her mother; Stroke in her father. The patients home medications have been reviewed. Allergies: Penicillins    -------------------------------------------------- RESULTS -------------------------------------------------  All laboratory and radiology results have been personally reviewed by myself   LABS:  No results found for this visit on 01/09/20. RADIOLOGY:  Interpreted by Radiologist.  No orders to display       ------------------------- NURSING NOTES AND VITALS REVIEWED ---------------------------   The nursing notes within the ED encounter and vital signs as below have been reviewed. BP (!) 187/84   Pulse 83   Temp 98 °F (36.7 °C) (Oral)   Resp 16   Ht 5' 4\" (1.626 m)   Wt 239 lb (108.4 kg)   SpO2 97%   BMI 41.02 kg/m²   Oxygen Saturation Interpretation: Normal      ---------------------------------------------------PHYSICAL EXAM--------------------------------------      Constitutional/General: Alert and oriented x3, well appearing, non toxic in NAD  Head: Normocephalic and atraumatic  Eyes: PERRL, EOMI  Mouth: Oropharynx clear, handling secretions,   Neck: Supple, full ROM,   Pulmonary: Lungs clear to auscultation bilaterally, no wheezes, rales, or rhonchi. Not in respiratory distress  Cardiovascular:  Regular rate and rhythm, no murmurs, gallops, or rubs. Abdomen: Soft, non tender, non distended,   Extremities: Moves all extremities x 4.  Warm and well perfused  Skin: warm and dry without rash  Neurologic: GCS 15, nonfocal  Psych: Normal Affect      ------------------------------ ED COURSE/MEDICAL DECISION MAKING----------------------  Medications - No data to display      ED COURSE:       Medical Decision Making:    Patient with elevated blood pressure

## 2020-01-24 ENCOUNTER — HOSPITAL ENCOUNTER (OUTPATIENT)
Dept: GENERAL RADIOLOGY | Age: 70
Discharge: HOME | End: 2020-01-26
Payer: MEDICARE

## 2020-01-24 PROCEDURE — 74230 X-RAY XM SWLNG FUNCJ C+: CPT

## 2020-01-24 PROCEDURE — 92526 ORAL FUNCTION THERAPY: CPT | Performed by: SPEECH-LANGUAGE PATHOLOGIST

## 2020-01-24 PROCEDURE — 92611 MOTION FLUOROSCOPY/SWALLOW: CPT | Performed by: SPEECH-LANGUAGE PATHOLOGIST

## 2020-01-24 PROCEDURE — 2500000003 HC RX 250 WO HCPCS: Performed by: INTERNAL MEDICINE

## 2020-01-24 RX ADMIN — BARIUM SULFATE 88 G: 960 POWDER, FOR SUSPENSION ORAL at 14:13

## 2020-01-24 RX ADMIN — BARIUM SULFATE 45 G: 0.6 CREAM ORAL at 14:13

## 2020-01-24 NOTE — PROGRESS NOTES
SPEECH/LANGUAGE PATHOLOGY  VIDEOFLUOROSCOPIC STUDY OF SWALLOWING (MBS)    PATIENT NAME:  Baldomero Ballesteros      :  1950      TODAY'S DATE:  2020  ROOM:  Room/bed info not found    SUMMARY OF EVALUATION     DYSPHAGIA DIAGNOSIS:  Mild pharyngeal dysphagia       DIET RECOMMENDATIONS:  Regular consistency solids with  thin liquids     FEEDING RECOMMENDATIONS:     Assistance level:  No assistance needed      Compensatory strategies recommended: Double swallow, Small bites/sips, Throat clear and Effortful swallow    THERAPY RECOMMENDATIONS:      Dysphagia therapy is recommended 2 times per week with emphasis on the following, To improve laryngeal elevation and closure and To provide DPNS    Patient report:  Choking even after scope was completed     Diet prior to admit:    Regular consistency solids with  thin liquids    Communication/Cognition:  Within normal limits               PROCEDURE       MBSImP Results:   Lip closure for intraoral bolus containment resulted in no labial escape. Tongue control during bolus hold maintained a cohesive bolus held between tongue to palate seal. Bolus preparation and mastication resulted in timely and efficient chewing and mashing. Bolus transport/lingual motion was with brisk tongue motion. Oral residue was not observed. Initiation of the pharyngeal swallow occurred as the bolus head reached the posterior angle of the mandibular ramus. Soft palate elevation resulted in no bolus between the soft palate and the pharyngeal wall. Laryngeal elevation demonstrated complete superior movement of the thyroid cartilage with complete approximation of the arytenoids to the epiglottic petiole. Anterior hyoid excursion demonstrated complete anterior movement. Epiglottic movement resulted in complete inversion. Laryngeal vestibule closure was incomplete, as indicated by a wide column of air or contrast within the laryngeal vestibule at the height of the swallow.  Pharyngeal stripping wave was present and complete. Pharyngeal contraction could not be determined due to logistical reasons not related to physiologic impairment. Pharyngoesophageal segment opening was completely distended for complete duration with no obstruction of bolus flow. Tongue base retraction allowed a narrow column of contrast between the retracted tongue base and the posterior pharyngeal wall. Pharyngeal residue was not present with solids in the valleculae. Esophageal clearance in the upright position could not be assessed due to logistical reasons not related to physiologic impairment. Laryngeal Penetration and Aspiration:  Neither penetration nor aspiration was observed in today's study with Cookie, Pudding-thick,     Wide consistent Penetration with  Nectar-thick, Thin but no aspiration. Current Respiratory Status   room air         COMPENSATORY STRATEGIES       Compensatory strategies that were beneficial included Small bites/sips      STRUCTURAL/FUNCTIONAL ANOMALIES       No structural/functional anomalies were noted                                 The Speech Language Pathologist (SLP) completed education with the patient regarding results of evaluation. Explained that Speech Pathology intervention is not warranted at this time, Prognosis for improvements is good, This plan will be re-evaluated and revised in 1 week  if warranted. , Patient stated goals: Agreed with above, Treatment goals discussed with Patient, The Patient understand the diagnosis, prognosis and plan of care. Evaluation time includes  review of current medical information, gathering information on past medical history/social history and prior level of function, completion of standardized testing/informal observation of tasks, assessment of data, and development of POC/Goals. [x]The admitting diagnosis and active problem list, as listed below have been reviewed prior to initiation of this evaluation.      ADMITTING DIAGNOSIS:

## 2020-01-31 ENCOUNTER — HOSPITAL ENCOUNTER (OUTPATIENT)
Dept: GENERAL RADIOLOGY | Age: 70
End: 2020-01-31
Payer: MEDICARE

## 2020-01-31 ENCOUNTER — HOSPITAL ENCOUNTER (OUTPATIENT)
Dept: GENERAL RADIOLOGY | Age: 70
Discharge: HOME | End: 2020-02-02
Payer: MEDICARE

## 2020-01-31 ENCOUNTER — HOSPITAL ENCOUNTER (OUTPATIENT)
Dept: MAMMOGRAPHY | Age: 70
Discharge: HOME | End: 2020-02-02
Payer: MEDICARE

## 2020-01-31 ENCOUNTER — HOSPITAL ENCOUNTER (OUTPATIENT)
Age: 70
Discharge: HOME | End: 2020-02-02
Payer: MEDICARE

## 2020-01-31 ENCOUNTER — TELEPHONE (OUTPATIENT)
Dept: SLEEP CENTER | Age: 70
End: 2020-01-31

## 2020-01-31 PROCEDURE — 73030 X-RAY EXAM OF SHOULDER: CPT

## 2020-01-31 PROCEDURE — 77063 BREAST TOMOSYNTHESIS BI: CPT

## 2020-01-31 PROCEDURE — 73070 X-RAY EXAM OF ELBOW: CPT

## 2020-02-03 ENCOUNTER — OFFICE VISIT (OUTPATIENT)
Dept: PAIN MANAGEMENT | Age: 70
End: 2020-02-03
Payer: MEDICARE

## 2020-02-03 VITALS
SYSTOLIC BLOOD PRESSURE: 120 MMHG | HEART RATE: 92 BPM | HEIGHT: 65 IN | RESPIRATION RATE: 16 BRPM | WEIGHT: 239 LBS | DIASTOLIC BLOOD PRESSURE: 78 MMHG | OXYGEN SATURATION: 93 % | BODY MASS INDEX: 39.82 KG/M2 | TEMPERATURE: 98.1 F

## 2020-02-03 PROCEDURE — 99213 OFFICE O/P EST LOW 20 MIN: CPT | Performed by: PAIN MEDICINE

## 2020-02-03 RX ORDER — AZELASTINE HYDROCHLORIDE AND FLUTICASONE PROPIONATE 137; 50 UG/1; UG/1
SPRAY, METERED NASAL
COMMUNITY
Start: 2020-01-10 | End: 2020-06-09 | Stop reason: ALTCHOICE

## 2020-02-03 RX ORDER — HYDROCODONE BITARTRATE AND ACETAMINOPHEN 5; 325 MG/1; MG/1
1 TABLET ORAL 2 TIMES DAILY PRN
Qty: 60 TABLET | Refills: 0 | Status: SHIPPED | OUTPATIENT
Start: 2020-02-03 | End: 2020-03-04

## 2020-02-03 NOTE — PROGRESS NOTES
Lizett Weir presents to the [address] on 2/3/2020. Ninoska Garcia is complaining of pain in lower back, down buttocks and into her rectum. . The pain is constant. The pain is described as aching and shooting. Pain is rated on her best day at a 3, on her worst day at a 9, and on average at a 5 on the VAS scale. She took her last dose of Tylenol this AM.. Ninoska Garcia does not have issues with constipation. Any procedures since your last visit: No,    She is not on NSAIDS and  is not on anticoagulation medications to include none and is managed by NA. Pacemaker or defibrilator: No Physician managing device is NA.       /78   Pulse 92   Temp 98.1 °F (36.7 °C) (Oral)   Resp 16   Ht 5' 5\" (1.651 m)   Wt 239 lb (108.4 kg)   SpO2 93%   BMI 39.77 kg/m²      No LMP recorded.  Patient is postmenopausal.
[facility]  [address] [facility], [address] [place] [address]  [phone]    Follow up Note    Timothy Sadler     Date of Visit:  2/3/2020    CC:  Patient presents for follow up   Chief Complaint   Patient presents with    Lower Back Pain     HPI:    Follow up on her chronic neck/low back and Left knee pain  Change in quality of symptoms:no. Patient satisfaction with analgesia:Fair  Medication side effects:none   Recent diagnostic testing: Left shoulder and Left elbow  Recent interventional procedures:none    She has been on anticoagulation medications to include ASA and has not been on herbal supplements. She is diabetic.     Imagin/2019 XR Left Knee: Total knee arthroplasty is identified without patellar resurfacing. There is normal alignment.  No loosening noted.  Mild soft tissue   swelling and joint effusion is noted. Cervical spine Xray 2015  Multilevel spondylosis    Lumbar spine CT   IMPRESSION: Multilevel disc bulging and facet joint arthropathy   with spondylosis resulting in varying degrees of foraminal and   spinal canal stenosis      Lumbar spine MRI 2018  1. Levoconvex curvature with multilevel lumbar spondylosis, as   described above, most pronounced at L4-L5, where there is a diffuse   disc bulge with facet hypertrophy resulting in severe central canal   narrowing. 2. Mild central canal narrowing at L2-L3 and L3-L4. 3. Mild to moderate right foraminal narrowing at L2-L3. 4. Mild bilateral foraminal narrowing at L4-L5. Mild right foraminal   narrowing at T12-L1 and L5-S1.      Left shoulder Xray 2020  IMPRESSION   1. Degenerative changes of the left AC joint. Left elbow Xray 2020  1. There is no fracture or dislocation. 2. Degenerative joint disease. Previous treatments: Physical Therapy, Epidural Steroid Injection and medications. .         Potential Aberrant Drug-Related Behavior: No    Urine Drug Screening:  Buccal
Constitutional: thin.   Eyes: no conjunctival injection, symmetric gaze.   ENT: mucous membranes moist, no mouth sores or mucosal bleeding, normal dentition, symmetric facies.   Neck: no thyromegaly or masses appreciated.   Pulmonary: clear to auscultation bilaterally, no wheezing . Patient able to comfortably lay flat.   Cardiac: No murmurs, rubs, gallops.   Vascular: no JVD, no calf tenderness, venous stasis changes, varices and capillary refill < 3 seconds.   Chest: Mediport . Some pain with palpation of L chest.   Abdomen: normoactive bowel sounds, soft and nontender, no hepatosplenomegaly or masses appreciated.   Genitourinary: , No testicular masses noted.   Lymphatic: no adenopathy appreciated.   Musculoskeletal: full range of motion and no deformities appreciated, no masses and normal strength in all extremities.   Skin: normal appearance, no rash, nodules, vesicles, ulcers, erythema.   Neurology: no focal deficits.   Psychiatric: affect appropriate.

## 2020-02-05 ENCOUNTER — TELEPHONE (OUTPATIENT)
Dept: SPEECH THERAPY | Age: 70
End: 2020-02-05

## 2020-02-05 NOTE — TELEPHONE ENCOUNTER
Spoke with patient; She is scheduled for swallowing evaluation on 02/13/2020 at [address] [place] [address].  Karen Gamino MA/CCC-SLP  [id number]

## 2020-02-10 ENCOUNTER — HOSPITAL ENCOUNTER (OUTPATIENT)
Dept: SLEEP CENTER | Age: 70
Discharge: HOME | End: 2020-02-10
Payer: MEDICARE

## 2020-02-10 PROCEDURE — 95806 SLEEP STUDY UNATT&RESP EFFT: CPT

## 2020-02-13 ENCOUNTER — EVALUATION (OUTPATIENT)
Dept: SPEECH THERAPY | Age: 70
End: 2020-02-13
Payer: MEDICARE

## 2020-02-13 PROCEDURE — 92610 EVALUATE SWALLOWING FUNCTION: CPT | Performed by: SPEECH-LANGUAGE PATHOLOGIST

## 2020-02-18 NOTE — PROGRESS NOTES
required   []Supervision with all PO intake    [x]Double swallow    []Chin tuck  []Multiple swallow     []Feed in upright position   [x]Small bites/sips   []Feed reclined 45 degrees_  []Alternate solids / liquids  []Check for oral pocketing  []No straw    [x]Throat clear       []Place food on left side  []Place food on right side  []Spoon sip liquids   [x]Effortful swallow  []Bruner water protocol  []Modified [place] water protocol     []Administer meds (crushedwhole) with: __   []Administer meds via feeding tube  []Other    THERAPY RECOMMENDATIONS:     NOTE:  Olivia Curtis informed this therapist that she will be traveling to [place] and staying with a friend for about 1 month. She will contact this therapist upon her return to set up future appointments. []  Therapy is not recommended     [x]  Therapy is recommended 1-2x/week for 6-8 weeks to:   []  Improve oral motor strength and range of motion   []  Improve tongue base retraction   [x]  Improve laryngeal strength and range of motion   []  Repeat bedside swallow study in   []  Video Swallowing Evaluation is recommended and requires a Physician order  [x]  Other:   Tolerate DPNS; develop, implement and monitor for compliance to safe swallow protocol    []  Therapy at the discretion of facility/treating Speech Pathologist                 CURRENT RESPIRATORY STATUS:  [x]  Room Air   []  Nasal cannula [] O2 mask  []  Non-rebreather mask  []  BiPAP     []  Tracheostomy  []  Vent dependent    CURRENT NUTRITIONAL STATUS:    [x]  Oral  []  NPO    COGNITIVE STATUS:  [x]  Intact  []  Confused []  Aphasic  []  Cognitive deficits     ORAL PERIPHERAL STATUS:     [x]  [facility]     []  Adequate lingual/labial strength  []  Generalized oral weakness  []  Right labiobuccal weakness   []  Left labiobuccal weakness    []  Right lingual deviation    []  Left lingual deviation   []  Decreased velar elevation              []  Decreased lingual coordination               []  Oral apraxia Diagnosis    Lumbar radiculopathy    Low back pain    Left Knee Dysfunction    DDD (degenerative disc disease), lumbar L3-4, L4-5, L5-S1    Protruded lumbar disc L3-4, L4-5, L5-S1    Neural foraminal stenosis of lumbar spine L3-4, L4-5, L5-S1      Lumbar Spinal Stenosis    DJD (degenerative joint disease) of knee    Knee pain    DM (diabetes mellitus) ([address]    HTN (hypertension), benign    Anemia associated with acute blood loss    S/P total knee arthroplasty    Localized osteoarthrosis, lower leg    Neck pain    Degenerative cervical disc    Cervical facet joint syndrome    Chronic bilateral low back pain with right-sided sciatica    Lumbar degenerative disc disease    Lumbar facet arthropathy    Spinal stenosis of lumbar region with neurogenic claudication           Ava Saeed MA/[facility]  [id number]      I CERTIFY THAT THIS EVALUATION AND PLAN OF CARE FOR SPEECH THERAPY SERVICES ARE APPROPRIATE AND MEDICALLY NECESSARY.     DURATION:  FROM:__________02/13/2020_____________THRU______05/12/2020__________________              ________________________________________________________________________  PHYSICIAN'S SIGNATURE                                                                         DATE

## 2020-03-16 ENCOUNTER — OFFICE VISIT (OUTPATIENT)
Dept: PAIN MANAGEMENT | Age: 70
End: 2020-03-16
Payer: MEDICARE

## 2020-03-16 VITALS
HEART RATE: 75 BPM | TEMPERATURE: 98.3 F | WEIGHT: 239 LBS | OXYGEN SATURATION: 94 % | DIASTOLIC BLOOD PRESSURE: 72 MMHG | HEIGHT: 65 IN | RESPIRATION RATE: 16 BRPM | SYSTOLIC BLOOD PRESSURE: 122 MMHG | BODY MASS INDEX: 39.82 KG/M2

## 2020-03-16 PROCEDURE — 99213 OFFICE O/P EST LOW 20 MIN: CPT | Performed by: PAIN MEDICINE

## 2020-03-16 RX ORDER — HYDROCODONE BITARTRATE AND ACETAMINOPHEN 5; 325 MG/1; MG/1
1 TABLET ORAL 2 TIMES DAILY PRN
Qty: 60 TABLET | Refills: 0 | Status: SHIPPED | OUTPATIENT
Start: 2020-03-16 | End: 2020-04-15

## 2020-03-16 NOTE — PROGRESS NOTES
Branden Jones presents to the [facility] on 3/16/2020. Shira Aparicio is complaining of pain lower back right buttock   And down right leg. The pain is constant. The pain is described as aching, dull and burning. Pain is rated on her best day at a 4, on her worst day at a 9, and on average at a 7 on the VAS scale. She took her last dose of Norco and Neurontin last night. Shira Aparicio does not have issues with constipation. Any procedures since your last visit: No, with  % relief. She is not on NSAIDS and  is not on anticoagulation medications to include none and is managed by . Pacemaker or defibrilator: No Physician managing device is . /72   Pulse 75   Temp 98.3 °F (36.8 °C) (Oral)   Resp 16   Ht 5' 5\" (1.651 m)   Wt 239 lb (108.4 kg)   SpO2 94%   BMI 39.77 kg/m²      No LMP recorded.  Patient is postmenopausal.

## 2020-03-16 NOTE — PROGRESS NOTES
Screening:  Buccal 1/19 negative for all as expected  7/2019 Buccal consistent   12/2019 Buccal screen consistent for Hydrocodone     OARRS report:  02/2020 Consistent   03/2020 consistent     Allergies   Allergen Reactions    Penicillins Rash     Prior to Admission medications    Medication Sig Start Date End Date Taking? Authorizing Provider   HYDROcodone-acetaminophen (NORCO) 5-325 MG per tablet Take 1 tablet by mouth 2 times daily as needed for Pain for up to 30 days. 3/16/20 4/15/20 Yes Johnnie Burch MD   DYMISTA 137-50 MCG/ACT SUSP USE ONE SPRAY INTO BOTH NOSTRILS TWICE A DAY 1/10/20  Yes Historical Provider, MD   famotidine (PEPCID) 40 MG tablet TAKE ONE TABLET BY MOUTH AT BEDTIME 11/29/19  Yes Historical Provider, MD   pantoprazole (PROTONIX) 40 MG tablet TAKE ONE TABLET BY MOUTH EVERY MORNING 11/29/19  Yes Historical Provider, MD   tolterodine (DETROL LA) 2 MG extended release capsule  10/22/19  Yes Historical Provider, MD   valsartan (DIOVAN) 80 MG tablet TAKE ONE TABLET BY MOUTH EVERY DAY 9/25/19  Yes Historical Provider, MD   ACCU-CHEK SOFTCLIX LANCETS [address]  6/6/19  Yes Historical Provider, MD   Lancets Misc. (ACCU-CHEK SOFTCLIX LANCET DEV) KIT  4/23/19  Yes Historical Provider, MD   montelukast (SINGULAIR) 10 MG tablet TAKE ONE TABLET BY MOUTH EVERY DAY 6/3/19  Yes Historical Provider, MD   fluticasone (FLONASE) 50 MCG/ACT nasal spray use 2 sprays in each nostril daily 3/21/19  Yes Historical Provider, MD   Loratadine-Pseudoephedrine (CLARITIN-D 12 HOUR PO) Take by mouth   Yes Historical Provider, MD   venlafaxine (EFFEXOR XR) 75 MG extended release capsule Take 1 capsule by mouth 2 times daily  7/29/18  Yes Historical Provider, MD   gabapentin (NEURONTIN) 300 MG capsule Take 300 mg by mouth 3 times daily Take am dos 01/12 .    Yes Historical Provider, MD   metFORMIN (GLUCOPHAGE-XR) 500 MG extended release tablet Take 500 mg by mouth 2 times daily   Yes Historical Provider, MD   pravastatin (PRAVACHOL) 20 MG tablet Take 20 mg by mouth nightly   Yes Historical Provider, MD   vitamin D (CHOLECALCIFEROL) 1000 UNIT TABS tablet Take 1,000 Units by mouth 2 times daily Ld 01/06   Yes Historical Provider, MD   Multiple Vitamin (MULTIVITAMIN PO) Take 1 tablet by mouth daily LD 01/06   Yes Historical Provider, MD   fish oil-omega-3 fatty acids 1000 MG capsule Take 2 g by mouth 2 times daily LD 01/06   Yes Historical Provider, MD   Calcium Carbonate-Vit D-Min (CALCIUM 1200 PO) Take 600 mg by mouth 2 times daily LD 01/06   Yes Historical Provider, MD        REVIEW OF SYSTEMS:     Phong Bucio denies fever/chills, chest pain, shortness of breath, new bowel or bladder complaints or suicidal ideations. All other review of systems was negative. PHYSICAL EXAMINATION:      /72   Pulse 75   Temp 98.3 °F (36.8 °C) (Oral)   Resp 16   Ht 5' 5\" (1.651 m)   Wt 239 lb (108.4 kg)   SpO2 94%   BMI 39.77 kg/m²      General:       General appearance: awake, alert, oriented   elderly, pleasant and well-hydrated. , in mild discomfort and A & O x3  Build:Overweight  Function:Rises from a seated position with difficulty     HEENT:     Head:normocephalic and atraumatic  Pupils:regular, round and equal.  Sclera: icterus absent,      Lungs:     Breathing:Breathing Pattern: regular, no distress     Abdomen:     Shape:obese and non-distended  Scars:yes  Tenderness:none  Guarding:none     Cervical spine:     Inspection:normal  Palpation:tenderness paravertebral muscles, facet loading, left, right and positive. Range of motion:abnormal moderately flexion, extension rotation bilateral and is  painful.     Lumbar spine:     Spine inspection:scoliosis   CVA tenderness:No   Palpation:tenderness paravertebral muscles, facet loading, right/left and positive.   Range of motion:abnormal moderately Lateral bending, flexion, extension rotation right and is  painful.     Musculoskeletal:     Trigger points in Paravet[place]:absent bilaterally  SI joint weight  Treatment plan discussed with the patient including medications side effects      ccreferring fernanda Gill M.D.

## 2020-03-30 NOTE — PROGRESS NOTES
[address]                               SLEEP STUDY REPORT    PATIENT NAME: Marion Singh                      :        1950  MED REC NO:   [id number]                            ROOM:  ACCOUNT NO:   [de-identified]                           ADMIT DATE: 02/10/2020  PROVIDER:     Sriram Nicholson MD    DATE OF STUDY:  02/10/2020    The patient underwent a home sleep study provided through the [place] [id number] on 02/10/2020. This is an over-read of the  interpretation report by Dr. Rehana Wong. I agree with the interpretation and recommendations. I would like   to add the following comments:    1. This home sleep study was consistent with mild obstructive sleep  apnea, with an AHI of 14. In light of the patient's symptoms and  comorbidities, it is appropriate to proceed with a full-night titration  study to determine optimal PAP therapy settings. 2.  The patient should be counseled against driving while drowsy.         Larry Santillan MD    D: 2020 15:04:38       T: 2020 22:21:21     MARCELLE/JULIAN_PROSPER  Job#: [id number]     Doc#: [id number]    CC:

## 2020-04-21 ENCOUNTER — TELEPHONE (OUTPATIENT)
Dept: PAIN MANAGEMENT | Age: 70
End: 2020-04-21

## 2020-04-28 ENCOUNTER — VIRTUAL VISIT (OUTPATIENT)
Dept: PAIN MANAGEMENT | Age: 70
End: 2020-04-28
Payer: MEDICARE

## 2020-04-28 PROCEDURE — 99213 OFFICE O/P EST LOW 20 MIN: CPT | Performed by: PAIN MEDICINE

## 2020-04-28 RX ORDER — HYDROCODONE BITARTRATE AND ACETAMINOPHEN 5; 325 MG/1; MG/1
1 TABLET ORAL 2 TIMES DAILY PRN
Qty: 60 TABLET | Refills: 0 | Status: SHIPPED | OUTPATIENT
Start: 2020-04-28 | End: 2020-05-28

## 2020-04-28 NOTE — PROGRESS NOTES
Sascha Sheppard was read the following message We want to confirm that, for purposes of billing, this is a virtual visit with your provider for which we will submit a claim for reimbursement with your insurance company. You will be responsible for any copays, coinsurance amounts or other amounts not covered by your insurance company. If you do not accept this, unfortunately we will not be able to schedule a virtual visit with the provider. Do you accept? Amanda Duque responded Yes .

## 2020-04-28 NOTE — PROGRESS NOTES
[address]  [address] [facility], [address] [place] [address] Sergio  [phone]    Tele health follow up Note      Ignacio Batch     Date of Visit:  2020    CC:  Tele health follow up   Chief Complaint   Patient presents with    Lower Back Pain     Consent:  Telehealth Visit due to Rick 19 pandemic  The patient and/or health care decision maker is aware that he/she may receive a bill for this tele-health service [employer] and telephone because of poor connection , depending on his insurance coverage, and has provided verbal consent to proceed: Yes  I have considered the risks of abuse, dependence, addiction and diversion. My patient is aware that they will need a follow-up visit (in-person or virtually) at the appropriate time indicated for continued medications. Further, my patient is aware that when this acute crisis has lifted, they will be expected to return for an in-person visit and all elements of standard local and hospital guidelines in order to continue this medication. Patient Location:Home   Physician Location: Home     HPI:  Follow up on her chronic neck/low back and Left knee pain managed  Change in quality of symptoms:no. Patient satisfaction with analgesia:Fair  Medication side effects:none   Recent diagnostic testing: none  Recent interventional procedures:none     She has been on anticoagulation medications to include ASA and has not been on herbal supplements.  She is diabetic.     Imagin/2019 XR Left Knee: Total knee arthroplasty is identified without patellar resurfacing. There is normal alignment.  No loosening noted.  Mild soft tissue   swelling and joint effusion is noted.      Cervical spine Xray 2015  Multilevel spondylosis     Lumbar spine CT   IMPRESSION: Multilevel disc bulging and facet joint arthropathy   with spondylosis resulting in varying degrees of foraminal and   spinal canal stenosis       Lumbar spine MRI 2018  1. Levoconvex curvature with multilevel lumbar spondylosis, as   described above, most pronounced at L4-L5, where there is a diffuse   disc bulge with facet hypertrophy resulting in severe central canal   narrowing. 2. Mild central canal narrowing at L2-L3 and L3-L4. 3. Mild to moderate right foraminal narrowing at L2-L3. 4. Mild bilateral foraminal narrowing at L4-L5. Mild right foraminal   narrowing at T12-L1 and L5-S1.      Left shoulder Xray 01/2020  IMPRESSION   1. Degenerative changes of the left AC joint.      Left elbow Xray 01/2020  1. There is no fracture or dislocation. 2. Degenerative joint disease.      Previous treatments: Physical Therapy, Epidural Steroid Injection and medications. .                                          Potential Aberrant Drug-Related Behavior: No     Urine Drug Screening:  Buccal 1/19 negative for all as expected  7/2019 Buccal consistent   12/2019 Buccal screen consistent for Hydrocodone      OARRS report:  04/2020 Consistent     Past Medical History: Reviewed    Past Surgical History: Reviewed     Home Medications: Reviewed    Allergies: Reviewed     Social History: Reviewed     REVIEW OF SYSTEMS:     Qi Banks denies fever/chills, chest pain, shortness of breath, new bowel or bladder complaints. All other review of systems was negative. PHYSICAL EXAMINATION:      General:       A & O x3    HEENT:    Head:normocephalic and atraumatic  Sclera: icterus absent,     Lungs:    Breathing:Breathing Pattern: regular, no distress    Lumbar spine:    Range of motion:abnormal moderately Lateral bending, flexion, extension rotation bilateral and is  painful.     Musculoskeletal:    SLR:not done right, not done left, sitting     Extremities:    Tremors:None bilaterally upper and lower  Intact:Yes    Neurological:     Motor:          No apparent weakness per patient       [address]    Dermatology:    Skin:no unusual rashes and no skin lesions    Impression:    Chronic neck/low back

## 2020-06-09 ENCOUNTER — OFFICE VISIT (OUTPATIENT)
Dept: PAIN MANAGEMENT | Age: 70
End: 2020-06-09
Payer: MEDICARE

## 2020-06-09 VITALS
DIASTOLIC BLOOD PRESSURE: 70 MMHG | HEART RATE: 78 BPM | SYSTOLIC BLOOD PRESSURE: 112 MMHG | TEMPERATURE: 98.8 F | RESPIRATION RATE: 16 BRPM

## 2020-06-09 PROCEDURE — 99213 OFFICE O/P EST LOW 20 MIN: CPT | Performed by: PAIN MEDICINE

## 2020-06-09 PROCEDURE — 99213 OFFICE O/P EST LOW 20 MIN: CPT

## 2020-06-09 RX ORDER — HYDROCODONE BITARTRATE AND ACETAMINOPHEN 5; 325 MG/1; MG/1
1 TABLET ORAL 2 TIMES DAILY PRN
Qty: 60 TABLET | Refills: 0 | Status: SHIPPED | OUTPATIENT
Start: 2020-06-09 | End: 2020-07-09

## 2020-06-09 NOTE — PROGRESS NOTES
[address]  [address] [facility], [address] [place] [address] Sergio  [phone]    Follow up Note    Kaitlin Bates     Date of Visit:  2020    CC:  Patient presents for follow up   Chief Complaint   Patient presents with    Follow-up    Lower Back Pain     mainly on right     HPI:    Follow up on her chronic neck/low back and Left knee pain managed  Change in quality of symptoms:no. Patient satisfaction with analgesia:Fair  Medication side effects:none   Recent diagnostic testing: none  Recent interventional procedures:none    She has been on anticoagulation medications to include ASA and has not been on herbal supplements. She is diabetic.     Imagin/2019 XR Left Knee: Total knee arthroplasty is identified without patellar resurfacing. There is normal alignment.  No loosening noted.  Mild soft tissue   swelling and joint effusion is noted. Cervical spine Xray 2015  Multilevel spondylosis    Lumbar spine CT   IMPRESSION: Multilevel disc bulging and facet joint arthropathy   with spondylosis resulting in varying degrees of foraminal and   spinal canal stenosis      Lumbar spine MRI 2018  1. Levoconvex curvature with multilevel lumbar spondylosis, as   described above, most pronounced at L4-L5, where there is a diffuse   disc bulge with facet hypertrophy resulting in severe central canal   narrowing. 2. Mild central canal narrowing at L2-L3 and L3-L4. 3. Mild to moderate right foraminal narrowing at L2-L3. 4. Mild bilateral foraminal narrowing at L4-L5. Mild right foraminal   narrowing at T12-L1 and L5-S1.      Left shoulder Xray 2020  IMPRESSION   1. Degenerative changes of the left AC joint. Left elbow Xray 2020  1. There is no fracture or dislocation. 2. Degenerative joint disease. Previous treatments: Physical Therapy, Epidural Steroid Injection and medications. .         Potential Aberrant Drug-Related Behavior: No    Urine Drug upper and lower  Range of motion:Generally normal shoulders, pain with internal rotation of hips negative. Intact:Yes  Edema:Normal     Neurological:     Sensory:normal to joint position sense and light touch  Motor:   Right Grip5/5              Left Grip5/5               Right Bicep5/5           Left Bicep5/5              Right Triceps5/5       Left Triceps5/5          Right Deltoid5/5     Left Deltoid5/5                  Right Quadriceps5/5          Left Quadriceps5/5           Right Gastrocnemius5/5    Left Gastrocnemius5/5  Right Ant Tibialis5/5  Left Ant Tibialis5/5  Reflexes:    Right Brachioradialis reflex2+  Left Brachioradialis reflex2+  Right Biceps reflex2+  Left Biceps reflex2+  Right Triceps reflex2+  Left Triceps reflex2+  Right Quadriceps reflex2+  Left Quadriceps reflexNOT DONE  Right Achilles reflex0  Left Achilles reflex0  Gait:antalgic     Dermatology:     Skin:no unusual rashes and no skin lesions    Assessment/Plan:  Chronic neck/low back and Left knee pain with h/o TKR. Patient Saw Dr. James Henry at [facility] and he  did not recommend any surgery for her lower back. Patient saw Dr. Ford Mchugh, recommended L2-L5 laminectomy  and fusion, patient remains apprehensive. Sees  for her knee pain. Failed interventional procedures for her low back pain, apprehensive about trying them for her neck pain. Plan    Follow up on her low back/neck and Left knee pain, no acute changes managed with current medications regimen. Continue with Norco 5/325 QD PRN, patient given 60 pills to last 6 weeks. Continue with Gabapentin 300 mg BID. OARRS report reviewed 06/2020. Patient encouraged to stay active and to lose weight. Treatment plan discussed with the patient including medications side effects. We discussed with the patient that combining opioids, benzodiazepines, alcohol, illicit drugs or sleep aids increases the risk of respiratory depression including death.  We discussed that these medications may

## 2020-07-21 ENCOUNTER — OFFICE VISIT (OUTPATIENT)
Dept: PAIN MANAGEMENT | Age: 70
End: 2020-07-21
Payer: MEDICARE

## 2020-07-21 VITALS
HEIGHT: 65 IN | TEMPERATURE: 98.9 F | HEART RATE: 64 BPM | WEIGHT: 239 LBS | DIASTOLIC BLOOD PRESSURE: 74 MMHG | RESPIRATION RATE: 16 BRPM | BODY MASS INDEX: 39.82 KG/M2 | SYSTOLIC BLOOD PRESSURE: 128 MMHG

## 2020-07-21 PROCEDURE — 99213 OFFICE O/P EST LOW 20 MIN: CPT | Performed by: NURSE PRACTITIONER

## 2020-07-21 RX ORDER — HYDROCODONE BITARTRATE AND ACETAMINOPHEN 7.5; 325 MG/1; MG/1
1 TABLET ORAL 2 TIMES DAILY
Qty: 60 TABLET | Refills: 0 | Status: SHIPPED
Start: 2020-07-21 | End: 2020-07-21 | Stop reason: CLARIF

## 2020-07-21 RX ORDER — HYDROCODONE BITARTRATE AND ACETAMINOPHEN 5; 325 MG/1; MG/1
1 TABLET ORAL 2 TIMES DAILY
Qty: 60 TABLET | Refills: 0 | Status: SHIPPED | OUTPATIENT
Start: 2020-07-21 | End: 2020-08-20

## 2020-07-21 RX ORDER — HYDROCODONE BITARTRATE AND ACETAMINOPHEN 7.5; 325 MG/1; MG/1
1 TABLET ORAL 2 TIMES DAILY
Qty: 60 TABLET | Refills: 0 | Status: SHIPPED
Start: 2020-07-21 | End: 2020-07-21

## 2020-07-21 NOTE — PROGRESS NOTES
[address] [place] [address], [address] [place] [id number]  [phone]    Follow up Note      Marylee Isaac     Date of Visit:  2020    CC:  Patient presents for follow up with left knee, low back and neck pain. HPI:     Follow up on her chronic neck/low back and Left knee pain managed, no acute changes  Change in quality of symptoms:no. Patient satisfaction with analgesia:Fair  Medication side effects:none   Recent diagnostic testing: none  Recent interventional procedures:none     She has been on anticoagulation medications to include ASA and has not been on herbal supplements.  She is diabetic.     Imagin/2019 XR Left Knee: Total knee arthroplasty is identified without patellar resurfacing. There is normal alignment.  No loosening noted.  Mild soft tissue   swelling and joint effusion is noted.      Cervical spine Xray 2015  Multilevel spondylosis     Lumbar spine CT   IMPRESSION: Multilevel disc bulging and facet joint arthropathy   with spondylosis resulting in varying degrees of foraminal and   spinal canal stenosis       Lumbar spine MRI 2018  1. Levoconvex curvature with multilevel lumbar spondylosis, as   described above, most pronounced at L4-L5, where there is a diffuse   disc bulge with facet hypertrophy resulting in severe central canal   narrowing. 2. Mild central canal narrowing at L2-L3 and L3-L4. 3. Mild to moderate right foraminal narrowing at L2-L3. 4. Mild bilateral foraminal narrowing at L4-L5. Mild right foraminal   narrowing at T12-L1 and L5-S1.      Left shoulder Xray 2020  IMPRESSION   1. Degenerative changes of the left AC joint.      Left elbow Xray 2020  1. There is no fracture or dislocation. 2. Degenerative joint disease.      Previous treatments: Physical Therapy, Epidural Steroid Injection and medications. .                                          Potential Aberrant Drug-Related Behavior: No     Urine Drug Screening:  Buccal  negative for all as expected  2019 Buccal consistent   2019 Buccal screen consistent for Hydrocodone      OARRS report:  2020 Consistent          Past Medical History:   Diagnosis Date    Arthritis     Asthma     no inhaler, controlled ; hist years ago of asthmatic bronchitis, denies problems since    Depression     Diabetes mellitus ([facility] [address]     History of blood transfusion     Hyperlipidemia     Hypertension     Right cataract 2018       Past Surgical History:   Procedure Laterality Date    ABDOMEN SURGERY      CATARACT REMOVAL WITH IMPLANT Right 2018     SECTION      COLONOSCOPY      EYE SURGERY      bilateral cataracts    FRACTURE SURGERY      left foot    JOINT REPLACEMENT      total left knee    KNEE ARTHROPLASTY Left 2013    totoal joint arthroplasty left knee with platelet gel and nerve block    NERVE BLOCK  7 11 12    caudal #1    NERVE BLOCK  7  12    caudal #2    NERVE BLOCK  12    therapeutic caudal with epiduragram    NERVE BLOCK  2018    lumbar epidural injection #1    NERVE BLOCK Right 2018    lumbar medial branch block  #1    POLYPECTOMY      removed from uterus    MO INJ DX/THER AGNT PARAVERT FACET JOINT, LUMBAR/SAC, 1ST LEVEL Right 2018    RIGHT LUMBAR L2,3,4,MEDIAL BRANCH BLOCK #1 performed by Sumanth Curry MD at [address] [place] [address] NOSE/CLEFT LIP/TIP N/A 2018    L3-4 LUMBAR EPIDURAL STEROID INJECTION #1 performed by Sumanth Curry MD at [facility]         Prior to Admission medications    Medication Sig Start Date End Date Taking?  Authorizing Provider   Aspirin Buf,CaCarb-MgCarb-MgO, 81 MG TABS Take by mouth    Historical Provider, MD   famotidine (PEPCID) 40 MG tablet TAKE ONE TABLET BY MOUTH AT BEDTIME 19   Historical Provider, MD   pantoprazole (PROTONIX) 40 MG tablet TAKE ONE TABLET BY MOUTH EVERY MORNING 19   Historical Provider, MD tolterodine (DETROL LA) 2 MG extended release capsule  10/22/19   Historical Provider, MD   valsartan (DIOVAN) 80 MG tablet TAKE ONE TABLET BY MOUTH EVERY DAY 9/25/19   Historical Provider, MD [facility]  6/6/19   Historical Provider, MD   Lancets Misc. (ACCUYENCHEBALWINDER SOFTCLIX LANCET DEV) KIT  4/23/19   Historical Provider, MD   montelukast (SINGULAIR) 10 MG tablet TAKE ONE TABLET BY MOUTH EVERY DAY 6/3/19   Historical Provider, MD   fluticasone (FLONASE) 50 MCG/ACT nasal spray use 2 sprays in each nostril daily 3/21/19   Historical Provider, MD   Loratadine-Pseudoephedrine (CLARITIN-D 12 HOUR PO) Take by mouth    Historical Provider, MD   venlafaxine (EFFEXOR XR) 75 MG extended release capsule Take 1 capsule by mouth 2 times daily  7/29/18   Historical Provider, MD   gabapentin (NEURONTIN) 300 MG capsule Take 300 mg by mouth 3 times daily Take am dos 01/12 .     Historical Provider, MD   metFORMIN (GLUCOPHAGE-XR) 500 MG extended release tablet Take 500 mg by mouth 2 times daily    Historical Provider, MD   pravastatin (PRAVACHOL) 20 MG tablet Take 20 mg by mouth nightly    Historical Provider, MD   vitamin D (CHOLECALCIFEROL) 1000 UNIT TABS tablet Take 1,000 Units by mouth 2 times daily Ld 01/06    Historical Provider, MD   Multiple Vitamin (MULTIVITAMIN PO) Take 1 tablet by mouth daily LD 01/06    Historical Provider, MD   fish oil-omega-3 fatty acids 1000 MG capsule Take 2 g by mouth 2 times daily LD 01/06    Historical Provider, MD   Calcium Carbonate-Vit D-Min (CALCIUM 1200 PO) Take 600 mg by mouth 2 times daily LD 01/06    Historical Provider, MD       Allergies   Allergen Reactions    Penicillins Rash       Social History     Socioeconomic History    Marital status:      Spouse name: Not on file    Number of children: Not on file    Years of education: Not on file    Highest education level: Not on file   Occupational History    Not on file   Social Needs    Financial for her low back pain, apprehensive about trying them for her neck pain. Plan    Follow up on her low back/neck and Left knee pain, no acute changes managed with current medications regimen. Talk with DR Adela Lee about gennicular knee blocks for left knee pain, [facility]  Continue with and refill [place] 5/325 QD PRN, patient given 60 pills to last 6 weeks. Continue with Gabapentin 300 mg BID, PCP   OARRS report reviewed 07/2020. Patient encouraged to stay active and to lose weight. Treatment plan discussed with the patient including medications side effects. We discussed with the patient that combining opioids, benzodiazepines, alcohol, illicit drugs or sleep aids increases the risk of respiratory depression including death. We discussed that these medications may cause drowsiness, sedation or dizziness and have counseled the patient not to drive or operate machinery. We have discussed that these medications will be prescribed only by one provider. We have discussed with the patient about age related risk factors and have thoroughly discussed the importance of taking these medications as prescribed. The patient verbalizes understanding.         Electronically signed by DEMETRA Wiggins CNP on 7/21/20 at 12:50 PM EDT

## 2020-07-21 NOTE — PROGRESS NOTES
Do you currently have any of the following:    Fever: No  Headache:  No  Cough: No  Shortness of breath: No  Exposed to anyone with these symptoms: No                                                                                                                Bita Grande presents to the [facility] on 7/21/2020. Leonardo Torres is complaining of pain in her low back. The pain is constant. The pain is described as aching, colicky, tender and nagging. Pain is rated on her best day at a 5, on her worst day at a 9, and on average at a 7 on the VAS scale. She took her last dose of Norco last night. Leonardo Torres does not have issues with constipation. Any procedures since your last visit: No  She is  on NSAIDS and  is not on anticoagulation medications to include ASA and is managed by family dr. Maria L Johnson or defibrilator: No.       /74   Pulse 64   Temp 98.9 °F (37.2 °C) (Oral)   Resp 16   Ht 5' 5\" (1.651 m)   Wt 239 lb (108.4 kg)   BMI 39.77 kg/m²      No LMP recorded.  Patient is postmenopausal.

## 2020-09-03 ENCOUNTER — HOSPITAL ENCOUNTER (OUTPATIENT)
Age: 70
Discharge: HOME | End: 2020-09-03
Payer: MEDICARE

## 2020-09-03 LAB
ALBUMIN SERPL-MCNC: 3.9 G/DL (ref 3.5–5.2)
ALP BLD-CCNC: 61 U/L (ref 35–104)
ALT SERPL-CCNC: 15 U/L (ref 0–32)
ANION GAP SERPL CALCULATED.3IONS-SCNC: 13 MMOL/L (ref 7–16)
AST SERPL-CCNC: 19 U/L (ref 0–31)
BASOPHILS ABSOLUTE: 0.02 E9/L (ref 0–0.2)
BASOPHILS RELATIVE PERCENT: 0.4 % (ref 0–2)
BILIRUB SERPL-MCNC: 0.3 MG/DL (ref 0–1.2)
BUN BLDV-MCNC: 18 MG/DL (ref 8–23)
CALCIUM SERPL-MCNC: 9.1 MG/DL (ref 8.6–10.2)
CHLORIDE BLD-SCNC: 105 MMOL/L (ref 98–107)
CHOLESTEROL, TOTAL: 140 MG/DL (ref 0–199)
CO2: 26 MMOL/L (ref 22–29)
CREAT SERPL-MCNC: 0.9 MG/DL (ref 0.5–1)
EOSINOPHILS ABSOLUTE: 0.17 E9/L (ref 0.05–0.5)
EOSINOPHILS RELATIVE PERCENT: 3 % (ref 0–6)
GFR AFRICAN AMERICAN: >60
GFR NON-AFRICAN AMERICAN: >60 ML/MIN/1.73
GLUCOSE BLD-MCNC: 138 MG/DL (ref 74–99)
HBA1C MFR BLD: 6.4 % (ref 4–5.6)
HCT VFR BLD CALC: 36.6 % (ref 34–48)
HDLC SERPL-MCNC: 55 MG/DL
HEMOGLOBIN: 11.1 G/DL (ref 11.5–15.5)
IMMATURE GRANULOCYTES #: 0.01 E9/L
IMMATURE GRANULOCYTES %: 0.2 % (ref 0–5)
LDL CHOLESTEROL CALCULATED: 62 MG/DL (ref 0–99)
LYMPHOCYTES ABSOLUTE: 1.44 E9/L (ref 1.5–4)
LYMPHOCYTES RELATIVE PERCENT: 25.5 % (ref 20–42)
MCH RBC QN AUTO: 25.9 PG (ref 26–35)
MCHC RBC AUTO-ENTMCNC: 30.3 % (ref 32–34.5)
MCV RBC AUTO: 85.5 FL (ref 80–99.9)
MONOCYTES ABSOLUTE: 0.45 E9/L (ref 0.1–0.95)
MONOCYTES RELATIVE PERCENT: 8 % (ref 2–12)
NEUTROPHILS ABSOLUTE: 3.56 E9/L (ref 1.8–7.3)
NEUTROPHILS RELATIVE PERCENT: 62.9 % (ref 43–80)
PDW BLD-RTO: 15.8 FL (ref 11.5–15)
PLATELET # BLD: 265 E9/L (ref 130–450)
PMV BLD AUTO: 11.3 FL (ref 7–12)
POTASSIUM SERPL-SCNC: 4.2 MMOL/L (ref 3.5–5)
RBC # BLD: 4.28 E12/L (ref 3.5–5.5)
SODIUM BLD-SCNC: 144 MMOL/L (ref 132–146)
T4 TOTAL: 6.8 MCG/DL (ref 4.5–11.7)
TOTAL PROTEIN: 6.8 G/DL (ref 6.4–8.3)
TRIGL SERPL-MCNC: 115 MG/DL (ref 0–149)
TSH SERPL DL<=0.05 MIU/L-ACNC: 2.16 UIU/ML (ref 0.27–4.2)
VITAMIN B-12: >2000 PG/ML (ref 211–946)
VITAMIN D 25-HYDROXY: 50 NG/ML (ref 30–100)
VLDLC SERPL CALC-MCNC: 23 MG/DL
WBC # BLD: 5.7 E9/L (ref 4.5–11.5)

## 2020-09-03 PROCEDURE — 80061 LIPID PANEL: CPT

## 2020-09-03 PROCEDURE — 36415 COLL VENOUS BLD VENIPUNCTURE: CPT

## 2020-09-03 PROCEDURE — 82607 VITAMIN B-12: CPT

## 2020-09-03 PROCEDURE — 84436 ASSAY OF TOTAL THYROXINE: CPT

## 2020-09-03 PROCEDURE — 83036 HEMOGLOBIN GLYCOSYLATED A1C: CPT

## 2020-09-03 PROCEDURE — 82306 VITAMIN D 25 HYDROXY: CPT

## 2020-09-03 PROCEDURE — 80053 COMPREHEN METABOLIC PANEL: CPT

## 2020-09-03 PROCEDURE — 84443 ASSAY THYROID STIM HORMONE: CPT

## 2020-09-03 PROCEDURE — 85025 COMPLETE CBC W/AUTO DIFF WBC: CPT

## 2020-09-10 ENCOUNTER — HOSPITAL ENCOUNTER (OUTPATIENT)
Age: 70
Discharge: HOME | End: 2020-09-12
Payer: MEDICARE

## 2020-09-10 LAB — MICROALBUMIN UR-MCNC: 20.8 MG/L

## 2020-09-10 PROCEDURE — 82044 UR ALBUMIN SEMIQUANTITATIVE: CPT

## 2020-11-04 ENCOUNTER — HOSPITAL ENCOUNTER (OUTPATIENT)
Age: 70
Discharge: HOME | End: 2020-11-04
Payer: MEDICARE

## 2020-11-04 LAB
ALBUMIN SERPL-MCNC: 4 G/DL (ref 3.5–5.2)
ALP BLD-CCNC: 62 U/L (ref 35–104)
ALT SERPL-CCNC: 14 U/L (ref 0–32)
ANION GAP SERPL CALCULATED.3IONS-SCNC: 11 MMOL/L (ref 7–16)
AST SERPL-CCNC: 20 U/L (ref 0–31)
BASOPHILS ABSOLUTE: 0.03 E9/L (ref 0–0.2)
BASOPHILS RELATIVE PERCENT: 0.6 % (ref 0–2)
BILIRUB SERPL-MCNC: 0.4 MG/DL (ref 0–1.2)
BUN BLDV-MCNC: 15 MG/DL (ref 8–23)
CALCIUM SERPL-MCNC: 9.1 MG/DL (ref 8.6–10.2)
CHLORIDE BLD-SCNC: 101 MMOL/L (ref 98–107)
CHOLESTEROL, FASTING: 149 MG/DL (ref 0–199)
CO2: 27 MMOL/L (ref 22–29)
CREAT SERPL-MCNC: 0.9 MG/DL (ref 0.5–1)
EOSINOPHILS ABSOLUTE: 0.12 E9/L (ref 0.05–0.5)
EOSINOPHILS RELATIVE PERCENT: 2.5 % (ref 0–6)
GFR AFRICAN AMERICAN: >60
GFR NON-AFRICAN AMERICAN: >60 ML/MIN/1.73
GLUCOSE FASTING: 132 MG/DL (ref 74–99)
HBA1C MFR BLD: 6.1 % (ref 4–5.6)
HCT VFR BLD CALC: 37.3 % (ref 34–48)
HDLC SERPL-MCNC: 57 MG/DL
HEMOGLOBIN: 11.2 G/DL (ref 11.5–15.5)
IMMATURE GRANULOCYTES #: 0.01 E9/L
IMMATURE GRANULOCYTES %: 0.2 % (ref 0–5)
LDL CHOLESTEROL CALCULATED: 72 MG/DL (ref 0–99)
LYMPHOCYTES ABSOLUTE: 1.4 E9/L (ref 1.5–4)
LYMPHOCYTES RELATIVE PERCENT: 29.6 % (ref 20–42)
MCH RBC QN AUTO: 25.7 PG (ref 26–35)
MCHC RBC AUTO-ENTMCNC: 30 % (ref 32–34.5)
MCV RBC AUTO: 85.6 FL (ref 80–99.9)
MONOCYTES ABSOLUTE: 0.4 E9/L (ref 0.1–0.95)
MONOCYTES RELATIVE PERCENT: 8.5 % (ref 2–12)
NEUTROPHILS ABSOLUTE: 2.77 E9/L (ref 1.8–7.3)
NEUTROPHILS RELATIVE PERCENT: 58.6 % (ref 43–80)
PDW BLD-RTO: 16 FL (ref 11.5–15)
PLATELET # BLD: 281 E9/L (ref 130–450)
PMV BLD AUTO: 11.4 FL (ref 7–12)
POTASSIUM SERPL-SCNC: 4.3 MMOL/L (ref 3.5–5)
RBC # BLD: 4.36 E12/L (ref 3.5–5.5)
SODIUM BLD-SCNC: 139 MMOL/L (ref 132–146)
T4 TOTAL: 6.4 MCG/DL (ref 4.5–11.7)
TOTAL PROTEIN: 7 G/DL (ref 6.4–8.3)
TRIGLYCERIDE, FASTING: 101 MG/DL (ref 0–149)
TSH SERPL DL<=0.05 MIU/L-ACNC: 2.09 UIU/ML (ref 0.27–4.2)
VITAMIN B-12: 1055 PG/ML (ref 211–946)
VITAMIN D 25-HYDROXY: 52 NG/ML (ref 30–100)
VLDLC SERPL CALC-MCNC: 20 MG/DL
WBC # BLD: 4.7 E9/L (ref 4.5–11.5)

## 2020-11-04 PROCEDURE — 36415 COLL VENOUS BLD VENIPUNCTURE: CPT

## 2020-11-04 PROCEDURE — 82306 VITAMIN D 25 HYDROXY: CPT

## 2020-11-04 PROCEDURE — 84436 ASSAY OF TOTAL THYROXINE: CPT

## 2020-11-04 PROCEDURE — 85025 COMPLETE CBC W/AUTO DIFF WBC: CPT

## 2020-11-04 PROCEDURE — 84443 ASSAY THYROID STIM HORMONE: CPT

## 2020-11-04 PROCEDURE — 80061 LIPID PANEL: CPT

## 2020-11-04 PROCEDURE — 80053 COMPREHEN METABOLIC PANEL: CPT

## 2020-11-04 PROCEDURE — 83036 HEMOGLOBIN GLYCOSYLATED A1C: CPT

## 2020-11-04 PROCEDURE — 82607 VITAMIN B-12: CPT

## 2021-02-03 ENCOUNTER — HOSPITAL ENCOUNTER (OUTPATIENT)
Dept: MAMMOGRAPHY | Age: 71
Discharge: HOME | End: 2021-02-05
Payer: MEDICARE

## 2021-02-03 DIAGNOSIS — Z12.31 VISIT FOR SCREENING MAMMOGRAM: ICD-10-CM

## 2021-02-03 PROCEDURE — 77063 BREAST TOMOSYNTHESIS BI: CPT

## 2021-12-06 ENCOUNTER — HOSPITAL ENCOUNTER (OUTPATIENT)
Dept: MRI IMAGING | Age: 71
Discharge: HOME | End: 2021-12-08
Payer: MEDICARE

## 2021-12-06 DIAGNOSIS — M25.361 RIGHT KNEE BUCKLING: ICD-10-CM

## 2021-12-06 DIAGNOSIS — M17.11 OSTEOARTHRITIS OF RIGHT KNEE, UNSPECIFIED OSTEOARTHRITIS TYPE: ICD-10-CM

## 2021-12-06 PROCEDURE — 73721 MRI JNT OF LWR EXTRE W/O DYE: CPT

## 2022-01-10 LAB
BACTERIA: ABNORMAL /HPF
BILIRUBIN URINE: NEGATIVE
BLOOD, URINE: ABNORMAL
CLARITY: CLEAR
COLOR: YELLOW
EPITHELIAL CELLS, UA: ABNORMAL /HPF
GLUCOSE URINE: NEGATIVE MG/DL
KETONES, URINE: NEGATIVE MG/DL
LEUKOCYTE ESTERASE, URINE: ABNORMAL
NITRITE, URINE: NEGATIVE
PH UA: 6.5 (ref 5–9)
PROTEIN UA: NEGATIVE MG/DL
RBC UA: ABNORMAL /HPF (ref 0–2)
SPECIFIC GRAVITY UA: 1.01 (ref 1–1.03)
UROBILINOGEN, URINE: 0.2 E.U./DL
WBC UA: ABNORMAL /HPF (ref 0–5)

## 2022-01-13 LAB — URINE CULTURE, ROUTINE: NORMAL

## 2022-02-09 ENCOUNTER — HOSPITAL ENCOUNTER (OUTPATIENT)
Dept: MAMMOGRAPHY | Age: 72
Discharge: HOME | End: 2022-02-11
Payer: MEDICARE

## 2022-02-09 VITALS — BODY MASS INDEX: 39.27 KG/M2 | HEIGHT: 64 IN | WEIGHT: 230 LBS

## 2022-02-09 DIAGNOSIS — Z12.31 ENCOUNTER FOR SCREENING MAMMOGRAM FOR MALIGNANT NEOPLASM OF BREAST: ICD-10-CM

## 2022-02-09 PROCEDURE — 77063 BREAST TOMOSYNTHESIS BI: CPT

## 2022-03-29 ENCOUNTER — HOSPITAL ENCOUNTER (OUTPATIENT)
Dept: MAMMOGRAPHY | Age: 72
Discharge: HOME | End: 2022-03-31
Payer: MEDICARE

## 2022-03-29 DIAGNOSIS — Z78.0 POST-MENOPAUSAL: ICD-10-CM

## 2022-03-29 DIAGNOSIS — Z78.0 MENOPAUSE: ICD-10-CM

## 2022-03-29 PROCEDURE — 77080 DXA BONE DENSITY AXIAL: CPT

## 2022-04-07 LAB
ALBUMIN SERPL-MCNC: 4.1 G/DL (ref 3.5–5.2)
ALP BLD-CCNC: 52 U/L (ref 35–104)
ALT SERPL-CCNC: 17 U/L (ref 0–32)
ANION GAP SERPL CALCULATED.3IONS-SCNC: 15 MMOL/L (ref 7–16)
AST SERPL-CCNC: 20 U/L (ref 0–31)
BASOPHILS ABSOLUTE: 0.04 E9/L (ref 0–0.2)
BASOPHILS RELATIVE PERCENT: 0.8 % (ref 0–2)
BILIRUB SERPL-MCNC: 0.3 MG/DL (ref 0–1.2)
BUN BLDV-MCNC: 17 MG/DL (ref 6–23)
CALCIUM SERPL-MCNC: 9.7 MG/DL (ref 8.6–10.2)
CHLORIDE BLD-SCNC: 99 MMOL/L (ref 98–107)
CHOLESTEROL, TOTAL: 158 MG/DL (ref 0–199)
CO2: 27 MMOL/L (ref 22–29)
CREAT SERPL-MCNC: 1 MG/DL (ref 0.5–1)
EOSINOPHILS ABSOLUTE: 0.06 E9/L (ref 0.05–0.5)
EOSINOPHILS RELATIVE PERCENT: 1.2 % (ref 0–6)
GFR AFRICAN AMERICAN: >60
GFR NON-AFRICAN AMERICAN: 54 ML/MIN/1.73
GLUCOSE BLD-MCNC: 126 MG/DL (ref 74–99)
HBA1C MFR BLD: 5.9 % (ref 4–5.6)
HCT VFR BLD CALC: 43.3 % (ref 34–48)
HDLC SERPL-MCNC: 65 MG/DL
HEMOGLOBIN: 13.7 G/DL (ref 11.5–15.5)
IMMATURE GRANULOCYTES #: 0.01 E9/L
IMMATURE GRANULOCYTES %: 0.2 % (ref 0–5)
IRON: 63 MCG/DL (ref 37–145)
LDL CHOLESTEROL CALCULATED: 77 MG/DL (ref 0–99)
LYMPHOCYTES ABSOLUTE: 1.57 E9/L (ref 1.5–4)
LYMPHOCYTES RELATIVE PERCENT: 31.6 % (ref 20–42)
MCH RBC QN AUTO: 30.2 PG (ref 26–35)
MCHC RBC AUTO-ENTMCNC: 31.6 % (ref 32–34.5)
MCV RBC AUTO: 95.6 FL (ref 80–99.9)
MONOCYTES ABSOLUTE: 0.44 E9/L (ref 0.1–0.95)
MONOCYTES RELATIVE PERCENT: 8.9 % (ref 2–12)
NEUTROPHILS ABSOLUTE: 2.85 E9/L (ref 1.8–7.3)
NEUTROPHILS RELATIVE PERCENT: 57.3 % (ref 43–80)
PDW BLD-RTO: 14.5 FL (ref 11.5–15)
PLATELET # BLD: 271 E9/L (ref 130–450)
PMV BLD AUTO: 12.4 FL (ref 7–12)
POTASSIUM SERPL-SCNC: 3.5 MMOL/L (ref 3.5–5)
RBC # BLD: 4.53 E12/L (ref 3.5–5.5)
SODIUM BLD-SCNC: 141 MMOL/L (ref 132–146)
T4 TOTAL: 6.1 MCG/DL (ref 4.5–11.7)
TOTAL PROTEIN: 7 G/DL (ref 6.4–8.3)
TRIGL SERPL-MCNC: 82 MG/DL (ref 0–149)
TSH SERPL DL<=0.05 MIU/L-ACNC: 2.15 UIU/ML (ref 0.27–4.2)
VITAMIN B-12: 410 PG/ML (ref 211–946)
VLDLC SERPL CALC-MCNC: 16 MG/DL
WBC # BLD: 5 E9/L (ref 4.5–11.5)

## 2022-05-04 ENCOUNTER — HOSPITAL ENCOUNTER (OUTPATIENT)
Dept: CT IMAGING | Age: 72
Discharge: HOME | End: 2022-05-04
Payer: MEDICARE

## 2022-05-04 DIAGNOSIS — R31.0 GROSS HEMATURIA: ICD-10-CM

## 2022-05-04 PROCEDURE — 74178 CT ABD&PLV WO CNTR FLWD CNTR: CPT

## 2022-05-04 PROCEDURE — 6360000004 HC RX CONTRAST MEDICATION: Performed by: RADIOLOGY

## 2022-05-04 RX ADMIN — IOPAMIDOL 120 ML: 755 INJECTION, SOLUTION INTRAVENOUS at 15:16

## 2022-05-06 ENCOUNTER — HOSPITAL ENCOUNTER (OUTPATIENT)
Age: 72
Discharge: HOME | End: 2022-05-06
Payer: MEDICARE

## 2022-05-06 LAB
BUN BLDV-MCNC: 16 MG/DL (ref 6–23)
CREAT SERPL-MCNC: 1 MG/DL (ref 0.5–1)
GFR AFRICAN AMERICAN: >60
GFR NON-AFRICAN AMERICAN: 54 ML/MIN/1.73

## 2022-05-06 PROCEDURE — 36415 COLL VENOUS BLD VENIPUNCTURE: CPT

## 2022-05-06 PROCEDURE — 82565 ASSAY OF CREATININE: CPT

## 2022-05-06 PROCEDURE — 84520 ASSAY OF UREA NITROGEN: CPT

## 2022-09-08 RX ORDER — GABAPENTIN 300 MG/1
300 CAPSULE ORAL 3 TIMES DAILY
Qty: 90 CAPSULE | Refills: 3 | Status: SHIPPED
Start: 2022-09-08 | End: 2022-09-20 | Stop reason: SDUPTHER

## 2022-09-08 RX ORDER — METFORMIN HYDROCHLORIDE 500 MG/1
500 TABLET, EXTENDED RELEASE ORAL 2 TIMES DAILY
Qty: 30 TABLET | Refills: 3 | Status: CANCELLED | OUTPATIENT
Start: 2022-09-08

## 2022-09-09 RX ORDER — METFORMIN HYDROCHLORIDE 500 MG/1
500 TABLET, EXTENDED RELEASE ORAL 2 TIMES DAILY
Qty: 180 TABLET | Refills: 1 | Status: SHIPPED | OUTPATIENT
Start: 2022-09-09

## 2022-09-12 RX ORDER — VENLAFAXINE HYDROCHLORIDE 75 MG/1
75 CAPSULE, EXTENDED RELEASE ORAL DAILY
Qty: 90 CAPSULE | Refills: 1 | Status: SHIPPED
Start: 2022-09-12 | End: 2022-10-10 | Stop reason: SDUPTHER

## 2022-09-12 RX ORDER — PRAVASTATIN SODIUM 20 MG
20 TABLET ORAL NIGHTLY
Qty: 90 TABLET | Refills: 1 | Status: SHIPPED | OUTPATIENT
Start: 2022-09-12

## 2022-09-12 NOTE — TELEPHONE ENCOUNTER
Patient called for Venlafaxine refill. Dose confirmed with patient. Medication pended.   Electronically signed by Krista Gruber MA on 9/12/2022 at 11:46 AM

## 2022-09-20 ENCOUNTER — TELEPHONE (OUTPATIENT)
Dept: INTERNAL MEDICINE CLINIC | Age: 72
End: 2022-09-20

## 2022-09-20 RX ORDER — GABAPENTIN 300 MG/1
300 CAPSULE ORAL 3 TIMES DAILY
Qty: 270 CAPSULE | Refills: 1 | Status: SHIPPED | OUTPATIENT
Start: 2022-09-20 | End: 2022-12-19

## 2022-09-20 NOTE — TELEPHONE ENCOUNTER
Pt called in and said [facility] has been trying to call us for an Dennise Christianson. for her Gabapentin and we haven't responded.

## 2022-10-10 RX ORDER — VENLAFAXINE HYDROCHLORIDE 75 MG/1
75 CAPSULE, EXTENDED RELEASE ORAL DAILY
Qty: 90 CAPSULE | Refills: 1 | Status: SHIPPED
Start: 2022-10-10 | End: 2022-10-11 | Stop reason: SDUPTHER

## 2022-10-10 NOTE — TELEPHONE ENCOUNTER
Pt says she spoke with you over the weekend regarding med ---asking for a refill of effexor to be sent to [facility]

## 2022-10-11 RX ORDER — VENLAFAXINE HYDROCHLORIDE 75 MG/1
75 CAPSULE, EXTENDED RELEASE ORAL DAILY
Qty: 90 CAPSULE | Refills: 1 | Status: SHIPPED | OUTPATIENT
Start: 2022-10-11

## 2022-10-12 RX ORDER — VALSARTAN AND HYDROCHLOROTHIAZIDE 160; 25 MG/1; MG/1
1 TABLET ORAL DAILY
Qty: 30 TABLET | Refills: 3 | Status: SHIPPED | OUTPATIENT
Start: 2022-10-12 | End: 2023-10-12

## 2022-10-12 RX ORDER — VALSARTAN 80 MG/1
TABLET ORAL
Qty: 30 TABLET | Refills: 3 | Status: CANCELLED | OUTPATIENT
Start: 2022-10-12

## 2022-10-25 ENCOUNTER — OFFICE VISIT (OUTPATIENT)
Dept: INTERNAL MEDICINE CLINIC | Age: 72
End: 2022-10-25
Payer: MEDICARE

## 2022-10-25 VITALS
DIASTOLIC BLOOD PRESSURE: 84 MMHG | SYSTOLIC BLOOD PRESSURE: 112 MMHG | TEMPERATURE: 96.9 F | HEART RATE: 73 BPM | OXYGEN SATURATION: 96 % | WEIGHT: 203 LBS | BODY MASS INDEX: 34.66 KG/M2 | HEIGHT: 64 IN

## 2022-10-25 DIAGNOSIS — I10 HTN (HYPERTENSION), BENIGN: Chronic | ICD-10-CM

## 2022-10-25 DIAGNOSIS — R42 DIZZINESS: ICD-10-CM

## 2022-10-25 DIAGNOSIS — E11.9 TYPE 2 DIABETES MELLITUS WITHOUT COMPLICATION, WITHOUT LONG-TERM CURRENT USE OF INSULIN (HCC): Primary | ICD-10-CM

## 2022-10-25 DIAGNOSIS — M54.16 LUMBAR RADICULOPATHY: Chronic | ICD-10-CM

## 2022-10-25 DIAGNOSIS — M48.062 SPINAL STENOSIS OF LUMBAR REGION WITH NEUROGENIC CLAUDICATION: ICD-10-CM

## 2022-10-25 DIAGNOSIS — F41.9 ANXIETY: ICD-10-CM

## 2022-10-25 DIAGNOSIS — M79.672 LEFT FOOT PAIN: ICD-10-CM

## 2022-10-25 DIAGNOSIS — E78.2 MIXED HYPERLIPIDEMIA: ICD-10-CM

## 2022-10-25 PROCEDURE — 3074F SYST BP LT 130 MM HG: CPT | Performed by: INTERNAL MEDICINE

## 2022-10-25 PROCEDURE — 1123F ACP DISCUSS/DSCN MKR DOCD: CPT | Performed by: INTERNAL MEDICINE

## 2022-10-25 PROCEDURE — 99215 OFFICE O/P EST HI 40 MIN: CPT | Performed by: INTERNAL MEDICINE

## 2022-10-25 PROCEDURE — 3078F DIAST BP <80 MM HG: CPT | Performed by: INTERNAL MEDICINE

## 2022-10-25 PROCEDURE — 3044F HG A1C LEVEL LT 7.0%: CPT | Performed by: INTERNAL MEDICINE

## 2022-10-25 RX ORDER — PREDNISOLONE ACETATE 10 MG/ML
SUSPENSION/ DROPS OPHTHALMIC
COMMUNITY
Start: 2022-10-20

## 2022-10-25 RX ORDER — LORATADINE 10 MG/1
10 TABLET ORAL DAILY
Qty: 30 TABLET | Refills: 0
Start: 2022-10-25

## 2022-10-25 RX ORDER — FLASH GLUCOSE SENSOR
KIT MISCELLANEOUS
COMMUNITY
Start: 2022-09-15

## 2022-10-25 RX ORDER — AMLODIPINE BESYLATE 10 MG/1
5 TABLET ORAL DAILY
COMMUNITY
Start: 2022-08-27

## 2022-10-25 RX ORDER — ESTRADIOL 0.1 MG/G
CREAM VAGINAL
COMMUNITY
Start: 2022-09-12

## 2022-10-25 SDOH — ECONOMIC STABILITY: FOOD INSECURITY: WITHIN THE PAST 12 MONTHS, THE FOOD YOU BOUGHT JUST DIDN'T LAST AND YOU DIDN'T HAVE MONEY TO GET MORE.: NEVER TRUE

## 2022-10-25 SDOH — ECONOMIC STABILITY: FOOD INSECURITY: WITHIN THE PAST 12 MONTHS, YOU WORRIED THAT YOUR FOOD WOULD RUN OUT BEFORE YOU GOT MONEY TO BUY MORE.: NEVER TRUE

## 2022-10-25 ASSESSMENT — ENCOUNTER SYMPTOMS
CHEST TIGHTNESS: 0
FACIAL SWELLING: 0
ABDOMINAL DISTENTION: 0
RHINORRHEA: 0
BACK PAIN: 1
COUGH: 0
SINUS PAIN: 0
SHORTNESS OF BREATH: 0
CONSTIPATION: 0
ABDOMINAL PAIN: 0
DIARRHEA: 0
BLOOD IN STOOL: 0
WHEEZING: 0
NAUSEA: 0

## 2022-10-25 ASSESSMENT — PATIENT HEALTH QUESTIONNAIRE - PHQ9
SUM OF ALL RESPONSES TO PHQ9 QUESTIONS 1 & 2: 0
2. FEELING DOWN, DEPRESSED OR HOPELESS: 0
SUM OF ALL RESPONSES TO PHQ QUESTIONS 1-9: 0
1. LITTLE INTEREST OR PLEASURE IN DOING THINGS: 0

## 2022-10-25 ASSESSMENT — SOCIAL DETERMINANTS OF HEALTH (SDOH): HOW HARD IS IT FOR YOU TO PAY FOR THE VERY BASICS LIKE FOOD, HOUSING, MEDICAL CARE, AND HEATING?: NOT HARD AT ALL

## 2022-10-25 NOTE — PROGRESS NOTES
Hailey Kinney (:  1950) is a 74 y.o. female,Established patient, here for evaluation of the following chief complaint(s):  Check-Up         ASSESSMENT/PLAN:  1. Type 2 diabetes mellitus without complication, without long-term current use of insulin (HCC)  -     Vitamin B12 & Folate; Future  -     Microalbumin / Creatinine Urine Ratio; Future  -     Hemoglobin A1C; Future  -     Lipid, Fasting; Future  -     T4, Free; Future  -     TSH; Future  -     Comprehensive Metabolic Panel; Future  -     CBC with Auto Differential; Future  Continue metformin patient states that her sugars been controlled well  Will check her A1c  2. HTN (hypertension), benign  -     Vitamin B12 & Folate; Future  -     Microalbumin / Creatinine Urine Ratio; Future  -     Hemoglobin A1C; Future  -     Lipid, Fasting; Future  -     T4, Free; Future  -     TSH; Future  -     Comprehensive Metabolic Panel; Future  -     CBC with Auto Differential; Future  Blood pressure optimal  actually might be a little on the low side for her, will check orthostatic blood pressure  Decrease Norvasc to 5 mg daily    3. Lumbar radiculopathy  -     loratadine (CLARITIN) 10 MG tablet; Take 1 tablet by mouth daily, Disp-30 tablet, R-0Adjust Sig  -     Misc. Devices (WALKER AUTO GLIDES) MISC; Disp-1 each, R-0, NormalStand up walker with wheels  -     Tens Unit MISC; Starting Tue 10/25/2022, Disp-1 each, R-0, Print[employer]ynex TENS unit  Lumbar radiculopathy of the right thigh pain and discussed with patient about therapy and pain management she stated she did before therapy and they did not help  Aquatic therapy helped some  And the injections that did not help her and she refuses surgery we will try a TENS unit  4. Anxiety  And continue Effexor  5. Dizziness  -     VL DUP CAROTID BILATERAL; Future  -     Vitamin B12 & Folate; Future  -     Microalbumin / Creatinine Urine Ratio; Future  -     Hemoglobin A1C; Future  -     Lipid, Fasting;  Future  -     T4, Free; Future  -     TSH; Future  -     Comprehensive Metabolic Panel; Future  -     CBC with Auto Differential; Future  And dizziness and possible syncopal episode about a month ago she stated she was walking to the kitchen and she felt like she is getting very dizzy and fell and she thinks she passed out we will check orthostatic blood pressure check carotid Doppler ultrasound. Get up slowly and stay well-hydrated    6. Left foot pain  -     External Referral To Podiatry  Patient has like a callus keeps getting removed and comes back and has been bothering her and painful  7. Spinal stenosis of lumbar region with neurogenic claudication  -     Tens Unit MISC; Starting Tue 10/25/2022, Disp-1 each, R-0, Print[employer]nex TENS unit  -     Vitamin B12 & Folate; Future  -     Microalbumin / Creatinine Urine Ratio; Future  -     Hemoglobin A1C; Future  -     Lipid, Fasting; Future  -     T4, Free; Future  -     TSH; Future  -     Comprehensive Metabolic Panel; Future  -     CBC with Auto Differential; Future  As above discussed  8. Mixed hyperlipidemia  -     Vitamin B12 & Folate; Future  -     Microalbumin / Creatinine Urine Ratio; Future  -     Hemoglobin A1C; Future  -     Lipid, Fasting; Future  -     T4, Free; Future  -     TSH; Future  -     Comprehensive Metabolic Panel; Future  -     CBC with Auto Differential; Future  Continue Pravachol and check her lipid profile  Return in about 2 months (around 12/25/2022). Subjective   SUBJECTIVE/OBJECTIVE:  Patient stated that she is anxious at times. Has been having some trouble with sleeping. Has low back pain radiating radiating to the right thigh  She gets dizzy when she gets up she has to be very careful getting up and she fell and thought she lost consciousness about a month ago      Review of Systems   Constitutional:  Negative for appetite change, chills, fatigue, fever and unexpected weight change.    HENT:  Negative for congestion, facial swelling, mouth sores, rhinorrhea and sinus pain. Eyes:  Negative for visual disturbance. Respiratory:  Negative for cough, chest tightness, shortness of breath and wheezing. Cardiovascular:  Negative for chest pain and leg swelling. Gastrointestinal:  Negative for abdominal distention, abdominal pain, blood in stool, constipation, diarrhea and nausea. Genitourinary:  Negative for difficulty urinating, dysuria, frequency and urgency. Musculoskeletal:  Positive for back pain. Negative for joint swelling. Low back pain and radiating to  the right thigh down to the knee   Skin:  Negative for rash. Neurological:  Positive for dizziness. Negative for syncope, weakness, light-headedness and headaches. Dizziness on standing and ? Syncope 1 month ago and fell on the ground    Psychiatric/Behavioral:  Negative for behavioral problems, confusion and hallucinations. The patient is nervous/anxious.          Insomnia        Objective   /84   Pulse 73   Temp 96.9 °F (36.1 °C) (Temporal)   Ht 5' 4\" (1.626 m)   Wt 203 lb (92.1 kg)   SpO2 96%   BMI 34.84 kg/m²   CBC with Differential:    Lab Results   Component Value Date/Time    WBC 5.0 04/07/2022 09:17 AM    RBC 4.53 04/07/2022 09:17 AM    HGB 13.7 04/07/2022 09:17 AM    HCT 43.3 04/07/2022 09:17 AM     04/07/2022 09:17 AM    MCV 95.6 04/07/2022 09:17 AM    MCH 30.2 04/07/2022 09:17 AM    MCHC 31.6 04/07/2022 09:17 AM    RDW 14.5 04/07/2022 09:17 AM    SEGSPCT 62 12/02/2013 09:25 AM    LYMPHOPCT 31.6 04/07/2022 09:17 AM    MONOPCT 8.9 04/07/2022 09:17 AM    BASOPCT 0.8 04/07/2022 09:17 AM    MONOSABS 0.44 04/07/2022 09:17 AM    LYMPHSABS 1.57 04/07/2022 09:17 AM    EOSABS 0.06 04/07/2022 09:17 AM    BASOSABS 0.04 04/07/2022 09:17 AM     CMP:    Lab Results   Component Value Date/Time     04/07/2022 09:17 AM    K 3.5 04/07/2022 09:17 AM    CL 99 04/07/2022 09:17 AM    CO2 27 04/07/2022 09:17 AM    BUN 16 05/06/2022 09:57 AM    CREATININE 1.0 05/06/2022 09:57 AM    GFRAA >60 05/06/2022 09:57 AM    LABGLOM 54 05/06/2022 09:57 AM    GLUCOSE 126 04/07/2022 09:17 AM    GLUCOSE 107 02/04/2011 07:00 AM    PROT 7.0 04/07/2022 09:17 AM    LABALBU 4.1 04/07/2022 09:17 AM    LABALBU 4.0 02/04/2011 07:00 AM    CALCIUM 9.7 04/07/2022 09:17 AM    BILITOT 0.3 04/07/2022 09:17 AM    ALKPHOS 52 04/07/2022 09:17 AM    AST 20 04/07/2022 09:17 AM    ALT 17 04/07/2022 09:17 AM     HgBA1c:    Lab Results   Component Value Date/Time    LABA1C 5.9 04/07/2022 09:17 AM     Lipid:   Lab Results   Component Value Date    CHOL 158 04/07/2022    CHOL 160 08/17/2021    CHOL 140 09/03/2020     Lab Results   Component Value Date    TRIG 82 04/07/2022    TRIG 100 08/17/2021    TRIG 115 09/03/2020     Lab Results   Component Value Date    HDL 65 04/07/2022    HDL 59 08/17/2021    HDL 57 11/04/2020     Lab Results   Component Value Date    LDLCALC 77 04/07/2022    LDLCALC 81 08/17/2021    LDLCALC 72 11/04/2020     Lab Results   Component Value Date    LABVLDL 16 04/07/2022    LABVLDL 20 08/17/2021    LABVLDL 20 11/04/2020     No results found for: CHOLHDLRATIO  TSH:    Lab Results   Component Value Date/Time    TSH 2.150 04/07/2022 09:17 AM     Free T3: No results found for: T3FREE  Free T4: No results found for: T4FREE  Physical Exam  Constitutional:       Appearance: Normal appearance. HENT:      Head: Normocephalic and atraumatic. Mouth/Throat:      Pharynx: Oropharynx is clear. Eyes:      Extraocular Movements: Extraocular movements intact. Pupils: Pupils are equal, round, and reactive to light. Cardiovascular:      Rate and Rhythm: Normal rate and regular rhythm. Pulses: Normal pulses. Heart sounds: Normal heart sounds. No murmur heard. Pulmonary:      Effort: Pulmonary effort is normal.      Breath sounds: Normal breath sounds. Abdominal:      General: There is no distension. Palpations: Abdomen is soft. There is no mass. Tenderness:  There is no abdominal tenderness. There is no guarding or rebound. Musculoskeletal:         General: No swelling. Cervical back: Normal range of motion and neck supple. Right lower leg: No edema. Left lower leg: No edema. Skin:     General: Skin is warm. Neurological:      General: No focal deficit present. Mental Status: She is alert and oriented to person, place, and time. Mental status is at baseline. An electronic signature was used to authenticate this note.     --Macy Osorio MD

## 2022-11-01 ENCOUNTER — HOSPITAL ENCOUNTER (OUTPATIENT)
Dept: INTERVENTIONAL RADIOLOGY/VASCULAR | Age: 72
Discharge: HOME | End: 2022-11-03
Payer: MEDICARE

## 2022-11-01 DIAGNOSIS — R42 DIZZINESS: ICD-10-CM

## 2022-11-01 PROCEDURE — 93880 EXTRACRANIAL BILAT STUDY: CPT

## 2022-12-29 DIAGNOSIS — R42 DIZZINESS: ICD-10-CM

## 2022-12-29 DIAGNOSIS — M48.062 SPINAL STENOSIS OF LUMBAR REGION WITH NEUROGENIC CLAUDICATION: ICD-10-CM

## 2022-12-29 DIAGNOSIS — E11.9 TYPE 2 DIABETES MELLITUS WITHOUT COMPLICATION, WITHOUT LONG-TERM CURRENT USE OF INSULIN (HCC): ICD-10-CM

## 2022-12-29 DIAGNOSIS — I10 HTN (HYPERTENSION), BENIGN: Chronic | ICD-10-CM

## 2022-12-29 DIAGNOSIS — E78.2 MIXED HYPERLIPIDEMIA: ICD-10-CM

## 2022-12-29 LAB
ALBUMIN SERPL-MCNC: 3.9 G/DL (ref 3.5–5.2)
ALP BLD-CCNC: 55 U/L (ref 35–104)
ALT SERPL-CCNC: 17 U/L (ref 0–32)
ANION GAP SERPL CALCULATED.3IONS-SCNC: 13 MMOL/L (ref 7–16)
AST SERPL-CCNC: 22 U/L (ref 0–31)
BASOPHILS ABSOLUTE: 0.03 E9/L (ref 0–0.2)
BASOPHILS RELATIVE PERCENT: 0.6 % (ref 0–2)
BILIRUB SERPL-MCNC: 0.4 MG/DL (ref 0–1.2)
BUN BLDV-MCNC: 22 MG/DL (ref 6–23)
CALCIUM SERPL-MCNC: 9.5 MG/DL (ref 8.6–10.2)
CHLORIDE BLD-SCNC: 102 MMOL/L (ref 98–107)
CHOLESTEROL, FASTING: 168 MG/DL (ref 0–199)
CO2: 29 MMOL/L (ref 22–29)
CREAT SERPL-MCNC: 1 MG/DL (ref 0.5–1)
CREATININE URINE: 77 MG/DL (ref 29–226)
EOSINOPHILS ABSOLUTE: 0.11 E9/L (ref 0.05–0.5)
EOSINOPHILS RELATIVE PERCENT: 2.3 % (ref 0–6)
FOLATE: >20 NG/ML (ref 4.8–24.2)
GFR SERPL CREATININE-BSD FRML MDRD: 59 ML/MIN/1.73
GLUCOSE BLD-MCNC: 112 MG/DL (ref 74–99)
HBA1C MFR BLD: 5.7 % (ref 4–5.6)
HCT VFR BLD CALC: 43.2 % (ref 34–48)
HDLC SERPL-MCNC: 58 MG/DL
HEMOGLOBIN: 14.3 G/DL (ref 11.5–15.5)
IMMATURE GRANULOCYTES #: 0.01 E9/L
IMMATURE GRANULOCYTES %: 0.2 % (ref 0–5)
LDL CHOLESTEROL CALCULATED: 85 MG/DL (ref 0–99)
LYMPHOCYTES ABSOLUTE: 1.63 E9/L (ref 1.5–4)
LYMPHOCYTES RELATIVE PERCENT: 34.4 % (ref 20–42)
MCH RBC QN AUTO: 31.1 PG (ref 26–35)
MCHC RBC AUTO-ENTMCNC: 33.1 % (ref 32–34.5)
MCV RBC AUTO: 93.9 FL (ref 80–99.9)
MICROALBUMIN UR-MCNC: <12 MG/L
MICROALBUMIN/CREAT UR-RTO: ABNORMAL (ref 0–30)
MONOCYTES ABSOLUTE: 0.41 E9/L (ref 0.1–0.95)
MONOCYTES RELATIVE PERCENT: 8.6 % (ref 2–12)
NEUTROPHILS ABSOLUTE: 2.55 E9/L (ref 1.8–7.3)
NEUTROPHILS RELATIVE PERCENT: 53.9 % (ref 43–80)
PDW BLD-RTO: 13.2 FL (ref 11.5–15)
PLATELET # BLD: 271 E9/L (ref 130–450)
PMV BLD AUTO: 11.6 FL (ref 7–12)
POTASSIUM SERPL-SCNC: 4.2 MMOL/L (ref 3.5–5)
RBC # BLD: 4.6 E12/L (ref 3.5–5.5)
SODIUM BLD-SCNC: 144 MMOL/L (ref 132–146)
T4 FREE: 1.04 NG/DL (ref 0.93–1.7)
TOTAL PROTEIN: 7 G/DL (ref 6.4–8.3)
TRIGLYCERIDE, FASTING: 123 MG/DL (ref 0–149)
TSH SERPL DL<=0.05 MIU/L-ACNC: 3.36 UIU/ML (ref 0.27–4.2)
VITAMIN B-12: 444 PG/ML (ref 211–946)
VLDLC SERPL CALC-MCNC: 25 MG/DL
WBC # BLD: 4.7 E9/L (ref 4.5–11.5)

## 2023-01-03 ENCOUNTER — OFFICE VISIT (OUTPATIENT)
Dept: INTERNAL MEDICINE CLINIC | Age: 73
End: 2023-01-03
Payer: MEDICARE

## 2023-01-03 VITALS
DIASTOLIC BLOOD PRESSURE: 62 MMHG | HEART RATE: 91 BPM | OXYGEN SATURATION: 95 % | RESPIRATION RATE: 18 BRPM | SYSTOLIC BLOOD PRESSURE: 114 MMHG | WEIGHT: 199 LBS | HEIGHT: 64 IN | TEMPERATURE: 97.5 F | BODY MASS INDEX: 33.97 KG/M2

## 2023-01-03 DIAGNOSIS — E11.9 TYPE 2 DIABETES MELLITUS WITHOUT COMPLICATION, WITHOUT LONG-TERM CURRENT USE OF INSULIN (HCC): Primary | ICD-10-CM

## 2023-01-03 DIAGNOSIS — E78.2 MIXED HYPERLIPIDEMIA: ICD-10-CM

## 2023-01-03 DIAGNOSIS — Z12.31 ENCOUNTER FOR SCREENING MAMMOGRAM FOR MALIGNANT NEOPLASM OF BREAST: ICD-10-CM

## 2023-01-03 DIAGNOSIS — I10 HTN (HYPERTENSION), BENIGN: Chronic | ICD-10-CM

## 2023-01-03 DIAGNOSIS — M48.062 SPINAL STENOSIS OF LUMBAR REGION WITH NEUROGENIC CLAUDICATION: ICD-10-CM

## 2023-01-03 DIAGNOSIS — F41.9 ANXIETY: ICD-10-CM

## 2023-01-03 DIAGNOSIS — G47.33 OSA (OBSTRUCTIVE SLEEP APNEA): ICD-10-CM

## 2023-01-03 PROBLEM — E78.5 HYPERLIPIDEMIA: Status: ACTIVE | Noted: 2023-01-03

## 2023-01-03 PROCEDURE — 3074F SYST BP LT 130 MM HG: CPT | Performed by: INTERNAL MEDICINE

## 2023-01-03 PROCEDURE — 3078F DIAST BP <80 MM HG: CPT | Performed by: INTERNAL MEDICINE

## 2023-01-03 PROCEDURE — 99214 OFFICE O/P EST MOD 30 MIN: CPT | Performed by: INTERNAL MEDICINE

## 2023-01-03 PROCEDURE — 1123F ACP DISCUSS/DSCN MKR DOCD: CPT | Performed by: INTERNAL MEDICINE

## 2023-01-03 RX ORDER — MONTELUKAST SODIUM 10 MG/1
10 TABLET ORAL DAILY
Qty: 90 TABLET | Refills: 1 | Status: SHIPPED | OUTPATIENT
Start: 2023-01-03

## 2023-01-03 RX ORDER — METFORMIN HYDROCHLORIDE 500 MG/1
500 TABLET, EXTENDED RELEASE ORAL
Qty: 30 TABLET | Refills: 1
Start: 2023-01-03

## 2023-01-03 ASSESSMENT — ENCOUNTER SYMPTOMS
ABDOMINAL DISTENTION: 0
ABDOMINAL PAIN: 0
DIARRHEA: 0
WHEEZING: 0
SINUS PAIN: 0
CONSTIPATION: 0
COUGH: 0
RHINORRHEA: 0
BLOOD IN STOOL: 0
SHORTNESS OF BREATH: 0
FACIAL SWELLING: 0
NAUSEA: 0
CHEST TIGHTNESS: 0

## 2023-01-03 ASSESSMENT — PATIENT HEALTH QUESTIONNAIRE - PHQ9
SUM OF ALL RESPONSES TO PHQ9 QUESTIONS 1 & 2: 0
SUM OF ALL RESPONSES TO PHQ QUESTIONS 1-9: 0
2. FEELING DOWN, DEPRESSED OR HOPELESS: 0
1. LITTLE INTEREST OR PLEASURE IN DOING THINGS: 0
SUM OF ALL RESPONSES TO PHQ QUESTIONS 1-9: 0

## 2023-01-03 NOTE — PROGRESS NOTES
Pako Rodriguez (:  1950) is a 74 y.o. female,Established patient, here for evaluation of the following chief complaint(s):  Follow up   Hypertension, Hyperlipidemia, and Diabetes         ASSESSMENT/PLAN:  1. Type 2 diabetes mellitus without complication, without long-term current use of insulin ([facility])  Hemoglobin A1c 5.7. Will decrease her metformin to 500 mg daily. Since patient lost weight and she has been trying that helped her sugars significantly. 2. Spinal stenosis of lumbar region with neurogenic claudication  She  still gets back pain on and off and achy legs but no radiation of the pain. Continue gabapentin 303 times a day. 3. HTN (hypertension), benign  Blood pressure optimal continue Diovan HCT. 4. Mixed hyperlipidemia  Continue pravastatin 20 mg daily. 5. Encounter for screening mammogram for malignant neoplasm of breast  -     TOÑO DIGITAL SCREEN W OR WO CAD BILATERAL; Future  Mammogram is due next month prescription given. 6. CANDIDA (obstructive sleep apnea)  Patient was tested for sleep apnea originally was negative but she did not go into REM sleep without was done in . In-home sleep study repeated in 2020 showed mild obstructive sleep apnea she had CPAP but she could not tolerated more than 2 months and she sent it back she did not want to use it    7. Anxiety  Continue Effexor as she has been doing well on that. Flu vaccination taken in   Kimberli Machado    Patient had EGD and colonoscopy in 2021. 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 And internal hemorrhoids    Discussed blood work  Carotid Doppler ultrasound done in 2022 was negative  Patient denies any dizziness    Muscle jerks at times and not consistent advised her to take magnesium 400 mg daily.   She has been taking magnesium she does not know what dose she will increase it to 400 mg daily if she still having problems to let me know  Return in about 4 months (around 5/3/2023). Subjective   SUBJECTIVE/OBJECTIVE:  Patient states that sometimes when she concentrates food is a needle her arms jerk once in a while    Hypertension  Pertinent negatives include no chest pain, headaches or shortness of breath. Hyperlipidemia  Pertinent negatives include no chest pain or shortness of breath. Diabetes  Pertinent negatives for hypoglycemia include no confusion, dizziness or headaches. Pertinent negatives for diabetes include no chest pain, no fatigue and no weakness. Review of Systems   Constitutional:  Negative for appetite change, chills, fatigue, fever and unexpected weight change. HENT:  Negative for congestion, facial swelling, mouth sores, rhinorrhea and sinus pain. Eyes:  Negative for visual disturbance. Respiratory:  Negative for cough, chest tightness, shortness of breath and wheezing. Cardiovascular:  Negative for chest pain and leg swelling. Gastrointestinal:  Negative for abdominal distention, abdominal pain, blood in stool, constipation, diarrhea and nausea. Genitourinary:  Negative for difficulty urinating, dysuria, frequency and urgency. Musculoskeletal:  Negative for joint swelling. Skin:  Negative for rash. Neurological:  Negative for dizziness, syncope, weakness, light-headedness and headaches. Psychiatric/Behavioral:  Negative for behavioral problems, confusion and hallucinations.          Objective   /62   Pulse 91   Temp 97.5 °F (36.4 °C)   Resp 18   Ht 5' 4\" (1.626 m)   Wt 199 lb (90.3 kg)   SpO2 95%   BMI 34.16 kg/m²   CBC with Differential:    Lab Results   Component Value Date/Time    WBC 4.7 12/29/2022 09:14 AM    RBC 4.60 12/29/2022 09:14 AM    HGB 14.3 12/29/2022 09:14 AM    HCT 43.2 12/29/2022 09:14 AM     12/29/2022 09:14 AM    MCV 93.9 12/29/2022 09:14 AM    MCH 31.1 12/29/2022 09:14 AM    MCHC 33.1 12/29/2022 09:14 AM    RDW 13.2 12/29/2022 09:14 AM    SEGSPCT 62 12/02/2013 09:25 AM    LYMPHOPCT 34.4 12/29/2022 09:14 AM    MONOPCT 8.6 12/29/2022 09:14 AM    BASOPCT 0.6 12/29/2022 09:14 AM    MONOSABS 0.41 12/29/2022 09:14 AM    LYMPHSABS 1.63 12/29/2022 09:14 AM    EOSABS 0.11 12/29/2022 09:14 AM    BASOSABS 0.03 12/29/2022 09:14 AM     CMP:    Lab Results   Component Value Date/Time     12/29/2022 09:14 AM    K 4.2 12/29/2022 09:14 AM     12/29/2022 09:14 AM    CO2 29 12/29/2022 09:14 AM    BUN 22 12/29/2022 09:14 AM    CREATININE 1.0 12/29/2022 09:14 AM    GFRAA >60 05/06/2022 09:57 AM    LABGLOM 59 12/29/2022 09:14 AM    GLUCOSE 112 12/29/2022 09:14 AM    GLUCOSE 107 02/04/2011 07:00 AM    PROT 7.0 12/29/2022 09:14 AM    LABALBU 3.9 12/29/2022 09:14 AM    LABALBU 4.0 02/04/2011 07:00 AM    CALCIUM 9.5 12/29/2022 09:14 AM    BILITOT 0.4 12/29/2022 09:14 AM    ALKPHOS 55 12/29/2022 09:14 AM    AST 22 12/29/2022 09:14 AM    ALT 17 12/29/2022 09:14 AM     HgBA1c:    Lab Results   Component Value Date/Time    LABA1C 5.7 12/29/2022 09:14 AM     Lipid:   Lab Results   Component Value Date    CHOL 158 04/07/2022    CHOL 160 08/17/2021    CHOL 140 09/03/2020     Lab Results   Component Value Date    TRIG 82 04/07/2022    TRIG 100 08/17/2021    TRIG 115 09/03/2020     Lab Results   Component Value Date    HDL 58 12/29/2022    HDL 65 04/07/2022    HDL 59 08/17/2021     Lab Results   Component Value Date    LDLCALC 85 12/29/2022    LDLCALC 77 04/07/2022    LDLCALC 81 08/17/2021     Lab Results   Component Value Date    LABVLDL 25 12/29/2022    LABVLDL 16 04/07/2022    LABVLDL 20 08/17/2021     No results found for: CHOLHDLRATIO  TSH:    Lab Results   Component Value Date/Time    TSH 3.360 12/29/2022 09:14 AM     Free T3: No results found for: T3FREE  Free T4:   Lab Results   Component Value Date/Time    T4FREE 1.04 12/29/2022 09:14 AM     Physical Exam  Constitutional:       Appearance: Normal appearance. HENT:      Head: Normocephalic and atraumatic.       Mouth/Throat:      Pharynx: Oropharynx is clear. Eyes:      Extraocular Movements: Extraocular movements intact. Pupils: Pupils are equal, round, and reactive to light. Cardiovascular:      Rate and Rhythm: Normal rate and regular rhythm. Pulses: Normal pulses. Heart sounds: Normal heart sounds. No murmur heard. Pulmonary:      Effort: Pulmonary effort is normal.      Breath sounds: Normal breath sounds. Abdominal:      General: Abdomen is flat. There is no distension. Palpations: Abdomen is soft. There is no mass. Tenderness: There is no abdominal tenderness. There is no guarding or rebound. Musculoskeletal:         General: No swelling. Normal range of motion. Cervical back: Normal range of motion and neck supple. Right lower leg: No edema. Left lower leg: No edema. Skin:     General: Skin is warm. Neurological:      General: No focal deficit present. Mental Status: She is alert and oriented to person, place, and time. Mental status is at baseline. An electronic signature was used to authenticate this note.     --Piper Putnam MD

## 2023-01-31 RX ORDER — AMLODIPINE BESYLATE 10 MG/1
5 TABLET ORAL DAILY
Qty: 90 TABLET | Refills: 1 | Status: SHIPPED | OUTPATIENT
Start: 2023-01-31

## 2023-01-31 NOTE — TELEPHONE ENCOUNTER
Last Appointment:  1/3/2023  Future Appointments   Date Time Provider Luann Diandra   5/3/2023  1:00 PM MD Roberto Walden [phone]

## 2023-02-14 RX ORDER — VALSARTAN AND HYDROCHLOROTHIAZIDE 160; 25 MG/1; MG/1
1 TABLET ORAL DAILY
Qty: 90 TABLET | Refills: 1 | Status: SHIPPED | OUTPATIENT
Start: 2023-02-14 | End: 2024-02-14

## 2023-03-06 RX ORDER — FLASH GLUCOSE SENSOR
KIT MISCELLANEOUS
Qty: 2 EACH | Refills: 4 | Status: SHIPPED | OUTPATIENT
Start: 2023-03-06

## 2023-03-07 RX ORDER — GABAPENTIN 300 MG/1
300 CAPSULE ORAL 3 TIMES DAILY
Qty: 270 CAPSULE | Refills: 0 | Status: SHIPPED | OUTPATIENT
Start: 2023-03-07 | End: 2023-06-05

## 2023-03-07 RX ORDER — METFORMIN HYDROCHLORIDE 500 MG/1
TABLET, EXTENDED RELEASE ORAL
Qty: 180 TABLET | Refills: 1 | Status: SHIPPED | OUTPATIENT
Start: 2023-03-07

## 2023-03-07 RX ORDER — PRAVASTATIN SODIUM 20 MG
TABLET ORAL
Qty: 90 TABLET | Refills: 1 | Status: SHIPPED | OUTPATIENT
Start: 2023-03-07

## 2023-03-07 NOTE — TELEPHONE ENCOUNTER
Last Appointment:  1/3/2023  Future Appointments   Date Time Provider Luann Jim   3/24/2023 12:30 PM [address] [facility]   5/3/2023  1:00 PM MD Roberto Davies

## 2023-03-24 ENCOUNTER — HOSPITAL ENCOUNTER (OUTPATIENT)
Dept: MAMMOGRAPHY | Age: 73
End: 2023-03-24
Payer: MEDICARE

## 2023-03-24 VITALS — WEIGHT: 197 LBS | HEIGHT: 63 IN | BODY MASS INDEX: 34.91 KG/M2

## 2023-03-24 DIAGNOSIS — Z12.31 ENCOUNTER FOR SCREENING MAMMOGRAM FOR MALIGNANT NEOPLASM OF BREAST: ICD-10-CM

## 2023-03-24 PROCEDURE — 77063 BREAST TOMOSYNTHESIS BI: CPT

## 2023-05-03 ENCOUNTER — OFFICE VISIT (OUTPATIENT)
Dept: INTERNAL MEDICINE CLINIC | Age: 73
End: 2023-05-03

## 2023-05-03 VITALS
HEIGHT: 63 IN | WEIGHT: 196 LBS | SYSTOLIC BLOOD PRESSURE: 124 MMHG | OXYGEN SATURATION: 97 % | TEMPERATURE: 97.4 F | DIASTOLIC BLOOD PRESSURE: 82 MMHG | BODY MASS INDEX: 34.73 KG/M2 | HEART RATE: 91 BPM

## 2023-05-03 DIAGNOSIS — I10 HTN (HYPERTENSION), BENIGN: ICD-10-CM

## 2023-05-03 DIAGNOSIS — G47.33 OSA (OBSTRUCTIVE SLEEP APNEA): ICD-10-CM

## 2023-05-03 DIAGNOSIS — E78.2 MIXED HYPERLIPIDEMIA: ICD-10-CM

## 2023-05-03 DIAGNOSIS — M54.16 LUMBAR RADICULOPATHY: ICD-10-CM

## 2023-05-03 DIAGNOSIS — E11.9 TYPE 2 DIABETES MELLITUS WITHOUT COMPLICATION, WITHOUT LONG-TERM CURRENT USE OF INSULIN (HCC): ICD-10-CM

## 2023-05-03 DIAGNOSIS — Z00.00 INITIAL MEDICARE ANNUAL WELLNESS VISIT: ICD-10-CM

## 2023-05-03 DIAGNOSIS — M40.05 POSTURAL KYPHOSIS OF THORACOLUMBAR REGION: ICD-10-CM

## 2023-05-03 DIAGNOSIS — Z00.00 ANNUAL PHYSICAL EXAM: Primary | ICD-10-CM

## 2023-05-03 DIAGNOSIS — Z00.00 MEDICARE ANNUAL WELLNESS VISIT, SUBSEQUENT: ICD-10-CM

## 2023-05-03 SDOH — HEALTH STABILITY: PHYSICAL HEALTH: ON AVERAGE, HOW MANY MINUTES DO YOU ENGAGE IN EXERCISE AT THIS LEVEL?: 20 MIN

## 2023-05-03 SDOH — HEALTH STABILITY: PHYSICAL HEALTH: ON AVERAGE, HOW MANY DAYS PER WEEK DO YOU ENGAGE IN MODERATE TO STRENUOUS EXERCISE (LIKE A BRISK WALK)?: 3 DAYS

## 2023-05-03 SDOH — ECONOMIC STABILITY: HOUSING INSECURITY
IN THE LAST 12 MONTHS, WAS THERE A TIME WHEN YOU DID NOT HAVE A STEADY PLACE TO SLEEP OR SLEPT IN A SHELTER (INCLUDING NOW)?: NO

## 2023-05-03 SDOH — ECONOMIC STABILITY: TRANSPORTATION INSECURITY
IN THE PAST 12 MONTHS, HAS LACK OF TRANSPORTATION KEPT YOU FROM MEETINGS, WORK, OR FROM GETTING THINGS NEEDED FOR DAILY LIVING?: NO

## 2023-05-03 SDOH — ECONOMIC STABILITY: FOOD INSECURITY: WITHIN THE PAST 12 MONTHS, YOU WORRIED THAT YOUR FOOD WOULD RUN OUT BEFORE YOU GOT MONEY TO BUY MORE.: SOMETIMES TRUE

## 2023-05-03 SDOH — ECONOMIC STABILITY: INCOME INSECURITY: HOW HARD IS IT FOR YOU TO PAY FOR THE VERY BASICS LIKE FOOD, HOUSING, MEDICAL CARE, AND HEATING?: NOT HARD AT ALL

## 2023-05-03 SDOH — ECONOMIC STABILITY: FOOD INSECURITY: WITHIN THE PAST 12 MONTHS, THE FOOD YOU BOUGHT JUST DIDN'T LAST AND YOU DIDN'T HAVE MONEY TO GET MORE.: NEVER TRUE

## 2023-05-03 ASSESSMENT — PATIENT HEALTH QUESTIONNAIRE - PHQ9
SUM OF ALL RESPONSES TO PHQ QUESTIONS 1-9: 0
2. FEELING DOWN, DEPRESSED OR HOPELESS: 0
SUM OF ALL RESPONSES TO PHQ9 QUESTIONS 1 & 2: 0
SUM OF ALL RESPONSES TO PHQ QUESTIONS 1-9: 0
1. LITTLE INTEREST OR PLEASURE IN DOING THINGS: 0

## 2023-05-03 ASSESSMENT — LIFESTYLE VARIABLES
HOW OFTEN DO YOU HAVE A DRINK CONTAINING ALCOHOL: NEVER
HOW MANY STANDARD DRINKS CONTAINING ALCOHOL DO YOU HAVE ON A TYPICAL DAY: PATIENT DOES NOT DRINK
HOW MANY STANDARD DRINKS CONTAINING ALCOHOL DO YOU HAVE ON A TYPICAL DAY: 0
HOW OFTEN DO YOU HAVE A DRINK CONTAINING ALCOHOL: 1
HOW OFTEN DO YOU HAVE SIX OR MORE DRINKS ON ONE OCCASION: 1

## 2023-05-03 NOTE — PROGRESS NOTES
Medicare Annual Wellness Visit    Saba Koroma is here for Medicare AWV (AWV)    Assessment & Plan   Annual physical exam  -     EKG 12 Lead  Initial Medicare annual wellness visit  Medicare annual wellness visit, subsequent  HTN (hypertension), benign  -     Vitamin D 25 Hydroxy; Future  -     Vitamin B12 & Folate; Future  -     Microalbumin / Creatinine Urine Ratio; Future  -     Hemoglobin A1C; Future  -     Lipid, Fasting; Future  -     T4, Free; Future  -     TSH; Future  -     Comprehensive Metabolic Panel; Future  -     CBC with Auto Differential; Future    Continue Diovan HCT , Norvasc  Blood pressure optimal  Mixed hyperlipidemia  -     Vitamin D 25 Hydroxy; Future  -     Vitamin B12 & Folate; Future  -     Microalbumin / Creatinine Urine Ratio; Future  -     Hemoglobin A1C; Future  -     Lipid, Fasting; Future  -     T4, Free; Future  -     TSH; Future  -     Comprehensive Metabolic Panel; Future  -     CBC with Auto Differential; Future    Blood work to check her A1c and lipid profile. Continue pravastatin 20 mg daily  CANDIDA (obstructive sleep apnea)  -     Vitamin D 25 Hydroxy; Future  -     Vitamin B12 & Folate; Future  -     Microalbumin / Creatinine Urine Ratio; Future  -     Hemoglobin A1C; Future  -     Lipid, Fasting; Future  -     T4, Free; Future  -     TSH; Future  -     Comprehensive Metabolic Panel; Future  -     CBC with Auto Differential; Future  She could not tolerate the CPAP she sent it back years ago. Lumbar radiculopathy  -     Vitamin D 25 Hydroxy; Future  -     Vitamin B12 & Folate; Future  -     Microalbumin / Creatinine Urine Ratio; Future  -     Hemoglobin A1C; Future  -     Lipid, Fasting; Future  -     T4, Free; Future  -     TSH; Future  -     Comprehensive Metabolic Panel; Future  -     CBC with Auto Differential; Future  -     [facility] [place]  For lumbar radiculopathy which has been fairly stable On The gabapentin 300 mg 3 times a day.   Type 2

## 2023-05-24 ENCOUNTER — EVALUATION (OUTPATIENT)
Dept: PHYSICAL THERAPY | Age: 73
End: 2023-05-24

## 2023-05-24 DIAGNOSIS — M40.05 POSTURAL KYPHOSIS OF THORACOLUMBAR REGION: ICD-10-CM

## 2023-05-24 DIAGNOSIS — M54.16 LUMBAR RADICULOPATHY: Primary | ICD-10-CM

## 2023-05-24 NOTE — PROGRESS NOTES
Physical Therapy Treatment Note    Date: 2023  Patient Name: Kourtney Desir  : 1950   MRN: [id number]  DOInjury: insidious onset   DOSx: -  Referring Provider: MD Gonzales Maher, [address]  [place] [address]     Medical Diagnosis:     M54.16 (ICD-10-CM) - Lumbar radiculopathy   M40.05 (ICD-10-CM) - Postural kyphosis of thoracolumbar region       Outcome Measure:  Oswestry 42% disability    Chasity Mcdonald ambulates with a markedly flexed trunk due to spinal stenosis. When she uses a walker and bends over she walk much longer distances, without a walker she is very limited to 200ft. We will work on trunk extension as she tolerates and strengthening combined with a strong strengthening program.    X = TO BE PERFORMED NEXT VISIT  > = PROGRESS TO THIS    S: See eval  O: Discussed anatomy, physiology, body mechanics, principles of loading, and progressive loading/activity. Reviewed home exercise program extensively; written copy provided. Time 1613-4204     Visit 1 Repeat outcome measure at mid point and end. Pain Pain with activity 5/10     ROM      Modalities Use sparingly if at all. Prefer an active program.     MH + ES   MO   Traction    MO         Manual      Sidelying frontal and transverse plane lumbar mobilizations with soft tissue mobilization    MT   ROM      Hooklying PPT   NR   SKTC   TE   DKTC   TE   Seated flexion   TE   Standing Trunk Extension    TE         Exercise      PPT Progression (supine > sitting > standing against wall > standing)   NR   Crunches with PPT   NR   Mat marches with PPT   NR   Trunk ext stretch x           ROWS: H   TE   ROWS: M  Reach and Pull x  TE   ROWS: L   Reach and Pull   TE   Tubing Pushes   TE   Standing tubing crunch   TE   Corebuilder    NR   Marching x  TA   Sidekicks  x  TA   Squats   TE   [place] deadlift 2-leg   TE   Alternating dumbbell shoulder press > Alternating dumbbell [place]   TE                     A:  Tolerated well.

## 2023-05-24 NOTE — PROGRESS NOTES
[facility] OUTPATIENT REHABILITATION  PHYSICAL THERAPY INITIAL EVALUATION         Date:  2023   Patient: Tanja Rodriguez  : 1950  MRN: [id number]  Referring Provider: Selvin Jasso MD  [facility], [address]  [place] [address]     Medical Diagnosis:     M54.16 (ICD-10-CM) - Lumbar radiculopathy   M40.05 (ICD-10-CM) - Postural kyphosis of thoracolumbar region       Physician Order: Eval and Treat     SUBJECTIVE:     Onset date:     Onset: Insidious      History / Mechanism of Injury: noticed she was walking bent over    Interventions for current problem: epidurals    Chief complaint: walking hunched over and LBP    Behavior: condition is getting worse    Pain:   Current: 0/10     Best: 0/10     Worst:5/10 (occurs with walking and standing). Pain returns to baseline in moments  Pain frequency: intermittent    Symptom Type / Quality: aching  Location[de-identified] Back: lumbar region, right lateral side, and left lateral side radiates down the posterior right leg to knee but hasn't had leg pain in 5-6 months  LB/LE Ratio: 100/0       Provoking Activities/Positions: standing, walking                 Relieving Activitie/Positions: sitting, lying on side    Disturbed Sleep: no  Bladder Dysfunction: no  Bowel Dysfunction: no     Imaging results: No results found.     Past Medical History:  Past Medical History:   Diagnosis Date    Arthritis     Asthma     no inhaler, controlled ; hist years ago of asthmatic bronchitis, denies problems since    Depression     Diabetes mellitus ([address]     History of blood transfusion     Hyperlipidemia     Hypertension     Low back pain     Right cataract 2018     Past Surgical History:   Procedure Laterality Date    ABDOMEN SURGERY      CATARACT REMOVAL WITH IMPLANT Right 2018     SECTION      COLONOSCOPY      EYE SURGERY      bilateral cataracts    FRACTURE SURGERY      left foot    JOINT REPLACEMENT      total left knee    KNEE

## 2023-05-25 PROBLEM — M40.05 POSTURAL KYPHOSIS OF THORACOLUMBAR REGION: Status: ACTIVE | Noted: 2023-05-25

## 2023-05-26 ENCOUNTER — TREATMENT (OUTPATIENT)
Dept: PHYSICAL THERAPY | Age: 73
End: 2023-05-26

## 2023-05-26 DIAGNOSIS — M40.05 POSTURAL KYPHOSIS OF THORACOLUMBAR REGION: ICD-10-CM

## 2023-05-26 DIAGNOSIS — M54.16 LUMBAR RADICULOPATHY: Primary | ICD-10-CM

## 2023-05-26 NOTE — PROGRESS NOTES
Physical Therapy Treatment Note    Date: 2023  Patient Name: Manish Huff  : 1950   MRN: [id number]  DOInjury: insidious onset   DOSx: -  Referring Provider: MD Gonzales Anderson [facility], [address]  [place] [address]     Medical Diagnosis:     M54.16 (ICD-10-CM) - Lumbar radiculopathy   M40.05 (ICD-10-CM) - Postural kyphosis of thoracolumbar region       Outcome Measure:  Oswestry 42% disability    Cari Yarbrough ambulates with a markedly flexed trunk due to spinal stenosis. When she uses a walker and bends over she walk much longer distances, without a walker she is very limited to 200ft. We will work on trunk extension as she tolerates and strengthening combined with a strong home exercise program.    X = TO BE PERFORMED NEXT VISIT  > = PROGRESS TO THIS    S: See eval  O: Discussed anatomy, physiology, body mechanics, principles of loading, and progressive loading/activity. Reviewed home exercise program extensively; written copy provided. Time 7339-3382     Visit 2 Repeat outcome measure at mid point and end. Pain Pain with activity 5/10     ROM      Modalities Use sparingly if at all.   Prefer an active program.     MH + ES   MO   Traction    MO         Manual      Sidelying frontal and transverse plane lumbar mobilizations with soft tissue mobilization    MT   ROM      Hooklying PPT   NR   SKTC   TE   DKTC   TE   Seated flexion   TE   Standing Trunk Extension    TE   nustep 7 min  64 SPM goal  TA         PPT Progression (supine > sitting > standing against wall > standing)   NR   Crunches with PPT   NR   Mat marches with PPT   NR   Trunk ext stretch x           ROWS: H   TE   ROWS: M  Reach and Pull Seated 3 x 10 green  TA   ROWS: L   Reach and Pull   TE   Tubing Pushes   TE   Standing tubing crunch   TE   Corebuilder    NR   Marching 2 x 20 superset TA   Calf raises 2 x 20 superset TA   Sit to stand 2 x 10 30 sec rest between sets TA   [place] deadlift 2-leg   TE   Alternating

## 2023-05-29 ENCOUNTER — PATIENT MESSAGE (OUTPATIENT)
Dept: INTERNAL MEDICINE CLINIC | Age: 73
End: 2023-05-29

## 2023-05-30 ENCOUNTER — TELEPHONE (OUTPATIENT)
Dept: INTERNAL MEDICINE CLINIC | Age: 73
End: 2023-05-30

## 2023-05-30 DIAGNOSIS — E11.9 TYPE 2 DIABETES MELLITUS WITHOUT COMPLICATION, WITHOUT LONG-TERM CURRENT USE OF INSULIN (HCC): Primary | ICD-10-CM

## 2023-05-30 RX ORDER — LANCETS 30 GAUGE
1 EACH MISCELLANEOUS DAILY
Qty: 100 EACH | Refills: 5 | Status: SHIPPED | OUTPATIENT
Start: 2023-05-30

## 2023-05-30 RX ORDER — GLUCOSAMINE HCL/CHONDROITIN SU 500-400 MG
CAPSULE ORAL
Qty: 100 STRIP | Refills: 4 | Status: SHIPPED | OUTPATIENT
Start: 2023-05-30

## 2023-05-30 NOTE — TELEPHONE ENCOUNTER
Pt needs a rx for new glucometer  with strips and lancets ---what ever insurance will cover ---pt checks sugar once qd ---pt goes to [facility] in [place]--mmp

## 2023-05-30 NOTE — TELEPHONE ENCOUNTER
From: Adrian Doshi  To: Dr. Angela Cruz: 5/29/2023 12:13 PM EDT  Subject: Blood sugar     Since I started doing physical therapy my blood sugar has been running pretty low. I'm not sure if I should still continue taking metforman. on my meter it has been in the 60 and 70s or lower. i did a finger prick and it was low as well. However my meter and test strips are old.

## 2023-05-30 NOTE — TELEPHONE ENCOUNTER
I wrote a script for new glucometer and strips and if the bs still low less than 80 decrease metformin to 1 tab daily only and let me know

## 2023-05-31 ENCOUNTER — TELEPHONE (OUTPATIENT)
Dept: INTERNAL MEDICINE CLINIC | Age: 73
End: 2023-05-31

## 2023-05-31 ENCOUNTER — TREATMENT (OUTPATIENT)
Dept: PHYSICAL THERAPY | Age: 73
End: 2023-05-31

## 2023-05-31 DIAGNOSIS — M54.16 LUMBAR RADICULOPATHY: Primary | ICD-10-CM

## 2023-05-31 DIAGNOSIS — E11.9 TYPE 2 DIABETES MELLITUS WITHOUT COMPLICATION, WITHOUT LONG-TERM CURRENT USE OF INSULIN (HCC): Primary | ICD-10-CM

## 2023-05-31 DIAGNOSIS — M40.05 POSTURAL KYPHOSIS OF THORACOLUMBAR REGION: ICD-10-CM

## 2023-05-31 RX ORDER — BLOOD-GLUCOSE METER
1 EACH MISCELLANEOUS DAILY
Qty: 1 KIT | Refills: 0 | Status: CANCELLED | OUTPATIENT
Start: 2023-05-31

## 2023-05-31 NOTE — PROGRESS NOTES
Physical Therapy Treatment Note    Date: 2023  Patient Name: Kusum Florian  : 1950   MRN: [id number]  DOInjury: insidious onset   DOSx: -  Referring Provider: MD Gonzales Bartlett, [address]  NAD[place] HIGGINS  [place] [address]     Medical Diagnosis:     M54.16 (ICD-10-CM) - Lumbar radiculopathy   M40.05 (ICD-10-CM) - Postural kyphosis of thoracolumbar region       Outcome Measure:  Oswestry 42% disability    Coleman Recio ambulates with a markedly flexed trunk due to spinal stenosis. When she uses a walker and bends over she walk much longer distances, without a walker she is very limited to 200ft. We will work on trunk extension as she tolerates and strengthening combined with a strong home exercise program.    X = TO BE PERFORMED NEXT VISIT  > = PROGRESS TO THIS    S: See eval  O: Discussed anatomy, physiology, body mechanics, principles of loading, and progressive loading/activity. Reviewed home exercise program extensively; written copy provided. Access Code: [email or web address]  URL: [email or web address]  Date: 2023  Prepared by: Nickie Goode    Exercises  - Standing Scapular Retraction  - 2 x daily - 7 x weekly - 2 reps - 1 minute hold  - Standing Marching  - 2 x daily - 7 x weekly - 2 sets - 20 reps  - Standing Heel Raise  - 2 x daily - 7 x weekly - 3 sets - 20 reps  - Sit to Stand with Arms Crossed  - 2 x daily - 7 x weekly - 2 sets - 10 reps  - Split Stance Shoulder Row with Resistance  - 2 x daily - 7 x weekly - 4 sets - 15 reps    Time 7850-9961     Visit 2 Repeat outcome measure at mid point and end. Pain 2-3/10     ROM      Modalities Use sparingly if at all.   Prefer an active program.     MH + ES   MO   Traction    MO         Manual      Sidelying frontal and transverse plane lumbar mobilizations with soft tissue mobilization    MT   ROM      Hooklying PPT   NR   SKTC   TE   DKTC   TE   Seated flexion   TE   Standing Trunk Extension    TE   nustep 10 min  75 SPM goal  TA

## 2023-05-31 NOTE — TELEPHONE ENCOUNTER
2nd request    Pharmacy needs rx for one touch ultra glucometer     We can also fax it to [phone]

## 2023-06-01 RX ORDER — BLOOD-GLUCOSE METER
1 EACH MISCELLANEOUS DAILY
Qty: 1 KIT | Refills: 0 | Status: SHIPPED | OUTPATIENT
Start: 2023-06-01

## 2023-06-05 RX ORDER — GABAPENTIN 300 MG/1
300 CAPSULE ORAL 3 TIMES DAILY
Qty: 270 CAPSULE | Refills: 0 | Status: SHIPPED | OUTPATIENT
Start: 2023-06-05 | End: 2023-09-03

## 2023-06-21 ENCOUNTER — TREATMENT (OUTPATIENT)
Dept: PHYSICAL THERAPY | Age: 73
End: 2023-06-21
Payer: MEDICARE

## 2023-06-21 DIAGNOSIS — M54.16 LUMBAR RADICULOPATHY: Primary | ICD-10-CM

## 2023-06-21 DIAGNOSIS — M40.05 POSTURAL KYPHOSIS OF THORACOLUMBAR REGION: ICD-10-CM

## 2023-06-21 PROCEDURE — 97110 THERAPEUTIC EXERCISES: CPT | Performed by: PHYSICAL THERAPIST

## 2023-06-26 ENCOUNTER — TREATMENT (OUTPATIENT)
Dept: PHYSICAL THERAPY | Age: 73
End: 2023-06-26
Payer: MEDICARE

## 2023-06-26 DIAGNOSIS — M54.16 LUMBAR RADICULOPATHY: Primary | ICD-10-CM

## 2023-06-26 DIAGNOSIS — M40.05 POSTURAL KYPHOSIS OF THORACOLUMBAR REGION: ICD-10-CM

## 2023-06-26 PROCEDURE — 97530 THERAPEUTIC ACTIVITIES: CPT | Performed by: PHYSICAL THERAPIST

## 2023-07-07 ENCOUNTER — TREATMENT (OUTPATIENT)
Dept: PHYSICAL THERAPY | Age: 73
End: 2023-07-07

## 2023-07-07 DIAGNOSIS — M54.16 LUMBAR RADICULOPATHY: Primary | ICD-10-CM

## 2023-07-07 DIAGNOSIS — M40.05 POSTURAL KYPHOSIS OF THORACOLUMBAR REGION: ICD-10-CM

## 2023-07-07 NOTE — PROGRESS NOTES
Physical Therapy Treatment Note    Date: 2023  Patient Name: Alisha Rivas  : 1950   MRN: [id number]  DOInjury: insidious onset   DOSx: -  Referring Provider: Lisy Domínguez MD  [facility], [address]  [address]     Medical Diagnosis:     M54.16 (ICD-10-CM) - Lumbar radiculopathy   M40.05 (ICD-10-CM) - Postural kyphosis of thoracolumbar region       Outcome Measure:  Oswestry 42% disability at initial eval  Oswestry 22% 2023    Yocasta Reilly ambulates with a markedly flexed trunk due to spinal stenosis. When she uses a walker and bends over she walk much longer distances, without a walker she is very limited to 200ft. We will work on trunk extension as she tolerates and strengthening combined with a strong home exercise program.    X = TO BE PERFORMED NEXT VISIT  > = PROGRESS TO THIS    S: patient reports she feels better and people around her notice she is walking more upright    O: scissoring gait is significant today    Access Code: [email or web address]  URL: [email or web address]  Date: 2023  Prepared by: Jodi Real    Exercises  - Standing Scapular Retraction  - 2 x daily - 7 x weekly - 2 reps - 1 minute hold  - Standing Marching  - 2 x daily - 7 x weekly - 2 sets - 20 reps  - Standing Heel Raise  - 2 x daily - 7 x weekly - 3 sets - 20 reps  - Sit to Stand with Arms Crossed  - 2 x daily - 7 x weekly - 2 sets - 10 reps  - Split Stance Shoulder Row with Resistance  - 2 x daily - 7 x weekly - 4 sets - 15 reps    Time 0861-3082     Visit 5/6 Repeat outcome measure at mid point and end. Pain 1/10  Mid back      ROM      Modalities Use sparingly if at all.   Prefer an active program.     MH + ES   MO   Traction    MO         Manual      Sidelying frontal and transverse plane lumbar mobilizations with soft tissue mobilization    MT   ROM      Hooklying PPT   NR   SKTC   TE   DKTC   TE   Seated flexion   TE   Standing Trunk Extension    TE   nustep 10 min  75 SPM goal Maintained

## 2023-07-11 ENCOUNTER — TREATMENT (OUTPATIENT)
Dept: PHYSICAL THERAPY | Age: 73
End: 2023-07-11
Payer: MEDICARE

## 2023-07-11 DIAGNOSIS — M40.05 POSTURAL KYPHOSIS OF THORACOLUMBAR REGION: Primary | ICD-10-CM

## 2023-07-11 DIAGNOSIS — M54.16 LUMBAR RADICULOPATHY: ICD-10-CM

## 2023-07-11 PROCEDURE — 97530 THERAPEUTIC ACTIVITIES: CPT | Performed by: PHYSICAL THERAPIST

## 2023-07-11 NOTE — PROGRESS NOTES
Physical Therapy Treatment Note    Date: 2023  Patient Name: Grady Chase  : 1950   MRN: [id number]  DOInjury: insidious onset   DOSx: -  Referring Provider: Jess Beck MD  [address] [employer] [facility], [address]  [address]     Medical Diagnosis:     M54.16 (ICD-10-CM) - Lumbar radiculopathy   M40.05 (ICD-10-CM) - Postural kyphosis of thoracolumbar region       Outcome Measure:  Oswestry 42% disability at initial eval  Oswestry 22% 2023    Winston Redd ambulates with a markedly flexed trunk due to spinal stenosis. When she uses a walker and bends over she walk much longer distances, without a walker she is very limited to 200ft. We will work on trunk extension as she tolerates and strengthening combined with a strong home exercise program.    X = TO BE PERFORMED NEXT VISIT  > = PROGRESS TO THIS    S: patient reports she feels better and people around her notice she is walking more upright    O: scissoring gait is significant today    Access Code: [email or web address]  URL: [email or web address]  Date: 2023  Prepared by: Kirti Hobson    Exercises  - Standing Scapular Retraction  - 2 x daily - 7 x weekly - 2 reps - 1 minute hold  - Standing Marching  - 2 x daily - 7 x weekly - 2 sets - 20 reps  - Standing Heel Raise  - 2 x daily - 7 x weekly - 3 sets - 20 reps  - Sit to Stand with Arms Crossed  - 2 x daily - 7 x weekly - 2 sets - 10 reps  - Split Stance Shoulder Row with Resistance  - 2 x daily - 7 x weekly - 4 sets - 15 reps    Time 1767-2417     Visit 2/4 Repeat outcome measure at mid point and end. Pain 1/10  Mid back      ROM      Modalities Use sparingly if at all.   Prefer an active program.     MH + ES   MO   Traction    MO         Manual      Sidelying frontal and transverse plane lumbar mobilizations with soft tissue mobilization    MT   ROM      Hooklying PPT   NR   SKTC   TE   DKTC   TE   Seated flexion   TE   Standing Trunk Extension    TE   nustep 10 min  80 SPM goal Maintained

## 2023-07-15 SDOH — HEALTH STABILITY: PHYSICAL HEALTH: ON AVERAGE, HOW MANY DAYS PER WEEK DO YOU ENGAGE IN MODERATE TO STRENUOUS EXERCISE (LIKE A BRISK WALK)?: 4 DAYS

## 2023-07-15 SDOH — HEALTH STABILITY: PHYSICAL HEALTH: ON AVERAGE, HOW MANY MINUTES DO YOU ENGAGE IN EXERCISE AT THIS LEVEL?: 30 MIN

## 2023-07-18 ENCOUNTER — OFFICE VISIT (OUTPATIENT)
Dept: FAMILY MEDICINE CLINIC | Age: 73
End: 2023-07-18
Payer: MEDICARE

## 2023-07-18 VITALS
HEART RATE: 62 BPM | DIASTOLIC BLOOD PRESSURE: 80 MMHG | HEIGHT: 63 IN | BODY MASS INDEX: 34.55 KG/M2 | WEIGHT: 195 LBS | OXYGEN SATURATION: 95 % | RESPIRATION RATE: 18 BRPM | SYSTOLIC BLOOD PRESSURE: 128 MMHG

## 2023-07-18 DIAGNOSIS — M25.551 BILATERAL HIP PAIN: Primary | ICD-10-CM

## 2023-07-18 DIAGNOSIS — I10 HTN (HYPERTENSION), BENIGN: Chronic | ICD-10-CM

## 2023-07-18 DIAGNOSIS — E03.9 ACQUIRED HYPOTHYROIDISM: ICD-10-CM

## 2023-07-18 DIAGNOSIS — E53.8 VITAMIN B 12 DEFICIENCY: ICD-10-CM

## 2023-07-18 DIAGNOSIS — E11.42 TYPE 2 DIABETES MELLITUS WITH DIABETIC POLYNEUROPATHY, WITHOUT LONG-TERM CURRENT USE OF INSULIN (HCC): ICD-10-CM

## 2023-07-18 DIAGNOSIS — E11.9 TYPE 2 DIABETES MELLITUS WITHOUT COMPLICATION, WITHOUT LONG-TERM CURRENT USE OF INSULIN (HCC): ICD-10-CM

## 2023-07-18 DIAGNOSIS — E78.2 MIXED HYPERLIPIDEMIA: ICD-10-CM

## 2023-07-18 DIAGNOSIS — M25.552 BILATERAL HIP PAIN: Primary | ICD-10-CM

## 2023-07-18 DIAGNOSIS — R53.82 CHRONIC FATIGUE: ICD-10-CM

## 2023-07-18 PROCEDURE — 3078F DIAST BP <80 MM HG: CPT | Performed by: FAMILY MEDICINE

## 2023-07-18 PROCEDURE — 1123F ACP DISCUSS/DSCN MKR DOCD: CPT | Performed by: FAMILY MEDICINE

## 2023-07-18 PROCEDURE — 3074F SYST BP LT 130 MM HG: CPT | Performed by: FAMILY MEDICINE

## 2023-07-18 PROCEDURE — 99204 OFFICE O/P NEW MOD 45 MIN: CPT | Performed by: FAMILY MEDICINE

## 2023-07-18 RX ORDER — AMLODIPINE BESYLATE 10 MG/1
5 TABLET ORAL DAILY
Qty: 90 TABLET | Refills: 1
Start: 2023-07-18

## 2023-07-18 RX ORDER — METFORMIN HYDROCHLORIDE 500 MG/1
500 TABLET, EXTENDED RELEASE ORAL 2 TIMES DAILY
Qty: 180 TABLET | Refills: 4
Start: 2023-07-18

## 2023-07-18 RX ORDER — PRAVASTATIN SODIUM 20 MG
20 TABLET ORAL NIGHTLY
Qty: 90 TABLET | Refills: 4
Start: 2023-07-18

## 2023-07-18 RX ORDER — VALSARTAN AND HYDROCHLOROTHIAZIDE 160; 25 MG/1; MG/1
1 TABLET ORAL DAILY
Qty: 90 TABLET | Refills: 1
Start: 2023-07-18 | End: 2024-07-17

## 2023-07-19 ENCOUNTER — TREATMENT (OUTPATIENT)
Dept: PHYSICAL THERAPY | Age: 73
End: 2023-07-19

## 2023-07-19 DIAGNOSIS — M54.16 LUMBAR RADICULOPATHY: ICD-10-CM

## 2023-07-19 DIAGNOSIS — M40.05 POSTURAL KYPHOSIS OF THORACOLUMBAR REGION: Primary | ICD-10-CM

## 2023-07-19 NOTE — PROGRESS NOTES
Physical Therapy Treatment Note    Date: 2023  Patient Name: Mahnaz Fletcher  : 1950   MRN: [id number]  DOInjury: insidious onset   DOSx: -  Referring Provider: Siobhan Conn MD  [address] [employer] [facility], [address]  [address]     Medical Diagnosis:     M54.16 (ICD-10-CM) - Lumbar radiculopathy   M40.05 (ICD-10-CM) - Postural kyphosis of thoracolumbar region       Outcome Measure:  Oswestry 42% disability at initial eval  Oswestry 22% 2023    Jeffery July ambulates with a markedly flexed trunk due to spinal stenosis. When she uses a walker and bends over she walk much longer distances, without a walker she is very limited to 200ft. We will work on trunk extension as she tolerates and strengthening combined with a strong home exercise program.    X = TO BE PERFORMED NEXT VISIT  > = PROGRESS TO THIS    S: patient reports she is discouraged about her upright posture. Able to walk the inner track but feels that her posture decreased as she went around. O:   Access Code: [email or web address]  URL: [email or web address]  Date: 2023  Prepared by: William Cheng    Exercises  - Standing Scapular Retraction  - 2 x daily - 7 x weekly - 2 reps - 1 minute hold  - Standing Marching  - 2 x daily - 7 x weekly - 2 sets - 20 reps  - Standing Heel Raise  - 2 x daily - 7 x weekly - 3 sets - 20 reps  - Sit to Stand with Arms Crossed  - 2 x daily - 7 x weekly - 2 sets - 10 reps  - Split Stance Shoulder Row with Resistance  - 2 x daily - 7 x weekly - 4 sets - 15 reps    Time 5777-8477     Visit 3/4 Repeat outcome measure at mid point and end. Pain 1/10  Mid back      ROM      Modalities Use sparingly if at all.   Prefer an active program.     MH + ES   MO   Traction    MO         Manual      Sidelying frontal and transverse plane lumbar mobilizations with soft tissue mobilization    MT   ROM      Hooklying PPT   NR   SKTC   TE   DKTC   TE   Seated flexion   TE   Standing Trunk Extension    TE   nustep 10 min  80

## 2023-07-25 ASSESSMENT — ENCOUNTER SYMPTOMS
RECTAL PAIN: 0
NAUSEA: 0
SORE THROAT: 0
SINUS PRESSURE: 0
FACIAL SWELLING: 0
SHORTNESS OF BREATH: 0
COLOR CHANGE: 0
STRIDOR: 0
WHEEZING: 0
EYE REDNESS: 0
ABDOMINAL PAIN: 0
CONSTIPATION: 0
DIARRHEA: 0
TROUBLE SWALLOWING: 0
ABDOMINAL DISTENTION: 0
CHEST TIGHTNESS: 0
APNEA: 0
RHINORRHEA: 0
CHOKING: 0
EYE PAIN: 0
BLOOD IN STOOL: 0
BACK PAIN: 0
VOICE CHANGE: 0
COUGH: 0
ANAL BLEEDING: 0
PHOTOPHOBIA: 0
VOMITING: 0
EYE DISCHARGE: 0
EYE ITCHING: 0

## 2023-07-25 NOTE — PROGRESS NOTES
to visit. Allergies   Allergen Reactions    Penicillins Rash       I have reviewedher allergies, medications, problem list, medical, social and family history and have updated as needed in the electronic medical record. Objective:     Physical Exam  Vitals and nursing note reviewed. Constitutional:       General: She is not in acute distress. Appearance: She is well-developed. She is not diaphoretic. HENT:      Head: Normocephalic and atraumatic. Right Ear: External ear normal.      Left Ear: External ear normal.      Nose: Nose normal.      Mouth/Throat:      Pharynx: No oropharyngeal exudate. Eyes:      General: No scleral icterus. Right eye: No discharge. Left eye: No discharge. Conjunctiva/sclera: Conjunctivae normal.      Pupils: Pupils are equal, round, and reactive to light. Neck:      Thyroid: No thyromegaly. Vascular: No JVD. Trachea: No tracheal deviation. Cardiovascular:      Rate and Rhythm: Normal rate and regular rhythm. Heart sounds: Normal heart sounds. No murmur heard. No friction rub. No gallop. Pulmonary:      Effort: Pulmonary effort is normal. No respiratory distress. Breath sounds: Normal breath sounds. No stridor. No wheezing or rales. Chest:      Chest wall: No tenderness. Abdominal:      General: Bowel sounds are normal. There is no distension. Palpations: Abdomen is soft. There is no mass. Tenderness: There is no abdominal tenderness. There is no guarding or rebound. Genitourinary:     Comments: Will follow with own gynecologist for gynecological and breast care. Musculoskeletal:         General: No tenderness. Normal range of motion. Cervical back: Normal range of motion and neck supple. Lymphadenopathy:      Cervical: No cervical adenopathy. Skin:     General: Skin is warm and dry. Coloration: Skin is not pale. Findings: No erythema or rash.    Neurological:      Mental Status:

## 2023-07-26 DIAGNOSIS — E11.42 DIABETIC PERIPHERAL NEUROPATHY (HCC): Primary | ICD-10-CM

## 2023-08-03 DIAGNOSIS — M54.16 LUMBAR RADICULOPATHY: Primary | Chronic | ICD-10-CM

## 2023-08-03 DIAGNOSIS — M51.36 LUMBAR DEGENERATIVE DISC DISEASE: ICD-10-CM

## 2023-08-03 DIAGNOSIS — M48.062 SPINAL STENOSIS OF LUMBAR REGION WITH NEUROGENIC CLAUDICATION: ICD-10-CM

## 2023-08-16 ENCOUNTER — OFFICE VISIT (OUTPATIENT)
Dept: FAMILY MEDICINE CLINIC | Age: 73
End: 2023-08-16
Payer: MEDICARE

## 2023-08-16 VITALS
BODY MASS INDEX: 34.73 KG/M2 | WEIGHT: 196 LBS | OXYGEN SATURATION: 97 % | SYSTOLIC BLOOD PRESSURE: 124 MMHG | HEIGHT: 63 IN | RESPIRATION RATE: 18 BRPM | HEART RATE: 76 BPM | DIASTOLIC BLOOD PRESSURE: 74 MMHG

## 2023-08-16 DIAGNOSIS — I10 HTN (HYPERTENSION), BENIGN: ICD-10-CM

## 2023-08-16 DIAGNOSIS — E03.9 ACQUIRED HYPOTHYROIDISM: ICD-10-CM

## 2023-08-16 DIAGNOSIS — R53.82 CHRONIC FATIGUE: ICD-10-CM

## 2023-08-16 DIAGNOSIS — M48.061 NEURAL FORAMINAL STENOSIS OF LUMBAR SPINE: Chronic | ICD-10-CM

## 2023-08-16 DIAGNOSIS — E78.2 MIXED HYPERLIPIDEMIA: ICD-10-CM

## 2023-08-16 DIAGNOSIS — E11.9 TYPE 2 DIABETES MELLITUS WITHOUT COMPLICATION, WITHOUT LONG-TERM CURRENT USE OF INSULIN (HCC): ICD-10-CM

## 2023-08-16 DIAGNOSIS — E53.8 VITAMIN B 12 DEFICIENCY: ICD-10-CM

## 2023-08-16 DIAGNOSIS — S42.402D CLOSED FRACTURE OF LEFT ELBOW WITH ROUTINE HEALING, SUBSEQUENT ENCOUNTER: Primary | ICD-10-CM

## 2023-08-16 PROCEDURE — 99214 OFFICE O/P EST MOD 30 MIN: CPT | Performed by: FAMILY MEDICINE

## 2023-08-16 PROCEDURE — 1123F ACP DISCUSS/DSCN MKR DOCD: CPT | Performed by: FAMILY MEDICINE

## 2023-08-16 PROCEDURE — 3078F DIAST BP <80 MM HG: CPT | Performed by: FAMILY MEDICINE

## 2023-08-16 PROCEDURE — 3074F SYST BP LT 130 MM HG: CPT | Performed by: FAMILY MEDICINE

## 2023-08-16 RX ORDER — ACETAMINOPHEN AND CODEINE PHOSPHATE 300; 30 MG/1; MG/1
1 TABLET ORAL EVERY 4 HOURS PRN
Qty: 18 TABLET | Refills: 0 | Status: SHIPPED | OUTPATIENT
Start: 2023-08-16 | End: 2023-08-19

## 2023-08-16 RX ORDER — BACLOFEN 10 MG/1
10 TABLET ORAL 3 TIMES DAILY
Qty: 30 TABLET | Refills: 0 | Status: SHIPPED | OUTPATIENT
Start: 2023-08-16 | End: 2023-08-26

## 2023-08-17 DIAGNOSIS — I10 HTN (HYPERTENSION), BENIGN: Chronic | ICD-10-CM

## 2023-08-17 RX ORDER — VALSARTAN AND HYDROCHLOROTHIAZIDE 160; 25 MG/1; MG/1
TABLET ORAL
Qty: 90 TABLET | Refills: 0 | Status: SHIPPED | OUTPATIENT
Start: 2023-08-17

## 2023-08-17 RX ORDER — VALSARTAN AND HYDROCHLOROTHIAZIDE 160; 25 MG/1; MG/1
1 TABLET ORAL DAILY
Qty: 90 TABLET | Refills: 1 | Status: SHIPPED
Start: 2023-08-17 | End: 2023-08-17

## 2023-08-23 DIAGNOSIS — S42.402D CLOSED FRACTURE OF LEFT ELBOW WITH ROUTINE HEALING, SUBSEQUENT ENCOUNTER: Primary | ICD-10-CM

## 2023-08-25 ENCOUNTER — OFFICE VISIT (OUTPATIENT)
Dept: NEUROSURGERY | Age: 73
End: 2023-08-25
Payer: MEDICARE

## 2023-08-25 VITALS
SYSTOLIC BLOOD PRESSURE: 103 MMHG | HEIGHT: 63 IN | BODY MASS INDEX: 34.73 KG/M2 | HEART RATE: 66 BPM | DIASTOLIC BLOOD PRESSURE: 54 MMHG | WEIGHT: 196 LBS | TEMPERATURE: 97.6 F | OXYGEN SATURATION: 93 %

## 2023-08-25 DIAGNOSIS — M40.204 KYPHOSIS OF THORACIC REGION, UNSPECIFIED KYPHOSIS TYPE: Primary | ICD-10-CM

## 2023-08-25 PROCEDURE — 1123F ACP DISCUSS/DSCN MKR DOCD: CPT | Performed by: PHYSICIAN ASSISTANT

## 2023-08-25 PROCEDURE — 3074F SYST BP LT 130 MM HG: CPT | Performed by: PHYSICIAN ASSISTANT

## 2023-08-25 PROCEDURE — 3078F DIAST BP <80 MM HG: CPT | Performed by: PHYSICIAN ASSISTANT

## 2023-08-25 PROCEDURE — 99213 OFFICE O/P EST LOW 20 MIN: CPT | Performed by: PHYSICIAN ASSISTANT

## 2023-08-25 PROCEDURE — 99202 OFFICE O/P NEW SF 15 MIN: CPT

## 2023-08-25 ASSESSMENT — ENCOUNTER SYMPTOMS
FACIAL SWELLING: 0
VOMITING: 0
COLOR CHANGE: 0
ABDOMINAL DISTENTION: 0
SHORTNESS OF BREATH: 0
DIARRHEA: 0
VOICE CHANGE: 0
APNEA: 0
RHINORRHEA: 0
EYE PAIN: 0
SINUS PRESSURE: 0
BACK PAIN: 1
CHEST TIGHTNESS: 0
TROUBLE SWALLOWING: 0
COUGH: 0
CHOKING: 0
ANAL BLEEDING: 0
STRIDOR: 0
EYE DISCHARGE: 0
EYE ITCHING: 0
RECTAL PAIN: 0
PHOTOPHOBIA: 0
CONSTIPATION: 0
WHEEZING: 0
BLOOD IN STOOL: 0
NAUSEA: 0
ABDOMINAL PAIN: 0
SORE THROAT: 0
EYE REDNESS: 0

## 2023-08-25 NOTE — PROGRESS NOTES
Problem Focused Follow-Up    Subjective: Patient is a pleasant 73 yo female, presenting with a CC of \"I feel hunched over\". Patient is well known to our service, and was last seen in 2019 with a recommendation for L2-L5 laminectomy and fusion for back pain and neurogenic claudication (PT and Pain Management modalities previously completed at that time). Currently, she only attests to low back pain while in prolonged positions, and denies LE pain, weakness, n/t. Patient states her main concerns is the feeling of leaning forward and the inability to stand upright for a period of time. She has tried PT recently and does admit to feeling stronger. She denies any pain the thoracic region or thorax. Patient does have a PMHx significant for osteopenia. She ambulates extended distances with a walker. No loss of bowel or bladder function. No recent imaging. Physical Exam:   WDWN, no apparent distress   Vitals Stable   A&O x 3   FC x 4 ext   Pupils equal in size   EOMI   5/5 in UE bilaterally   5/5 in LE bilaterally   Sensation to light touch intact bilaterally   Gait steady    Imaging: None     Assessment:  Lumbar stenosis and scoliosis (L2-L5)  Kyphosis    Plan:  Thoracic XR  Referral to Orthotics for custom back brace  Patient to call if lower back pain and/or leg symptoms worsen as before--we will obtain an updated MRI for surgical options that are previously known   Call with questions or concerns, RTC PRN     The patient is diagnosed with Primary and secondary diagnoses kyphosis. Patient is in need of a custom fit TLSO spinal orthosis to accommodate abnormal curvature of the spine and or body habitus, provide external stabilization, reduce pain by restricting motion in the sagittal, coronal and transverse planes of motion and facilitate healing. This will significantly reduce further risk of trauma to the spine and enable patient to be upright and participate in the rehabilitation process.   Ultimately the patient

## 2023-08-25 NOTE — PROGRESS NOTES
Alisha Rivas is a 74 y.o. female  . Subjective:      Diagnosed with questionable nondisplaced distal humeral fracture. Still having a lot of pain. We will repeat x-ray. We will provide some pain medication. We will set up with Ortho. Patient have a low threshold for returning to ER if worsens. Wearing sling has caused neck pain and thoracic spasm. Will provide muscle relaxer for this. We will have her continue to wear the sling for the time being until further evaluation. Is due for repeat blood work as well. Although she is not here for this we will set her up for the repeat blood work so she can have done before her follow-up appointment with me. Patient has appointment neurosurgery set up for back pain. Review of Systems   Constitutional:  Negative for activity change, appetite change, chills, diaphoresis, fatigue, fever and unexpected weight change. HENT:  Negative for congestion, dental problem, drooling, ear discharge, ear pain, facial swelling, hearing loss, mouth sores, nosebleeds, postnasal drip, rhinorrhea, sinus pressure, sneezing, sore throat, tinnitus, trouble swallowing and voice change. Eyes:  Negative for photophobia, pain, discharge, redness, itching and visual disturbance. Respiratory:  Negative for apnea, cough, choking, chest tightness, shortness of breath, wheezing and stridor. Cardiovascular:  Negative for chest pain, palpitations and leg swelling. Gastrointestinal:  Negative for abdominal distention, abdominal pain, anal bleeding, blood in stool, constipation, diarrhea, nausea, rectal pain and vomiting. Endocrine: Negative for cold intolerance, heat intolerance, polydipsia, polyphagia and polyuria. Genitourinary:  Negative for decreased urine volume, difficulty urinating, dyspareunia, dysuria, enuresis, flank pain, frequency, genital sores, hematuria, menstrual problem, pelvic pain, urgency, vaginal bleeding, vaginal discharge and vaginal pain.

## 2023-08-27 DIAGNOSIS — E78.2 MIXED HYPERLIPIDEMIA: ICD-10-CM

## 2023-08-27 DIAGNOSIS — E11.9 TYPE 2 DIABETES MELLITUS WITHOUT COMPLICATION, WITHOUT LONG-TERM CURRENT USE OF INSULIN (HCC): ICD-10-CM

## 2023-08-28 RX ORDER — METFORMIN HYDROCHLORIDE 500 MG/1
TABLET, EXTENDED RELEASE ORAL
Qty: 180 TABLET | Refills: 1 | Status: SHIPPED | OUTPATIENT
Start: 2023-08-28

## 2023-08-28 RX ORDER — GABAPENTIN 300 MG/1
CAPSULE ORAL
Qty: 270 CAPSULE | Refills: 3 | Status: SHIPPED | OUTPATIENT
Start: 2023-08-28 | End: 2024-08-28

## 2023-08-28 RX ORDER — PRAVASTATIN SODIUM 20 MG
TABLET ORAL
Qty: 90 TABLET | Refills: 1 | Status: SHIPPED | OUTPATIENT
Start: 2023-08-28

## 2023-08-28 NOTE — TELEPHONE ENCOUNTER
Last Appointment:  5/3/2023  Future Appointments   Date Time Provider [address]   9/7/2023  9:20 AM DO Any León [facility]   10/24/2023 12:45 PM DO Gurmeet Wyatt [facility]

## 2023-09-07 ENCOUNTER — OFFICE VISIT (OUTPATIENT)
Dept: ORTHOPEDIC SURGERY | Age: 73
End: 2023-09-07
Payer: COMMERCIAL

## 2023-09-07 ENCOUNTER — TELEPHONE (OUTPATIENT)
Dept: NEUROSURGERY | Age: 73
End: 2023-09-07

## 2023-09-07 VITALS — HEIGHT: 62 IN | WEIGHT: 195 LBS | TEMPERATURE: 98 F | BODY MASS INDEX: 35.88 KG/M2

## 2023-09-07 DIAGNOSIS — M25.812 SHOULDER IMPINGEMENT, LEFT: ICD-10-CM

## 2023-09-07 DIAGNOSIS — M19.022 PRIMARY OSTEOARTHRITIS OF LEFT ELBOW: ICD-10-CM

## 2023-09-07 DIAGNOSIS — E03.9 ACQUIRED HYPOTHYROIDISM: ICD-10-CM

## 2023-09-07 DIAGNOSIS — E11.9 TYPE 2 DIABETES MELLITUS WITHOUT COMPLICATION, WITHOUT LONG-TERM CURRENT USE OF INSULIN (HCC): ICD-10-CM

## 2023-09-07 DIAGNOSIS — S40.022A CONTUSION OF MULTIPLE SITES OF LEFT SHOULDER AND UPPER ARM, INITIAL ENCOUNTER: Primary | ICD-10-CM

## 2023-09-07 DIAGNOSIS — S40.012A CONTUSION OF MULTIPLE SITES OF LEFT SHOULDER AND UPPER ARM, INITIAL ENCOUNTER: Primary | ICD-10-CM

## 2023-09-07 DIAGNOSIS — R53.82 CHRONIC FATIGUE: ICD-10-CM

## 2023-09-07 DIAGNOSIS — E53.8 VITAMIN B 12 DEFICIENCY: ICD-10-CM

## 2023-09-07 LAB
ABSOLUTE IMMATURE GRANULOCYTE: <0.03 K/UL (ref 0–0.58)
ALBUMIN SERPL-MCNC: 3.9 G/DL (ref 3.5–5.2)
ALP BLD-CCNC: 57 U/L (ref 35–104)
ALT SERPL-CCNC: 14 U/L (ref 0–32)
ANION GAP SERPL CALCULATED.3IONS-SCNC: 16 MMOL/L (ref 7–16)
AST SERPL-CCNC: 23 U/L (ref 0–31)
BASOPHILS ABSOLUTE: 0.05 K/UL (ref 0–0.2)
BASOPHILS RELATIVE PERCENT: 1 % (ref 0–2)
BILIRUB SERPL-MCNC: 0.3 MG/DL (ref 0–1.2)
BUN BLDV-MCNC: 22 MG/DL (ref 6–23)
CALCIUM SERPL-MCNC: 9.3 MG/DL (ref 8.6–10.2)
CHLORIDE BLD-SCNC: 101 MMOL/L (ref 98–107)
CO2: 24 MMOL/L (ref 22–29)
CREAT SERPL-MCNC: 0.8 MG/DL (ref 0.5–1)
CREATININE URINE: 93.4 MG/DL (ref 29–226)
EOSINOPHILS ABSOLUTE: 0.12 K/UL (ref 0.05–0.5)
EOSINOPHILS RELATIVE PERCENT: 3 % (ref 0–6)
FOLATE: >20 NG/ML (ref 4.8–24.2)
GFR SERPL CREATININE-BSD FRML MDRD: >60 ML/MIN/1.73M2
GLUCOSE BLD-MCNC: 120 MG/DL (ref 74–99)
HBA1C MFR BLD: 5.7 % (ref 4–5.6)
HCT VFR BLD CALC: 41.6 % (ref 34–48)
HEMOGLOBIN: 13.3 G/DL (ref 11.5–15.5)
IMMATURE GRANULOCYTES: 0 % (ref 0–5)
LYMPHOCYTES ABSOLUTE: 1.38 K/UL (ref 1.5–4)
LYMPHOCYTES RELATIVE PERCENT: 29 % (ref 20–42)
MCH RBC QN AUTO: 31.3 PG (ref 26–35)
MCHC RBC AUTO-ENTMCNC: 32 G/DL (ref 32–34.5)
MCV RBC AUTO: 97.9 FL (ref 80–99.9)
MICROALBUMIN/CREAT 24H UR: 31 MG/L (ref 0–19)
MICROALBUMIN/CREAT UR-RTO: 33 MCG/MG CREAT (ref 0–30)
MONOCYTES ABSOLUTE: 0.43 K/UL (ref 0.1–0.95)
MONOCYTES RELATIVE PERCENT: 9 % (ref 2–12)
NEUTROPHILS ABSOLUTE: 2.79 K/UL (ref 1.8–7.3)
NEUTROPHILS RELATIVE PERCENT: 58 % (ref 43–80)
PDW BLD-RTO: 12.9 % (ref 11.5–15)
PLATELET # BLD: 236 K/UL (ref 130–450)
PMV BLD AUTO: 12 FL (ref 7–12)
POTASSIUM SERPL-SCNC: 3.7 MMOL/L (ref 3.5–5)
RBC # BLD: 4.25 M/UL (ref 3.5–5.5)
SODIUM BLD-SCNC: 141 MMOL/L (ref 132–146)
T4 FREE: 1 NG/DL (ref 0.9–1.7)
TOTAL PROTEIN: 7 G/DL (ref 6.4–8.3)
TSH SERPL DL<=0.05 MIU/L-ACNC: 2.41 UIU/ML (ref 0.27–4.2)
VITAMIN B-12: 494 PG/ML (ref 211–946)
WBC # BLD: 4.8 K/UL (ref 4.5–11.5)

## 2023-09-07 PROCEDURE — 99214 OFFICE O/P EST MOD 30 MIN: CPT | Performed by: ORTHOPAEDIC SURGERY

## 2023-09-07 PROCEDURE — 1123F ACP DISCUSS/DSCN MKR DOCD: CPT | Performed by: ORTHOPAEDIC SURGERY

## 2023-09-07 NOTE — PROGRESS NOTES
Chief Complaint   Patient presents with    Shoulder Pain     Left shoulder pain shoots from the shoulder down to the wrist        Nakul may  presents today for evaluation of her left shoulder/elbow pain. The patient states the pain started  1 month ago. The pain is aching in nature. It is worse with moving and better with rest.  The patient does complain of night pain. The patient is right hand dominant. The patient is not working at this time. Her occupation is retired.       Past Medical History:   Diagnosis Date    Arthritis     Asthma     no inhaler, controlled ; hist years ago of asthmatic bronchitis, denies problems since    Depression     Diabetes mellitus ([address])     History of blood transfusion     Hyperlipidemia     Hypertension     Low back pain     Right cataract 2018     Past Surgical History:   Procedure Laterality Date    ABDOMEN SURGERY      CATARACT REMOVAL WITH IMPLANT Right 2018     SECTION      COLONOSCOPY      EYE SURGERY      bilateral cataracts    FRACTURE SURGERY      left foot    JOINT REPLACEMENT      total left knee    KNEE ARTHROPLASTY Left 2013    totoal joint arthroplasty left knee with platelet gel and nerve block    NERVE BLOCK  7 11 12    caudal #1    NERVE BLOCK  7  12    caudal #2    NERVE BLOCK  12    therapeutic caudal with epiduragram    NERVE BLOCK  2018    lumbar epidural injection #1    NERVE BLOCK Right 2018    lumbar medial branch block  #1    POLYPECTOMY      removed from uterus    MA NJX DX/THER AGT PVRT FACET JT LMBR/SAC 1 LEVEL Right 2018    RIGHT LUMBAR L2,3,4,MEDIAL BRANCH BLOCK #1 performed by Kisha Kiran MD at [facility] W/COLUM [address] [place] [address] TIP ONLY N/A 2018    L3-4 LUMBAR EPIDURAL STEROID INJECTION #1 performed by Kisha Kiran MD at [address] [facility]         Current Outpatient Medications:     metFORMIN (GLUCOPHAGE-XR) 500 MG extended release tablet, TAKE 1

## 2023-09-11 ENCOUNTER — TELEPHONE (OUTPATIENT)
Dept: NEUROSURGERY | Age: 73
End: 2023-09-11

## 2023-10-05 DIAGNOSIS — I10 HTN (HYPERTENSION), BENIGN: Chronic | ICD-10-CM

## 2023-10-05 RX ORDER — VALSARTAN AND HYDROCHLOROTHIAZIDE 160; 25 MG/1; MG/1
1 TABLET ORAL DAILY
Qty: 90 TABLET | Refills: 0 | OUTPATIENT
Start: 2023-10-05

## 2023-10-08 RX ORDER — VENLAFAXINE HYDROCHLORIDE 75 MG/1
75 CAPSULE, EXTENDED RELEASE ORAL DAILY
Qty: 90 CAPSULE | Refills: 1 | Status: CANCELLED | OUTPATIENT
Start: 2023-10-08

## 2023-10-09 NOTE — TELEPHONE ENCOUNTER
Last Appointment:  5/3/2023  Future Appointments   Date Time Provider [address]   10/24/2023 12:45 PM Wm Willson [facility]   10/30/2023  1:30 PM Yao Quezada [place]

## 2023-10-11 RX ORDER — VENLAFAXINE HYDROCHLORIDE 75 MG/1
75 CAPSULE, EXTENDED RELEASE ORAL DAILY
Qty: 90 CAPSULE | Refills: 1 | OUTPATIENT
Start: 2023-10-11

## 2023-10-12 RX ORDER — VENLAFAXINE HYDROCHLORIDE 75 MG/1
75 CAPSULE, EXTENDED RELEASE ORAL DAILY
Qty: 90 CAPSULE | Refills: 1 | Status: SHIPPED | OUTPATIENT
Start: 2023-10-12

## 2023-10-13 RX ORDER — VENLAFAXINE HYDROCHLORIDE 75 MG/1
75 CAPSULE, EXTENDED RELEASE ORAL DAILY
Qty: 90 CAPSULE | Refills: 1 | OUTPATIENT
Start: 2023-10-13

## 2023-10-24 ENCOUNTER — OFFICE VISIT (OUTPATIENT)
Dept: FAMILY MEDICINE CLINIC | Age: 73
End: 2023-10-24
Payer: COMMERCIAL

## 2023-10-24 VITALS
WEIGHT: 197 LBS | DIASTOLIC BLOOD PRESSURE: 80 MMHG | SYSTOLIC BLOOD PRESSURE: 126 MMHG | HEART RATE: 73 BPM | BODY MASS INDEX: 36.25 KG/M2 | HEIGHT: 62 IN | RESPIRATION RATE: 18 BRPM | OXYGEN SATURATION: 96 %

## 2023-10-24 DIAGNOSIS — E11.9 TYPE 2 DIABETES MELLITUS WITHOUT COMPLICATION, WITHOUT LONG-TERM CURRENT USE OF INSULIN (HCC): Primary | ICD-10-CM

## 2023-10-24 DIAGNOSIS — E78.2 MIXED HYPERLIPIDEMIA: ICD-10-CM

## 2023-10-24 DIAGNOSIS — F51.01 PRIMARY INSOMNIA: ICD-10-CM

## 2023-10-24 DIAGNOSIS — I10 HTN (HYPERTENSION), BENIGN: Chronic | ICD-10-CM

## 2023-10-24 DIAGNOSIS — E55.9 VITAMIN D DEFICIENCY: ICD-10-CM

## 2023-10-24 DIAGNOSIS — R53.82 CHRONIC FATIGUE: ICD-10-CM

## 2023-10-24 PROCEDURE — 1123F ACP DISCUSS/DSCN MKR DOCD: CPT | Performed by: FAMILY MEDICINE

## 2023-10-24 PROCEDURE — 3044F HG A1C LEVEL LT 7.0%: CPT | Performed by: FAMILY MEDICINE

## 2023-10-24 PROCEDURE — 3074F SYST BP LT 130 MM HG: CPT | Performed by: FAMILY MEDICINE

## 2023-10-24 PROCEDURE — 99214 OFFICE O/P EST MOD 30 MIN: CPT | Performed by: FAMILY MEDICINE

## 2023-10-24 PROCEDURE — 3078F DIAST BP <80 MM HG: CPT | Performed by: FAMILY MEDICINE

## 2023-10-24 RX ORDER — RAMELTEON 8 MG/1
8 TABLET ORAL NIGHTLY
Qty: 90 TABLET | Refills: 3 | Status: SHIPPED | OUTPATIENT
Start: 2023-10-24 | End: 2023-11-23

## 2023-10-24 RX ORDER — VALSARTAN AND HYDROCHLOROTHIAZIDE 160; 25 MG/1; MG/1
1 TABLET ORAL DAILY
Qty: 90 TABLET | Refills: 4 | Status: SHIPPED | OUTPATIENT
Start: 2023-10-24

## 2023-10-24 RX ORDER — AMLODIPINE BESYLATE 5 MG/1
5 TABLET ORAL DAILY
Qty: 90 TABLET | Refills: 3 | Status: SHIPPED | OUTPATIENT
Start: 2023-10-24

## 2023-10-24 NOTE — PROGRESS NOTES
Cheri De La Torre is a 74 y.o. female  . Subjective:      Issue with insomnia. Discussed blood work. We will eliminate the metformin and recheck three months. We can always restart. We discussed starting Jardiance due to cardiovascular benefit. Hopefully will be covered to replace metformin. Encouraged to make appointment with eye doctor. Discussed vaccinations recommendations        Review of Systems   Constitutional:  Negative for activity change, appetite change, chills, diaphoresis, fatigue, fever and unexpected weight change. HENT:  Negative for congestion, dental problem, drooling, ear discharge, ear pain, facial swelling, hearing loss, mouth sores, nosebleeds, postnasal drip, rhinorrhea, sinus pressure, sneezing, sore throat, tinnitus, trouble swallowing and voice change. Eyes:  Negative for photophobia, pain, discharge, redness, itching and visual disturbance. Respiratory:  Negative for apnea, cough, choking, chest tightness, shortness of breath, wheezing and stridor. Cardiovascular:  Negative for chest pain, palpitations and leg swelling. Gastrointestinal:  Negative for abdominal distention, abdominal pain, anal bleeding, blood in stool, constipation, diarrhea, nausea, rectal pain and vomiting. Endocrine: Negative for cold intolerance, heat intolerance, polydipsia, polyphagia and polyuria. Genitourinary:  Negative for decreased urine volume, difficulty urinating, dyspareunia, dysuria, enuresis, flank pain, frequency, genital sores, hematuria, menstrual problem, pelvic pain, urgency, vaginal bleeding, vaginal discharge and vaginal pain. Musculoskeletal:  Negative for arthralgias, back pain, gait problem, joint swelling, myalgias, neck pain and neck stiffness. Skin:  Negative for color change, pallor, rash and wound. Allergic/Immunologic: Negative for environmental allergies, food allergies and immunocompromised state.    Neurological:  Negative for dizziness, tremors, seizures, syncope,

## 2023-10-26 ASSESSMENT — ENCOUNTER SYMPTOMS
FACIAL SWELLING: 0
EYE ITCHING: 0
CHEST TIGHTNESS: 0
APNEA: 0
RHINORRHEA: 0
PHOTOPHOBIA: 0
ABDOMINAL DISTENTION: 0
COLOR CHANGE: 0
EYE REDNESS: 0
CHOKING: 0
BLOOD IN STOOL: 0
BACK PAIN: 0
ABDOMINAL PAIN: 0
SORE THROAT: 0
RECTAL PAIN: 0
WHEEZING: 0
TROUBLE SWALLOWING: 0
SHORTNESS OF BREATH: 0
ANAL BLEEDING: 0
SINUS PRESSURE: 0
NAUSEA: 0
VOICE CHANGE: 0
STRIDOR: 0
VOMITING: 0
COUGH: 0
DIARRHEA: 0
EYE PAIN: 0
EYE DISCHARGE: 0
CONSTIPATION: 0

## 2023-10-27 RX ORDER — GABAPENTIN 300 MG/1
300 CAPSULE ORAL 3 TIMES DAILY
Qty: 270 CAPSULE | Refills: 3 | OUTPATIENT
Start: 2023-10-27 | End: 2024-10-27

## 2023-10-30 ENCOUNTER — OFFICE VISIT (OUTPATIENT)
Dept: ORTHOPEDIC SURGERY | Age: 73
End: 2023-10-30
Payer: COMMERCIAL

## 2023-10-30 VITALS — BODY MASS INDEX: 36.25 KG/M2 | HEIGHT: 62 IN | WEIGHT: 197 LBS | TEMPERATURE: 98 F

## 2023-10-30 DIAGNOSIS — M25.812 SHOULDER IMPINGEMENT, LEFT: Primary | ICD-10-CM

## 2023-10-30 PROCEDURE — 20610 DRAIN/INJ JOINT/BURSA W/O US: CPT | Performed by: ORTHOPAEDIC SURGERY

## 2023-10-30 PROCEDURE — 99213 OFFICE O/P EST LOW 20 MIN: CPT | Performed by: ORTHOPAEDIC SURGERY

## 2023-10-30 PROCEDURE — 1123F ACP DISCUSS/DSCN MKR DOCD: CPT | Performed by: ORTHOPAEDIC SURGERY

## 2023-10-30 NOTE — PROGRESS NOTES
Chief Complaint   Patient presents with    Shoulder Pain     Left Shoulder/Arm F/U, pain from shoulder all the way down to the wrist, with elbow having a lot of pain. Marcos Crooks returns today for follow up of her left shoulder pain. she reports that the pain in the shoulder is worse. she has not been going to physical therapy. The patient is right dominant. The patient's pain level is a 5/10. The patient did not respond to treatment.     Past Medical History:   Diagnosis Date    Arthritis     Asthma     no inhaler, controlled ; hist years ago of asthmatic bronchitis, denies problems since    Depression     Diabetes mellitus ([address])     History of blood transfusion     Hyperlipidemia     Hypertension     Low back pain     Right cataract 2018     Past Surgical History:   Procedure Laterality Date    ABDOMEN SURGERY      CATARACT REMOVAL WITH IMPLANT Right 2018     SECTION      COLONOSCOPY      EYE SURGERY      bilateral cataracts    FRACTURE SURGERY      left foot    JOINT REPLACEMENT      total left knee    KNEE ARTHROPLASTY Left 2013    totoal joint arthroplasty left knee with platelet gel and nerve block    NERVE BLOCK  7 11 12    caudal #1    NERVE BLOCK  7  12    caudal #2    NERVE BLOCK  12    therapeutic caudal with epiduragram    NERVE BLOCK  2018    lumbar epidural injection #1    NERVE BLOCK Right 2018    lumbar medial branch block  #1    POLYPECTOMY      removed from uterus    [place] NJX DX/THER AGT PVRT FACET JT LMBR/SAC 1 LEVEL Right 2018    RIGHT LUMBAR L2,3,4,MEDIAL BRANCH BLOCK #1 performed by Boy Betancourt MD at [address] [facility] W/COLUM [address] [place] [address] TIP ONLY N/A 2018    L3-4 LUMBAR EPIDURAL STEROID INJECTION #1 performed by Boy Betancourt MD at [address] [facility]         Current Outpatient Medications:     valsartan-hydroCHLOROthiazide (DIOVAN-HCT) 160-25 MG per tablet, Take 1 tablet by mouth daily, Disp: 90 tablet,

## 2023-11-01 RX ORDER — TRIAMCINOLONE ACETONIDE 40 MG/ML
40 INJECTION, SUSPENSION INTRA-ARTICULAR; INTRAMUSCULAR ONCE
Status: COMPLETED | OUTPATIENT
Start: 2023-11-01 | End: 2023-11-01

## 2023-11-01 RX ORDER — GABAPENTIN 300 MG/1
300 CAPSULE ORAL 3 TIMES DAILY
Qty: 270 CAPSULE | Refills: 3 | Status: SHIPPED | OUTPATIENT
Start: 2023-11-01 | End: 2024-11-01

## 2023-11-01 RX ADMIN — TRIAMCINOLONE ACETONIDE 40 MG: 40 INJECTION, SUSPENSION INTRA-ARTICULAR; INTRAMUSCULAR at 14:32

## 2023-11-01 NOTE — TELEPHONE ENCOUNTER
Requested Prescriptions     Pending Prescriptions Disp Refills    gabapentin (NEURONTIN) 300 MG capsule 270 capsule 3     Sig: Take 1 capsule by mouth 3 times daily.     Last Appointment:  5/3/2023  Future Appointments   Date Time Provider [address]   1/30/2024  1:00 PM [facility]

## 2023-11-07 ENCOUNTER — OFFICE VISIT (OUTPATIENT)
Dept: FAMILY MEDICINE CLINIC | Age: 73
End: 2023-11-07
Payer: COMMERCIAL

## 2023-11-07 VITALS
RESPIRATION RATE: 18 BRPM | DIASTOLIC BLOOD PRESSURE: 76 MMHG | SYSTOLIC BLOOD PRESSURE: 124 MMHG | BODY MASS INDEX: 35.33 KG/M2 | HEIGHT: 62 IN | WEIGHT: 192 LBS | OXYGEN SATURATION: 96 % | HEART RATE: 63 BPM

## 2023-11-07 DIAGNOSIS — J04.0 LARYNGITIS: Primary | ICD-10-CM

## 2023-11-07 DIAGNOSIS — J01.00 ACUTE NON-RECURRENT MAXILLARY SINUSITIS: ICD-10-CM

## 2023-11-07 PROCEDURE — 99213 OFFICE O/P EST LOW 20 MIN: CPT | Performed by: FAMILY MEDICINE

## 2023-11-07 PROCEDURE — 1123F ACP DISCUSS/DSCN MKR DOCD: CPT | Performed by: FAMILY MEDICINE

## 2023-11-07 PROCEDURE — 3078F DIAST BP <80 MM HG: CPT | Performed by: FAMILY MEDICINE

## 2023-11-07 PROCEDURE — 3074F SYST BP LT 130 MM HG: CPT | Performed by: FAMILY MEDICINE

## 2023-11-07 RX ORDER — AZITHROMYCIN 250 MG/1
250 TABLET, FILM COATED ORAL SEE ADMIN INSTRUCTIONS
Qty: 6 TABLET | Refills: 0 | Status: SHIPPED | OUTPATIENT
Start: 2023-11-07 | End: 2023-11-12

## 2023-11-07 RX ORDER — IPRATROPIUM BROMIDE 42 UG/1
2 SPRAY, METERED NASAL 4 TIMES DAILY
Qty: 15 ML | Refills: 3 | Status: SHIPPED | OUTPATIENT
Start: 2023-11-07

## 2023-11-07 RX ORDER — PREDNISONE 20 MG/1
20 TABLET ORAL DAILY
Qty: 5 TABLET | Refills: 0 | Status: SHIPPED | OUTPATIENT
Start: 2023-11-07 | End: 2023-11-12

## 2023-11-07 RX ORDER — BROMPHENIRAMINE MALEATE, PSEUDOEPHEDRINE HYDROCHLORIDE, AND DEXTROMETHORPHAN HYDROBROMIDE 2; 30; 10 MG/5ML; MG/5ML; MG/5ML
5 SYRUP ORAL 4 TIMES DAILY PRN
Qty: 118 ML | Refills: 0 | Status: SHIPPED | OUTPATIENT
Start: 2023-11-07

## 2023-11-07 NOTE — PROGRESS NOTES
(Medical): Not on file     Lack of Transportation (Non-Medical): No   Physical Activity: Insufficiently Active (7/15/2023)    Exercise Vital Sign     Days of Exercise per Week: 4 days     Minutes of Exercise per Session: 30 min   Stress: Not on file   Social Connections: Not on file   Intimate Partner Violence: Not At Risk (7/15/2023)    Humiliation, Afraid, Rape, and Kick questionnaire     Fear of Current or Ex-Partner: No     Emotionally Abused: No     Physically Abused: No     Sexually Abused: No   Housing Stability: Unknown (5/3/2023)    Housing Stability Vital Sign     Unable to Pay for Housing in the Last Year: Not on file     Number of [address] in the Last Year: Not on file     Unstable Housing in the Last Year: No       Family History   Problem Relation Age of Onset    Diabetes Mother     Mental Illness Mother     Stroke Father     High Blood Pressure Father     Prostate Cancer Father 80        Per mammo hx sheet    Cancer Other     Diabetes Other     Heart Disease Other     Hypertension Other        Current Outpatient Medications on File Prior to Visit   Medication Sig Dispense Refill    gabapentin (NEURONTIN) 300 MG capsule Take 1 capsule by mouth 3 times daily. 270 capsule 3    valsartan-hydroCHLOROthiazide (DIOVAN-HCT) 160-25 MG per tablet Take 1 tablet by mouth daily 90 tablet 4    ramelteon (ROZEREM) 8 MG tablet Take 1 tablet by mouth nightly 90 tablet 3    amLODIPine (NORVASC) 5 MG tablet Take 1 tablet by mouth daily 90 tablet 3    empagliflozin (JARDIANCE) 10 MG tablet Take 1 tablet by mouth daily 90 tablet 3    venlafaxine (EFFEXOR XR) 75 MG extended release capsule Take 1 capsule by mouth daily 90 capsule 1    pravastatin (PRAVACHOL) 20 MG tablet TAKE 1 TABLET NIGHTLY 90 tablet 1    Blood Glucose Monitoring Suppl (ONE TOUCH ULTRA 2) w/Device KIT 1 kit by Does not apply route daily 1 kit 0    blood glucose monitor strips Test 1 times a day & as needed for symptoms of irregular blood glucose.

## 2023-11-10 ASSESSMENT — ENCOUNTER SYMPTOMS
WHEEZING: 0
APNEA: 0
EYE REDNESS: 0
ABDOMINAL DISTENTION: 0
VOMITING: 0
SINUS PAIN: 1
STRIDOR: 0
RHINORRHEA: 1
NAUSEA: 0
DIARRHEA: 0
COUGH: 1
PHOTOPHOBIA: 0
RECTAL PAIN: 0
TROUBLE SWALLOWING: 0
CHOKING: 0
SHORTNESS OF BREATH: 0
BACK PAIN: 0
BLOOD IN STOOL: 0
COLOR CHANGE: 0
FACIAL SWELLING: 0
SINUS PRESSURE: 1
CHEST TIGHTNESS: 0
ANAL BLEEDING: 0
ABDOMINAL PAIN: 0
EYE PAIN: 0
CONSTIPATION: 0
SORE THROAT: 0
EYE ITCHING: 0
EYE DISCHARGE: 0
VOICE CHANGE: 0

## 2023-12-14 RX ORDER — DICYCLOMINE HYDROCHLORIDE 10 MG/1
10 CAPSULE ORAL 3 TIMES DAILY
Qty: 90 CAPSULE | Refills: 1 | Status: SHIPPED | OUTPATIENT
Start: 2023-12-14

## 2024-01-23 DIAGNOSIS — E55.9 VITAMIN D DEFICIENCY: ICD-10-CM

## 2024-01-23 DIAGNOSIS — E78.2 MIXED HYPERLIPIDEMIA: ICD-10-CM

## 2024-01-23 DIAGNOSIS — E11.9 TYPE 2 DIABETES MELLITUS WITHOUT COMPLICATION, WITHOUT LONG-TERM CURRENT USE OF INSULIN (HCC): ICD-10-CM

## 2024-01-23 DIAGNOSIS — R53.82 CHRONIC FATIGUE: ICD-10-CM

## 2024-01-23 LAB
ABSOLUTE IMMATURE GRANULOCYTE: <0.03 K/UL (ref 0–0.58)
ALBUMIN SERPL-MCNC: 3.8 G/DL (ref 3.5–5.2)
ALP BLD-CCNC: 56 U/L (ref 35–104)
ALT SERPL-CCNC: 16 U/L (ref 0–32)
ANION GAP SERPL CALCULATED.3IONS-SCNC: 11 MMOL/L (ref 7–16)
AST SERPL-CCNC: 26 U/L (ref 0–31)
BASOPHILS ABSOLUTE: 0.04 K/UL (ref 0–0.2)
BASOPHILS RELATIVE PERCENT: 1 % (ref 0–2)
BILIRUB SERPL-MCNC: 0.4 MG/DL (ref 0–1.2)
BUN BLDV-MCNC: 23 MG/DL (ref 6–23)
CALCIUM SERPL-MCNC: 9.5 MG/DL (ref 8.6–10.2)
CHLORIDE BLD-SCNC: 103 MMOL/L (ref 98–107)
CHOLESTEROL: 176 MG/DL
CO2: 29 MMOL/L (ref 22–29)
CREAT SERPL-MCNC: 1 MG/DL (ref 0.5–1)
EOSINOPHILS ABSOLUTE: 0.09 K/UL (ref 0.05–0.5)
EOSINOPHILS RELATIVE PERCENT: 2 % (ref 0–6)
GFR SERPL CREATININE-BSD FRML MDRD: >60 ML/MIN/1.73M2
GLUCOSE BLD-MCNC: 111 MG/DL (ref 74–99)
HBA1C MFR BLD: 5.9 % (ref 4–5.6)
HCT VFR BLD CALC: 45.4 % (ref 34–48)
HDLC SERPL-MCNC: 65 MG/DL
HEMOGLOBIN: 14.2 G/DL (ref 11.5–15.5)
IMMATURE GRANULOCYTES: 0 % (ref 0–5)
LDL CHOLESTEROL: 95 MG/DL
LYMPHOCYTES ABSOLUTE: 1.42 K/UL (ref 1.5–4)
LYMPHOCYTES RELATIVE PERCENT: 32 % (ref 20–42)
MCH RBC QN AUTO: 30.6 PG (ref 26–35)
MCHC RBC AUTO-ENTMCNC: 31.3 G/DL (ref 32–34.5)
MCV RBC AUTO: 97.8 FL (ref 80–99.9)
MONOCYTES ABSOLUTE: 0.4 K/UL (ref 0.1–0.95)
MONOCYTES RELATIVE PERCENT: 9 % (ref 2–12)
NEUTROPHILS ABSOLUTE: 2.51 K/UL (ref 1.8–7.3)
NEUTROPHILS RELATIVE PERCENT: 56 % (ref 43–80)
PDW BLD-RTO: 13 % (ref 11.5–15)
PLATELET # BLD: 234 K/UL (ref 130–450)
PMV BLD AUTO: 11.9 FL (ref 7–12)
POTASSIUM SERPL-SCNC: 3.9 MMOL/L (ref 3.5–5)
RBC # BLD: 4.64 M/UL (ref 3.5–5.5)
SODIUM BLD-SCNC: 143 MMOL/L (ref 132–146)
TOTAL PROTEIN: 6.6 G/DL (ref 6.4–8.3)
TRIGL SERPL-MCNC: 79 MG/DL
TSH SERPL DL<=0.05 MIU/L-ACNC: 2.32 UIU/ML (ref 0.27–4.2)
VITAMIN D 25-HYDROXY: 76.7 NG/ML (ref 30–100)
VLDLC SERPL CALC-MCNC: 16 MG/DL
WBC # BLD: 4.5 K/UL (ref 4.5–11.5)

## 2024-01-30 ENCOUNTER — OFFICE VISIT (OUTPATIENT)
Dept: FAMILY MEDICINE CLINIC | Age: 74
End: 2024-01-30
Payer: MEDICARE

## 2024-01-30 VITALS
OXYGEN SATURATION: 96 % | WEIGHT: 198 LBS | SYSTOLIC BLOOD PRESSURE: 118 MMHG | HEIGHT: 62 IN | BODY MASS INDEX: 36.44 KG/M2 | HEART RATE: 59 BPM | RESPIRATION RATE: 18 BRPM | DIASTOLIC BLOOD PRESSURE: 76 MMHG

## 2024-01-30 DIAGNOSIS — R10.9 FLANK PAIN: Primary | ICD-10-CM

## 2024-01-30 DIAGNOSIS — N30.90 CYSTITIS: ICD-10-CM

## 2024-01-30 PROCEDURE — G8427 DOCREV CUR MEDS BY ELIG CLIN: HCPCS | Performed by: FAMILY MEDICINE

## 2024-01-30 PROCEDURE — 99213 OFFICE O/P EST LOW 20 MIN: CPT | Performed by: FAMILY MEDICINE

## 2024-01-30 PROCEDURE — 3074F SYST BP LT 130 MM HG: CPT | Performed by: FAMILY MEDICINE

## 2024-01-30 PROCEDURE — 1123F ACP DISCUSS/DSCN MKR DOCD: CPT | Performed by: FAMILY MEDICINE

## 2024-01-30 PROCEDURE — 3017F COLORECTAL CA SCREEN DOC REV: CPT | Performed by: FAMILY MEDICINE

## 2024-01-30 PROCEDURE — G8399 PT W/DXA RESULTS DOCUMENT: HCPCS | Performed by: FAMILY MEDICINE

## 2024-01-30 PROCEDURE — G8417 CALC BMI ABV UP PARAM F/U: HCPCS | Performed by: FAMILY MEDICINE

## 2024-01-30 PROCEDURE — G8484 FLU IMMUNIZE NO ADMIN: HCPCS | Performed by: FAMILY MEDICINE

## 2024-01-30 PROCEDURE — 1090F PRES/ABSN URINE INCON ASSESS: CPT | Performed by: FAMILY MEDICINE

## 2024-01-30 PROCEDURE — 3078F DIAST BP <80 MM HG: CPT | Performed by: FAMILY MEDICINE

## 2024-01-30 PROCEDURE — 1036F TOBACCO NON-USER: CPT | Performed by: FAMILY MEDICINE

## 2024-01-30 RX ORDER — VARENICLINE 0.03 MG/.05ML
SPRAY NASAL
COMMUNITY

## 2024-01-30 RX ORDER — SULFAMETHOXAZOLE AND TRIMETHOPRIM 800; 160 MG/1; MG/1
1 TABLET ORAL 2 TIMES DAILY
Qty: 20 TABLET | Refills: 0 | Status: SHIPPED | OUTPATIENT
Start: 2024-01-30 | End: 2024-02-09

## 2024-01-30 RX ORDER — CYCLOSPORINE 0.5 MG/ML
EMULSION OPHTHALMIC
COMMUNITY
Start: 2023-11-25

## 2024-01-30 RX ORDER — PHENAZOPYRIDINE HYDROCHLORIDE 200 MG/1
200 TABLET, FILM COATED ORAL 3 TIMES DAILY
Qty: 9 TABLET | Refills: 0 | Status: SHIPPED | OUTPATIENT
Start: 2024-01-30 | End: 2024-02-02

## 2024-01-30 ASSESSMENT — PATIENT HEALTH QUESTIONNAIRE - PHQ9
SUM OF ALL RESPONSES TO PHQ QUESTIONS 1-9: 0
2. FEELING DOWN, DEPRESSED OR HOPELESS: 0
SUM OF ALL RESPONSES TO PHQ QUESTIONS 1-9: 0
SUM OF ALL RESPONSES TO PHQ9 QUESTIONS 1 & 2: 0
SUM OF ALL RESPONSES TO PHQ QUESTIONS 1-9: 0
1. LITTLE INTEREST OR PLEASURE IN DOING THINGS: 0
SUM OF ALL RESPONSES TO PHQ QUESTIONS 1-9: 0

## 2024-01-30 NOTE — PROGRESS NOTES
Yeimi Riggins is a 73 y.o. female  .  Subjective:      Having orthostatic type symptoms.  Will go ahead and have her hold Jardiance and amlodipine for now.  Will probably restart Jardiance.  I have her increase fluids.  Also complaining of constipation and bloating.  Will obtain a KUB today.  Patient have a low threshold for going to ER if symptoms worsen.  We will have her follow-up next week.        Review of Systems   Constitutional:  Negative for activity change, appetite change, chills, diaphoresis, fatigue, fever and unexpected weight change.   HENT:  Negative for congestion, dental problem, drooling, ear discharge, ear pain, facial swelling, hearing loss, mouth sores, nosebleeds, postnasal drip, rhinorrhea, sinus pressure, sneezing, sore throat, tinnitus, trouble swallowing and voice change.    Eyes:  Negative for photophobia, pain, discharge, redness, itching and visual disturbance.   Respiratory:  Negative for apnea, cough, choking, chest tightness, shortness of breath, wheezing and stridor.    Cardiovascular:  Negative for chest pain, palpitations and leg swelling.   Gastrointestinal:  Positive for abdominal distention and constipation. Negative for abdominal pain, anal bleeding, blood in stool, diarrhea, nausea, rectal pain and vomiting.   Endocrine: Negative for cold intolerance, heat intolerance, polydipsia, polyphagia and polyuria.   Genitourinary:  Negative for decreased urine volume, difficulty urinating, dyspareunia, dysuria, enuresis, flank pain, frequency, genital sores, hematuria, menstrual problem, pelvic pain, urgency, vaginal bleeding, vaginal discharge and vaginal pain.   Musculoskeletal:  Negative for arthralgias, back pain, gait problem, joint swelling, myalgias, neck pain and neck stiffness.   Skin:  Negative for color change, pallor, rash and wound.   Allergic/Immunologic: Negative for environmental allergies, food allergies and immunocompromised state.   Neurological:  Positive for

## 2024-02-02 ASSESSMENT — ENCOUNTER SYMPTOMS
PHOTOPHOBIA: 0
SORE THROAT: 0
ABDOMINAL DISTENTION: 1
WHEEZING: 0
STRIDOR: 0
ABDOMINAL PAIN: 0
CONSTIPATION: 1
BLOOD IN STOOL: 0
CHOKING: 0
BACK PAIN: 0
ANAL BLEEDING: 0
FACIAL SWELLING: 0
CHEST TIGHTNESS: 0
SINUS PRESSURE: 0
EYE PAIN: 0
SHORTNESS OF BREATH: 0
RECTAL PAIN: 0
NAUSEA: 0
EYE ITCHING: 0
VOMITING: 0
DIARRHEA: 0
VOICE CHANGE: 0
TROUBLE SWALLOWING: 0
COUGH: 0
EYE DISCHARGE: 0
EYE REDNESS: 0
COLOR CHANGE: 0
RHINORRHEA: 0
APNEA: 0

## 2024-02-06 RX ORDER — LACTULOSE 10 G/15ML
10 SOLUTION ORAL EVERY EVENING
Qty: 237 ML | Refills: 0 | Status: SHIPPED | OUTPATIENT
Start: 2024-02-06 | End: 2024-02-11

## 2024-02-27 ENCOUNTER — OFFICE VISIT (OUTPATIENT)
Dept: FAMILY MEDICINE CLINIC | Age: 74
End: 2024-02-27
Payer: MEDICARE

## 2024-02-27 VITALS
WEIGHT: 201 LBS | OXYGEN SATURATION: 95 % | SYSTOLIC BLOOD PRESSURE: 118 MMHG | HEART RATE: 85 BPM | RESPIRATION RATE: 18 BRPM | BODY MASS INDEX: 36.99 KG/M2 | DIASTOLIC BLOOD PRESSURE: 78 MMHG | HEIGHT: 62 IN

## 2024-02-27 DIAGNOSIS — J30.1 SEASONAL ALLERGIC RHINITIS DUE TO POLLEN: ICD-10-CM

## 2024-02-27 DIAGNOSIS — E11.9 TYPE 2 DIABETES MELLITUS WITHOUT COMPLICATION, WITHOUT LONG-TERM CURRENT USE OF INSULIN (HCC): ICD-10-CM

## 2024-02-27 DIAGNOSIS — R53.82 CHRONIC FATIGUE: ICD-10-CM

## 2024-02-27 DIAGNOSIS — I10 HTN (HYPERTENSION), BENIGN: ICD-10-CM

## 2024-02-27 DIAGNOSIS — G25.0 ESSENTIAL TREMOR: Primary | ICD-10-CM

## 2024-02-27 PROCEDURE — 1123F ACP DISCUSS/DSCN MKR DOCD: CPT | Performed by: FAMILY MEDICINE

## 2024-02-27 PROCEDURE — 1090F PRES/ABSN URINE INCON ASSESS: CPT | Performed by: FAMILY MEDICINE

## 2024-02-27 PROCEDURE — 3078F DIAST BP <80 MM HG: CPT | Performed by: FAMILY MEDICINE

## 2024-02-27 PROCEDURE — G8399 PT W/DXA RESULTS DOCUMENT: HCPCS | Performed by: FAMILY MEDICINE

## 2024-02-27 PROCEDURE — G8417 CALC BMI ABV UP PARAM F/U: HCPCS | Performed by: FAMILY MEDICINE

## 2024-02-27 PROCEDURE — G8484 FLU IMMUNIZE NO ADMIN: HCPCS | Performed by: FAMILY MEDICINE

## 2024-02-27 PROCEDURE — 99214 OFFICE O/P EST MOD 30 MIN: CPT | Performed by: FAMILY MEDICINE

## 2024-02-27 PROCEDURE — 3044F HG A1C LEVEL LT 7.0%: CPT | Performed by: FAMILY MEDICINE

## 2024-02-27 PROCEDURE — 3074F SYST BP LT 130 MM HG: CPT | Performed by: FAMILY MEDICINE

## 2024-02-27 PROCEDURE — G8427 DOCREV CUR MEDS BY ELIG CLIN: HCPCS | Performed by: FAMILY MEDICINE

## 2024-02-27 PROCEDURE — 1036F TOBACCO NON-USER: CPT | Performed by: FAMILY MEDICINE

## 2024-02-27 RX ORDER — PRIMIDONE 50 MG/1
50 TABLET ORAL NIGHTLY
Qty: 30 TABLET | Refills: 3 | Status: SHIPPED | OUTPATIENT
Start: 2024-02-27

## 2024-02-27 RX ORDER — FLUTICASONE PROPIONATE 50 MCG
2 SPRAY, SUSPENSION (ML) NASAL DAILY
Qty: 48 G | Refills: 4 | Status: SHIPPED | OUTPATIENT
Start: 2024-02-27

## 2024-02-27 RX ORDER — VALSARTAN 80 MG/1
80 TABLET ORAL DAILY
Qty: 90 TABLET | Refills: 3 | Status: SHIPPED | OUTPATIENT
Start: 2024-02-27

## 2024-02-27 NOTE — PROGRESS NOTES
Yeimi Riggins is a 74 y.o. female  .  Subjective:      Doing better since starting stool softer. 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 Discussed treatments discussed propranolol. Would not be a good option because already has occasional orthostatic symptoms. Will lower dosage of valsartan. Will stop bentyl.         Review of Systems   Constitutional:  Negative for activity change, appetite change, chills, diaphoresis, fatigue, fever and unexpected weight change.   HENT:  Negative for congestion, dental problem, drooling, ear discharge, ear pain, facial swelling, hearing loss, mouth sores, nosebleeds, postnasal drip, rhinorrhea, sinus pressure, sneezing, sore throat, tinnitus, trouble swallowing and voice change.    Eyes:  Negative for photophobia, pain, discharge, redness, itching and visual disturbance.   Respiratory:  Negative for apnea, cough, choking, chest tightness, shortness of breath, wheezing and stridor.    Cardiovascular:  Negative for chest pain, palpitations and leg swelling.   Gastrointestinal:  Negative for abdominal distention, abdominal pain, anal bleeding, blood in stool, constipation, diarrhea, nausea, rectal pain and vomiting.   Endocrine: Negative for cold intolerance, heat intolerance, polydipsia, polyphagia and polyuria.   Genitourinary:  Negative for decreased urine volume, difficulty urinating, dyspareunia, dysuria, enuresis, flank pain, frequency, genital sores, hematuria, menstrual problem, pelvic pain, urgency, vaginal bleeding, vaginal discharge and vaginal pain.   Musculoskeletal:  Negative for arthralgias, back pain, gait problem, joint swelling, myalgias, neck pain and neck stiffness.   Skin:  Negative for color change, pallor, rash and wound.   Allergic/Immunologic: Negative for environmental allergies, food allergies and immunocompromised state.   Neurological:  Negative for dizziness, tremors, seizures, syncope, facial asymmetry, speech difficulty, weakness,

## 2024-03-02 ASSESSMENT — ENCOUNTER SYMPTOMS
EYE PAIN: 0
SINUS PRESSURE: 0
RECTAL PAIN: 0
WHEEZING: 0
EYE REDNESS: 0
SHORTNESS OF BREATH: 0
COLOR CHANGE: 0
VOICE CHANGE: 0
CONSTIPATION: 0
STRIDOR: 0
ABDOMINAL DISTENTION: 0
CHEST TIGHTNESS: 0
COUGH: 0
DIARRHEA: 0
VOMITING: 0
ANAL BLEEDING: 0
RHINORRHEA: 0
TROUBLE SWALLOWING: 0
APNEA: 0
BACK PAIN: 0
EYE DISCHARGE: 0
EYE ITCHING: 0
CHOKING: 0
ABDOMINAL PAIN: 0
FACIAL SWELLING: 0
NAUSEA: 0
PHOTOPHOBIA: 0
SORE THROAT: 0
BLOOD IN STOOL: 0

## 2024-03-04 DIAGNOSIS — Z12.31 ENCOUNTER FOR SCREENING MAMMOGRAM FOR MALIGNANT NEOPLASM OF BREAST: Primary | ICD-10-CM

## 2024-03-14 RX ORDER — DICYCLOMINE HYDROCHLORIDE 10 MG/1
10 CAPSULE ORAL 3 TIMES DAILY
Qty: 90 CAPSULE | Refills: 3 | Status: SHIPPED | OUTPATIENT
Start: 2024-03-14

## 2024-03-27 ENCOUNTER — HOSPITAL ENCOUNTER (OUTPATIENT)
Dept: MAMMOGRAPHY | Age: 74
Discharge: HOME | End: 2024-03-29
Payer: MEDICARE

## 2024-03-27 DIAGNOSIS — Z12.31 ENCOUNTER FOR SCREENING MAMMOGRAM FOR MALIGNANT NEOPLASM OF BREAST: ICD-10-CM

## 2024-03-27 PROCEDURE — 77063 BREAST TOMOSYNTHESIS BI: CPT

## 2024-04-08 RX ORDER — VENLAFAXINE HYDROCHLORIDE 75 MG/1
75 CAPSULE, EXTENDED RELEASE ORAL DAILY
Qty: 90 CAPSULE | Refills: 3 | Status: SHIPPED | OUTPATIENT
Start: 2024-04-08

## 2024-04-08 RX ORDER — GABAPENTIN 300 MG/1
300 CAPSULE ORAL 3 TIMES DAILY
Qty: 270 CAPSULE | Refills: 3 | OUTPATIENT
Start: 2024-04-08 | End: 2025-04-09

## 2024-04-08 RX ORDER — VENLAFAXINE HYDROCHLORIDE 75 MG/1
75 CAPSULE, EXTENDED RELEASE ORAL DAILY
Qty: 90 CAPSULE | Refills: 0 | Status: SHIPPED | OUTPATIENT
Start: 2024-04-08

## 2024-04-11 RX ORDER — GABAPENTIN 300 MG/1
300 CAPSULE ORAL 3 TIMES DAILY
Qty: 270 CAPSULE | Refills: 3 | OUTPATIENT
Start: 2024-04-11 | End: 2025-04-12

## 2024-04-17 RX ORDER — GABAPENTIN 300 MG/1
300 CAPSULE ORAL 3 TIMES DAILY
Qty: 270 CAPSULE | Refills: 3 | OUTPATIENT
Start: 2024-04-17 | End: 2025-04-18

## 2024-04-18 RX ORDER — GABAPENTIN 300 MG/1
300 CAPSULE ORAL 3 TIMES DAILY
Qty: 270 CAPSULE | Refills: 3 | Status: SHIPPED | OUTPATIENT
Start: 2024-04-18 | End: 2025-04-19

## 2024-04-24 DIAGNOSIS — E78.2 MIXED HYPERLIPIDEMIA: ICD-10-CM

## 2024-04-24 RX ORDER — PRAVASTATIN SODIUM 20 MG
20 TABLET ORAL NIGHTLY
Qty: 90 TABLET | Refills: 4 | Status: SHIPPED | OUTPATIENT
Start: 2024-04-24

## 2024-05-03 SDOH — ECONOMIC STABILITY: INCOME INSECURITY: HOW HARD IS IT FOR YOU TO PAY FOR THE VERY BASICS LIKE FOOD, HOUSING, MEDICAL CARE, AND HEATING?: NOT VERY HARD

## 2024-05-03 SDOH — ECONOMIC STABILITY: FOOD INSECURITY: WITHIN THE PAST 12 MONTHS, THE FOOD YOU BOUGHT JUST DIDN'T LAST AND YOU DIDN'T HAVE MONEY TO GET MORE.: NEVER TRUE

## 2024-05-03 SDOH — HEALTH STABILITY: PHYSICAL HEALTH: ON AVERAGE, HOW MANY DAYS PER WEEK DO YOU ENGAGE IN MODERATE TO STRENUOUS EXERCISE (LIKE A BRISK WALK)?: 0 DAYS

## 2024-05-03 SDOH — ECONOMIC STABILITY: FOOD INSECURITY: WITHIN THE PAST 12 MONTHS, YOU WORRIED THAT YOUR FOOD WOULD RUN OUT BEFORE YOU GOT MONEY TO BUY MORE.: NEVER TRUE

## 2024-05-03 ASSESSMENT — LIFESTYLE VARIABLES
HOW OFTEN DO YOU HAVE SIX OR MORE DRINKS ON ONE OCCASION: 1
HOW OFTEN DO YOU HAVE A DRINK CONTAINING ALCOHOL: 1
HOW OFTEN DO YOU HAVE A DRINK CONTAINING ALCOHOL: NEVER
HOW MANY STANDARD DRINKS CONTAINING ALCOHOL DO YOU HAVE ON A TYPICAL DAY: PATIENT DOES NOT DRINK
HOW MANY STANDARD DRINKS CONTAINING ALCOHOL DO YOU HAVE ON A TYPICAL DAY: 0

## 2024-05-03 ASSESSMENT — PATIENT HEALTH QUESTIONNAIRE - PHQ9
SUM OF ALL RESPONSES TO PHQ QUESTIONS 1-9: 0
SUM OF ALL RESPONSES TO PHQ9 QUESTIONS 1 & 2: 0
SUM OF ALL RESPONSES TO PHQ QUESTIONS 1-9: 0
2. FEELING DOWN, DEPRESSED OR HOPELESS: NOT AT ALL
SUM OF ALL RESPONSES TO PHQ QUESTIONS 1-9: 0
SUM OF ALL RESPONSES TO PHQ QUESTIONS 1-9: 0
1. LITTLE INTEREST OR PLEASURE IN DOING THINGS: NOT AT ALL

## 2024-05-06 ENCOUNTER — OFFICE VISIT (OUTPATIENT)
Dept: FAMILY MEDICINE CLINIC | Age: 74
End: 2024-05-06
Payer: MEDICARE

## 2024-05-06 VITALS
HEART RATE: 65 BPM | WEIGHT: 204 LBS | DIASTOLIC BLOOD PRESSURE: 64 MMHG | RESPIRATION RATE: 18 BRPM | HEIGHT: 62 IN | BODY MASS INDEX: 37.54 KG/M2 | SYSTOLIC BLOOD PRESSURE: 120 MMHG | OXYGEN SATURATION: 95 % | TEMPERATURE: 97.2 F

## 2024-05-06 DIAGNOSIS — N30.00 ACUTE CYSTITIS WITHOUT HEMATURIA: ICD-10-CM

## 2024-05-06 DIAGNOSIS — E11.42 TYPE 2 DIABETES MELLITUS WITH DIABETIC POLYNEUROPATHY, WITHOUT LONG-TERM CURRENT USE OF INSULIN (HCC): Primary | ICD-10-CM

## 2024-05-06 DIAGNOSIS — G25.0 ESSENTIAL TREMOR: ICD-10-CM

## 2024-05-06 DIAGNOSIS — Z00.00 MEDICARE ANNUAL WELLNESS VISIT, SUBSEQUENT: ICD-10-CM

## 2024-05-06 PROCEDURE — 1123F ACP DISCUSS/DSCN MKR DOCD: CPT | Performed by: FAMILY MEDICINE

## 2024-05-06 PROCEDURE — 3078F DIAST BP <80 MM HG: CPT | Performed by: FAMILY MEDICINE

## 2024-05-06 PROCEDURE — G0439 PPPS, SUBSEQ VISIT: HCPCS | Performed by: FAMILY MEDICINE

## 2024-05-06 PROCEDURE — 3074F SYST BP LT 130 MM HG: CPT | Performed by: FAMILY MEDICINE

## 2024-05-06 PROCEDURE — 3044F HG A1C LEVEL LT 7.0%: CPT | Performed by: FAMILY MEDICINE

## 2024-05-06 RX ORDER — AMLODIPINE BESYLATE 5 MG/1
5 TABLET ORAL DAILY
COMMUNITY
Start: 2024-04-24

## 2024-05-06 RX ORDER — RAMELTEON 8 MG/1
8 TABLET ORAL NIGHTLY
Qty: 90 TABLET | Refills: 3 | Status: SHIPPED | OUTPATIENT
Start: 2024-05-06

## 2024-05-06 RX ORDER — EMPAGLIFLOZIN 10 MG/1
10 TABLET, FILM COATED ORAL DAILY
Qty: 90 TABLET | Refills: 3 | Status: SHIPPED | OUTPATIENT
Start: 2024-05-06

## 2024-05-06 RX ORDER — PRIMIDONE 50 MG/1
50 TABLET ORAL NIGHTLY
Qty: 90 TABLET | Refills: 3 | Status: SHIPPED | OUTPATIENT
Start: 2024-05-06

## 2024-05-06 RX ORDER — NITROFURANTOIN 25; 75 MG/1; MG/1
100 CAPSULE ORAL 2 TIMES DAILY
Qty: 20 CAPSULE | Refills: 0 | Status: SHIPPED | OUTPATIENT
Start: 2024-05-06 | End: 2024-05-16

## 2024-05-06 RX ORDER — GABAPENTIN 300 MG/1
CAPSULE ORAL
Qty: 270 CAPSULE | Refills: 0 | Status: SHIPPED | OUTPATIENT
Start: 2024-05-06 | End: 2024-05-06

## 2024-05-06 RX ORDER — EMPAGLIFLOZIN 10 MG/1
10 TABLET, FILM COATED ORAL DAILY
COMMUNITY
Start: 2024-04-17 | End: 2024-05-06 | Stop reason: SDUPTHER

## 2024-05-06 RX ORDER — RAMELTEON 8 MG/1
8 TABLET ORAL NIGHTLY
COMMUNITY
Start: 2024-04-23 | End: 2024-05-06 | Stop reason: SDUPTHER

## 2024-05-06 RX ORDER — FLUCONAZOLE 100 MG/1
100 TABLET ORAL DAILY
Qty: 7 TABLET | Refills: 0 | Status: SHIPPED | OUTPATIENT
Start: 2024-05-06 | End: 2024-05-13

## 2024-05-08 LAB
CULTURE: NORMAL
CULTURE: NORMAL
SPECIMEN DESCRIPTION: NORMAL

## 2024-05-08 NOTE — PROGRESS NOTES
MD Nando   Multiple Vitamin (MULTIVITAMIN PO) Take 1 tablet by mouth daily LD 01/06 Yes Provider, MD Nando   Calcium Carbonate-Vit D-Min (CALCIUM 1200 PO) Take 600 mg by mouth 2 times daily LD 01/06 Yes Provider, MD Tamika Collier (Including outside providers/suppliers regularly involved in providing care):   Patient Care Team:  Tra Renteria DO as PCP - General (Family Medicine)  Tra Renteria DO as PCP - Empaneled Provider     Reviewed and updated this visit:  Tobacco  Allergies  Meds  Problems  Med Hx  Surg Hx  Soc Hx  Fam Hx             reviewed health maintenance report. Patient is aware of deficiencies and suggested preventative tests.

## 2024-05-08 NOTE — PATIENT INSTRUCTIONS
or do other activities. Bit by bit, increase the time you're active every day. Try for at least 30 minutes on most days of the week.     Try to quit or cut back on using tobacco and other nicotine products. This includes smoking and vaping. If you need help quitting, talk to your doctor about stop-smoking programs and medicines. These can increase your chances of quitting for good. Quitting is one of the most important things you can do to protect your heart. It is never too late to quit. Try to avoid secondhand smoke too.     Stay at a weight that's healthy for you. Talk to your doctor if you need help losing weight.     Try to get 7 to 9 hours of sleep each night.     Limit alcohol to 2 drinks a day for men and 1 drink a day for women. Too much alcohol can cause health problems.     Manage other health problems such as diabetes, high blood pressure, and high cholesterol. If you think you may have a problem with alcohol or drug use, talk to your doctor.   Medicines    Take your medicines exactly as prescribed. Call your doctor if you think you are having a problem with your medicine.     If your doctor recommends aspirin, take the amount directed each day. Make sure you take aspirin and not another kind of pain reliever, such as acetaminophen (Tylenol).   When should you call for help?   Call 911 if you have symptoms of a heart attack. These may include:    Chest pain or pressure, or a strange feeling in the chest.     Sweating.     Shortness of breath.     Pain, pressure, or a strange feeling in the back, neck, jaw, or upper belly or in one or both shoulders or arms.     Lightheadedness or sudden weakness.     A fast or irregular heartbeat.   After you call 911, the  may tell you to chew 1 adult-strength or 2 to 4 low-dose aspirin. Wait for an ambulance. Do not try to drive yourself.  Watch closely for changes in your health, and be sure to contact your doctor if you have any problems.  Where can you learn

## 2024-05-23 LAB — DIABETIC RETINOPATHY: NEGATIVE

## 2024-07-02 RX ORDER — LANCETS 33 GAUGE
EACH MISCELLANEOUS
Refills: 0 | OUTPATIENT
Start: 2024-07-02

## 2024-07-02 RX ORDER — BLOOD SUGAR DIAGNOSTIC
STRIP MISCELLANEOUS
Refills: 0 | OUTPATIENT
Start: 2024-07-02

## 2024-07-04 DIAGNOSIS — I10 HTN (HYPERTENSION), BENIGN: ICD-10-CM

## 2024-07-05 RX ORDER — VALSARTAN 80 MG/1
80 TABLET ORAL DAILY
Qty: 90 TABLET | Refills: 3 | Status: SHIPPED | OUTPATIENT
Start: 2024-07-05

## 2024-07-05 RX ORDER — GABAPENTIN 300 MG/1
CAPSULE ORAL
Qty: 270 CAPSULE | Refills: 3 | Status: SHIPPED | OUTPATIENT
Start: 2024-07-05 | End: 2025-07-17

## 2024-07-05 RX ORDER — VENLAFAXINE HYDROCHLORIDE 75 MG/1
75 CAPSULE, EXTENDED RELEASE ORAL DAILY
Qty: 90 CAPSULE | Refills: 0 | Status: SHIPPED | OUTPATIENT
Start: 2024-07-05

## 2024-07-05 NOTE — TELEPHONE ENCOUNTER
Last seen 5/6/2024  Next appt 8/8/2024    Requested Prescriptions     Pending Prescriptions Disp Refills    valsartan (DIOVAN) 80 MG tablet 90 tablet 3     Sig: Take 1 tablet by mouth daily    venlafaxine (EFFEXOR XR) 75 MG extended release capsule 90 capsule 0     Sig: Take 1 capsule by mouth daily    gabapentin (NEURONTIN) 300 MG capsule 270 capsule 0     Sig: Take two tabs three times a day        
Daughter

## 2024-08-08 ENCOUNTER — OFFICE VISIT (OUTPATIENT)
Dept: FAMILY MEDICINE CLINIC | Age: 74
End: 2024-08-08

## 2024-08-08 VITALS
OXYGEN SATURATION: 96 % | HEIGHT: 62 IN | WEIGHT: 201 LBS | HEART RATE: 65 BPM | DIASTOLIC BLOOD PRESSURE: 74 MMHG | SYSTOLIC BLOOD PRESSURE: 124 MMHG | BODY MASS INDEX: 36.99 KG/M2 | RESPIRATION RATE: 18 BRPM

## 2024-08-08 DIAGNOSIS — E78.2 MIXED HYPERLIPIDEMIA: Primary | ICD-10-CM

## 2024-08-08 DIAGNOSIS — K59.01 SLOW TRANSIT CONSTIPATION: ICD-10-CM

## 2024-08-08 DIAGNOSIS — E03.9 ACQUIRED HYPOTHYROIDISM: ICD-10-CM

## 2024-08-08 DIAGNOSIS — R53.82 CHRONIC FATIGUE: ICD-10-CM

## 2024-08-08 DIAGNOSIS — R73.01 IFG (IMPAIRED FASTING GLUCOSE): ICD-10-CM

## 2024-08-08 DIAGNOSIS — E11.42 TYPE 2 DIABETES MELLITUS WITH DIABETIC POLYNEUROPATHY, WITHOUT LONG-TERM CURRENT USE OF INSULIN (HCC): ICD-10-CM

## 2024-08-08 RX ORDER — SENNA AND DOCUSATE SODIUM 50; 8.6 MG/1; MG/1
2 TABLET, FILM COATED ORAL DAILY
Qty: 60 TABLET | Refills: 4 | Status: SHIPPED | OUTPATIENT
Start: 2024-08-08 | End: 2025-08-08

## 2024-08-09 ASSESSMENT — ENCOUNTER SYMPTOMS
ABDOMINAL PAIN: 0
BLOOD IN STOOL: 0
DIARRHEA: 0
SHORTNESS OF BREATH: 0
PHOTOPHOBIA: 0
TROUBLE SWALLOWING: 0
COLOR CHANGE: 0
CONSTIPATION: 0
STRIDOR: 0
VOMITING: 0
BACK PAIN: 0
EYE ITCHING: 0
RHINORRHEA: 0
CHOKING: 0
ANAL BLEEDING: 0
COUGH: 0
VOICE CHANGE: 0
EYE PAIN: 0
RECTAL PAIN: 0
WHEEZING: 0
APNEA: 0
NAUSEA: 0
FACIAL SWELLING: 0
CHEST TIGHTNESS: 0
EYE REDNESS: 0
SINUS PRESSURE: 0
SORE THROAT: 0
EYE DISCHARGE: 0
ABDOMINAL DISTENTION: 0

## 2024-08-09 NOTE — PROGRESS NOTES
Take two tabs three times a day 270 capsule 3    amLODIPine (NORVASC) 5 MG tablet Take 1 tablet by mouth daily      primidone (MYSOLINE) 50 MG tablet Take 1 tablet by mouth nightly 90 tablet 3    ramelteon (ROZEREM) 8 MG tablet Take 1 tablet by mouth nightly 90 tablet 3    pravastatin (PRAVACHOL) 20 MG tablet Take 1 tablet by mouth nightly 90 tablet 4    dicyclomine (BENTYL) 10 MG capsule Take 1 capsule by mouth in the morning, at noon, and at bedtime 90 capsule 3    fluticasone (FLONASE) 50 MCG/ACT nasal spray 2 sprays by Each Nostril route daily 48 g 4    RESTASIS 0.05 % ophthalmic emulsion INSTILL 1 DROP IN BOTH EYES TWICE A DAY.      Varenicline Tartrate (TYRVAYA) 0.03 MG/ACT SOLN by Nasal route      blood glucose monitor strips Test 1 times a day & as needed for symptoms of irregular blood glucose. Dispense sufficient amount for indicated testing frequency plus additional to accommodate PRN testing needs. 100 strip 4    Lancets MISC 1 each by Does not apply route daily 100 each 5    prednisoLONE acetate (PRED FORTE) 1 % ophthalmic suspension INSTILL 1 DROP IN BOTH EYES TWICE DAILY FOR 1 MONTH      Tens Unit MISC by Does not apply route Zynex TENS unit 1 each 0    Aspirin Buf,CaCarb-MgCarb-MgO, 81 MG TABS Take by mouth      Lancets Misc. (ACCU-CHEK SOFTCLIX LANCET DEV) KIT       vitamin D (CHOLECALCIFEROL) 1000 UNIT TABS tablet Take 1 tablet by mouth in the morning and 1 tablet in the evening. Ld 01/06 .      Multiple Vitamin (MULTIVITAMIN PO) Take 1 tablet by mouth daily LD 01/06      Calcium Carbonate-Vit D-Min (CALCIUM 1200 PO) Take 600 mg by mouth 2 times daily LD 01/06       No current facility-administered medications on file prior to visit.       Allergies   Allergen Reactions    Penicillins Rash       I have reviewedher allergies, medications, problem list, medical, social and family history and have updated as needed in the electronic medical record.    Objective:     Physical Exam  Vitals and nursing

## 2024-08-22 ENCOUNTER — TELEPHONE (OUTPATIENT)
Dept: FAMILY MEDICINE CLINIC | Age: 74
End: 2024-08-22

## 2024-08-22 NOTE — TELEPHONE ENCOUNTER
Paulina/[facility] called to confirm patient's phone number, since she's called several times and has not been able to reach anyone.  I confirmed the phone number and asked Paulina if she would hold while I attempted to reach patient.  I called patient, left a detailed  msg and asked her to call Dr. Renteria [facility] back.  I informed Paulina that I LMOM.  Paulina stated she will attempt to reach Sandra Bobby, listed as emergency contact, who I confirmed is on HIPAA.    Patient called the ofc back.  I informed her of Paulina's attempts to reach her to schedule.  Patient stated she did receive the  msg and will call her back.

## 2024-09-03 DIAGNOSIS — E11.42 TYPE 2 DIABETES MELLITUS WITH DIABETIC POLYNEUROPATHY, WITHOUT LONG-TERM CURRENT USE OF INSULIN (HCC): ICD-10-CM

## 2024-09-03 RX ORDER — SEMAGLUTIDE 2.68 MG/ML
2 INJECTION, SOLUTION SUBCUTANEOUS
Qty: 3 ML | Refills: 4 | Status: SHIPPED | OUTPATIENT
Start: 2024-09-03

## 2024-09-23 RX ORDER — GABAPENTIN 300 MG/1
CAPSULE ORAL
Qty: 270 CAPSULE | Refills: 3 | Status: SHIPPED | OUTPATIENT
Start: 2024-09-23 | End: 2025-10-02

## 2024-10-07 RX ORDER — VENLAFAXINE HYDROCHLORIDE 75 MG/1
75 CAPSULE, EXTENDED RELEASE ORAL DAILY
Qty: 90 CAPSULE | Refills: 0 | Status: SHIPPED | OUTPATIENT
Start: 2024-10-07

## 2024-10-07 NOTE — TELEPHONE ENCOUNTER
Last seen 8/8/2024  Next appt 11/11/2024    Requested Prescriptions     Pending Prescriptions Disp Refills    venlafaxine (EFFEXOR XR) 75 MG extended release capsule 90 capsule 0     Sig: Take 1 capsule by mouth daily

## 2024-10-18 RX ORDER — AMLODIPINE BESYLATE 5 MG/1
5 TABLET ORAL DAILY
Qty: 90 TABLET | Refills: 0 | Status: SHIPPED | OUTPATIENT
Start: 2024-10-18

## 2024-10-18 NOTE — TELEPHONE ENCOUNTER
Name of Medication(s) Requested:  Requested Prescriptions     Pending Prescriptions Disp Refills    amLODIPine (NORVASC) 5 MG tablet [Pharmacy Med Name: amLODIPine Besylate Oral Tablet 5 MG] 90 tablet 0     Sig: TAKE ONE TABLET BY MOUTH DAILY       Medication is on current medication list Yes    Dosage and directions were verified? Yes    Quantity verified: 90 day supply     Pharmacy Verified?  Yes    Last Appointment:  8/8/2024    Future appts:  Future Appointments   Date Time Provider Department Center   11/11/2024  1:00 PM Tra Renteria DO [facility] DEP        (If no appt send self scheduling link. .REFILLAPPT)  Scheduling request sent?     [] Yes  [x] No    Does patient need updated?  [] Yes  [x] No

## 2024-10-29 ENCOUNTER — OFFICE VISIT (OUTPATIENT)
Dept: FAMILY MEDICINE CLINIC | Age: 74
End: 2024-10-29

## 2024-10-29 VITALS
SYSTOLIC BLOOD PRESSURE: 124 MMHG | HEART RATE: 79 BPM | WEIGHT: 197 LBS | DIASTOLIC BLOOD PRESSURE: 76 MMHG | OXYGEN SATURATION: 94 % | RESPIRATION RATE: 18 BRPM | HEIGHT: 62 IN | BODY MASS INDEX: 36.25 KG/M2

## 2024-10-29 DIAGNOSIS — I10 HTN (HYPERTENSION), BENIGN: ICD-10-CM

## 2024-10-29 DIAGNOSIS — R73.01 IFG (IMPAIRED FASTING GLUCOSE): ICD-10-CM

## 2024-10-29 DIAGNOSIS — R53.82 CHRONIC FATIGUE: ICD-10-CM

## 2024-10-29 DIAGNOSIS — E03.9 ACQUIRED HYPOTHYROIDISM: ICD-10-CM

## 2024-10-29 DIAGNOSIS — E11.42 TYPE 2 DIABETES MELLITUS WITH DIABETIC POLYNEUROPATHY, WITHOUT LONG-TERM CURRENT USE OF INSULIN (HCC): ICD-10-CM

## 2024-10-29 DIAGNOSIS — E78.2 MIXED HYPERLIPIDEMIA: ICD-10-CM

## 2024-10-29 DIAGNOSIS — Z01.818 PRE-OPERATIVE CLEARANCE: Primary | ICD-10-CM

## 2024-10-29 LAB
ALBUMIN: 4 G/DL (ref 3.5–5.2)
ALP BLD-CCNC: 68 U/L (ref 35–104)
ALT SERPL-CCNC: 12 U/L (ref 0–32)
ANION GAP SERPL CALCULATED.3IONS-SCNC: 13 MMOL/L (ref 7–16)
AST SERPL-CCNC: 22 U/L (ref 0–31)
BASOPHILS ABSOLUTE: 0.04 K/UL (ref 0–0.2)
BASOPHILS RELATIVE PERCENT: 1 % (ref 0–2)
BILIRUB SERPL-MCNC: 0.3 MG/DL (ref 0–1.2)
BUN BLDV-MCNC: 19 MG/DL (ref 6–23)
CALCIUM SERPL-MCNC: 9.5 MG/DL (ref 8.6–10.2)
CHLORIDE BLD-SCNC: 101 MMOL/L (ref 98–107)
CHOLESTEROL, TOTAL: 163 MG/DL
CO2: 26 MMOL/L (ref 22–29)
CREAT SERPL-MCNC: 1 MG/DL (ref 0.5–1)
EOSINOPHILS ABSOLUTE: 0.06 K/UL (ref 0.05–0.5)
EOSINOPHILS RELATIVE PERCENT: 1 % (ref 0–6)
FOLATE: >20 NG/ML (ref 4.8–24.2)
GFR, ESTIMATED: 58 ML/MIN/1.73M2
GLUCOSE BLD-MCNC: 91 MG/DL (ref 74–99)
HBA1C MFR BLD: 5.4 % (ref 4–5.6)
HCT VFR BLD CALC: 43.5 % (ref 34–48)
HDLC SERPL-MCNC: 56 MG/DL
HEMOGLOBIN: 13.8 G/DL (ref 11.5–15.5)
IMMATURE GRANULOCYTES %: 0 % (ref 0–5)
IMMATURE GRANULOCYTES ABSOLUTE: <0.03 K/UL (ref 0–0.58)
LDL CHOLESTEROL: 88 MG/DL
LYMPHOCYTES ABSOLUTE: 1.38 K/UL (ref 1.5–4)
LYMPHOCYTES RELATIVE PERCENT: 31 % (ref 20–42)
MCH RBC QN AUTO: 31.2 PG (ref 26–35)
MCHC RBC AUTO-ENTMCNC: 31.7 G/DL (ref 32–34.5)
MCV RBC AUTO: 98.2 FL (ref 80–99.9)
MONOCYTES ABSOLUTE: 0.42 K/UL (ref 0.1–0.95)
MONOCYTES RELATIVE PERCENT: 10 % (ref 2–12)
NEUTROPHILS ABSOLUTE: 2.49 K/UL (ref 1.8–7.3)
NEUTROPHILS RELATIVE PERCENT: 57 % (ref 43–80)
PDW BLD-RTO: 13.7 % (ref 11.5–15)
PLATELET # BLD: 245 K/UL (ref 130–450)
PMV BLD AUTO: 11.9 FL (ref 7–12)
POTASSIUM SERPL-SCNC: 4.7 MMOL/L (ref 3.5–5)
RBC # BLD: 4.43 M/UL (ref 3.5–5.5)
SODIUM BLD-SCNC: 140 MMOL/L (ref 132–146)
T4 FREE: 1 NG/DL (ref 0.9–1.7)
TOTAL PROTEIN: 6.6 G/DL (ref 6.4–8.3)
TRIGL SERPL-MCNC: 94 MG/DL
TSH SERPL DL<=0.05 MIU/L-ACNC: 2.45 UIU/ML (ref 0.27–4.2)
VITAMIN B-12: 518 PG/ML (ref 211–946)
VLDLC SERPL CALC-MCNC: 19 MG/DL
WBC # BLD: 4.4 K/UL (ref 4.5–11.5)

## 2024-10-30 ENCOUNTER — HOSPITAL ENCOUNTER (OUTPATIENT)
Age: 74
Discharge: HOME | End: 2024-10-30
Payer: MEDICARE

## 2024-10-30 LAB
EKG ATRIAL RATE: 72 BPM
EKG P AXIS: 29 DEGREES
EKG P-R INTERVAL: 158 MS
EKG Q-T INTERVAL: 404 MS
EKG QRS DURATION: 86 MS
EKG QTC CALCULATION (BAZETT): 442 MS
EKG R AXIS: 7 DEGREES
EKG T AXIS: 57 DEGREES
EKG VENTRICULAR RATE: 72 BPM

## 2024-10-30 PROCEDURE — 93005 ELECTROCARDIOGRAM TRACING: CPT | Performed by: FAMILY MEDICINE

## 2024-11-01 ASSESSMENT — ENCOUNTER SYMPTOMS
PHOTOPHOBIA: 0
ABDOMINAL DISTENTION: 0
CHEST TIGHTNESS: 0
WHEEZING: 0
ABDOMINAL PAIN: 0
SINUS PRESSURE: 0
BACK PAIN: 1
EYE PAIN: 0
VOMITING: 0
SORE THROAT: 0
DIARRHEA: 0
COUGH: 0
COLOR CHANGE: 0
STRIDOR: 0
APNEA: 0
EYE ITCHING: 0
NAUSEA: 0
CONSTIPATION: 0
CHOKING: 0
BLOOD IN STOOL: 0
EYE DISCHARGE: 0
VOICE CHANGE: 0
RHINORRHEA: 0
FACIAL SWELLING: 0
SHORTNESS OF BREATH: 0
EYE REDNESS: 0
RECTAL PAIN: 0
ANAL BLEEDING: 0
TROUBLE SWALLOWING: 0

## 2024-11-01 NOTE — PROGRESS NOTES
Behavior: Behavior normal.         Thought Content: Thought content normal.         Judgment: Judgment normal.         Assessment / Plan:   Yeimi was seen today for pre-op exam.    Diagnoses and all orders for this visit:    Pre-operative clearance  -     Cancel: EKG 12 lead; Future  -     EKG 12 lead; Future    Type 2 diabetes mellitus with diabetic polyneuropathy, without long-term current use of insulin (HCC)  -     Hemoglobin A1C; Future  -     Semaglutide,0.25 or 0.5MG/DOS, 2 MG/3ML SOPN; Inject 0.5 mg into the skin once a week    Acquired hypothyroidism    HTN (hypertension), benign    Mixed hyperlipidemia  -     Lipid Panel; Future    Chronic fatigue  -     CBC with Auto Differential; Future  -     Comprehensive Metabolic Panel; Future  -     TSH; Future    Cleared for surgery    Willfollow with own gynecologist for gynecological and breast care.    Reviewed health maintenance report. Patient is aware of deficiencies and suggested preventative tests.

## 2024-12-31 RX ORDER — VENLAFAXINE HYDROCHLORIDE 75 MG/1
75 CAPSULE, EXTENDED RELEASE ORAL DAILY
Qty: 90 CAPSULE | Refills: 0 | Status: SHIPPED | OUTPATIENT
Start: 2024-12-31

## 2025-01-13 DIAGNOSIS — E11.42 TYPE 2 DIABETES MELLITUS WITH DIABETIC POLYNEUROPATHY, WITHOUT LONG-TERM CURRENT USE OF INSULIN (HCC): ICD-10-CM

## 2025-01-13 RX ORDER — SEMAGLUTIDE 1.34 MG/ML
INJECTION, SOLUTION SUBCUTANEOUS
Qty: 3 ML | Refills: 0 | OUTPATIENT
Start: 2025-01-13

## 2025-01-20 DIAGNOSIS — S97.82XA CRUSHING INJURY OF LEFT FOOT, INITIAL ENCOUNTER: Primary | ICD-10-CM

## 2025-02-11 DIAGNOSIS — E11.42 TYPE 2 DIABETES MELLITUS WITH DIABETIC POLYNEUROPATHY, WITHOUT LONG-TERM CURRENT USE OF INSULIN (HCC): ICD-10-CM

## 2025-02-18 DIAGNOSIS — E11.42 TYPE 2 DIABETES MELLITUS WITH DIABETIC POLYNEUROPATHY, WITHOUT LONG-TERM CURRENT USE OF INSULIN (HCC): Primary | ICD-10-CM

## 2025-02-21 DIAGNOSIS — I10 HTN (HYPERTENSION), BENIGN: ICD-10-CM

## 2025-02-22 DIAGNOSIS — I10 HTN (HYPERTENSION), BENIGN: ICD-10-CM

## 2025-02-24 RX ORDER — AMLODIPINE BESYLATE 5 MG/1
5 TABLET ORAL DAILY
Qty: 90 TABLET | Refills: 0 | Status: SHIPPED | OUTPATIENT
Start: 2025-02-24

## 2025-02-24 RX ORDER — VALSARTAN 80 MG/1
80 TABLET ORAL DAILY
Qty: 90 TABLET | Refills: 3 | OUTPATIENT
Start: 2025-02-24

## 2025-02-24 RX ORDER — VALSARTAN 80 MG/1
80 TABLET ORAL DAILY
Qty: 90 TABLET | Refills: 3 | Status: SHIPPED | OUTPATIENT
Start: 2025-02-24

## 2025-03-04 RX ORDER — VENLAFAXINE HYDROCHLORIDE 75 MG/1
75 CAPSULE, EXTENDED RELEASE ORAL DAILY
Qty: 90 CAPSULE | Refills: 0 | Status: SHIPPED | OUTPATIENT
Start: 2025-03-04

## 2025-03-13 ASSESSMENT — PATIENT HEALTH QUESTIONNAIRE - PHQ9
SUM OF ALL RESPONSES TO PHQ QUESTIONS 1-9: 0
1. LITTLE INTEREST OR PLEASURE IN DOING THINGS: NOT AT ALL
SUM OF ALL RESPONSES TO PHQ9 QUESTIONS 1 & 2: 0
SUM OF ALL RESPONSES TO PHQ QUESTIONS 1-9: 0
1. LITTLE INTEREST OR PLEASURE IN DOING THINGS: NOT AT ALL
SUM OF ALL RESPONSES TO PHQ QUESTIONS 1-9: 0
2. FEELING DOWN, DEPRESSED OR HOPELESS: NOT AT ALL
2. FEELING DOWN, DEPRESSED OR HOPELESS: NOT AT ALL
SUM OF ALL RESPONSES TO PHQ QUESTIONS 1-9: 0

## 2025-03-16 DIAGNOSIS — G25.0 ESSENTIAL TREMOR: ICD-10-CM

## 2025-03-17 SDOH — HEALTH STABILITY: PHYSICAL HEALTH: ON AVERAGE, HOW MANY DAYS PER WEEK DO YOU ENGAGE IN MODERATE TO STRENUOUS EXERCISE (LIKE A BRISK WALK)?: 0 DAYS

## 2025-03-17 SDOH — ECONOMIC STABILITY: INCOME INSECURITY: IN THE LAST 12 MONTHS, WAS THERE A TIME WHEN YOU WERE NOT ABLE TO PAY THE MORTGAGE OR RENT ON TIME?: NO

## 2025-03-17 SDOH — ECONOMIC STABILITY: TRANSPORTATION INSECURITY
IN THE PAST 12 MONTHS, HAS THE LACK OF TRANSPORTATION KEPT YOU FROM MEDICAL APPOINTMENTS OR FROM GETTING MEDICATIONS?: NO

## 2025-03-17 SDOH — ECONOMIC STABILITY: FOOD INSECURITY: WITHIN THE PAST 12 MONTHS, YOU WORRIED THAT YOUR FOOD WOULD RUN OUT BEFORE YOU GOT MONEY TO BUY MORE.: NEVER TRUE

## 2025-03-17 SDOH — ECONOMIC STABILITY: FOOD INSECURITY: WITHIN THE PAST 12 MONTHS, THE FOOD YOU BOUGHT JUST DIDN'T LAST AND YOU DIDN'T HAVE MONEY TO GET MORE.: NEVER TRUE

## 2025-03-17 ASSESSMENT — LIFESTYLE VARIABLES
HOW OFTEN DO YOU HAVE A DRINK CONTAINING ALCOHOL: NEVER
HOW MANY STANDARD DRINKS CONTAINING ALCOHOL DO YOU HAVE ON A TYPICAL DAY: PATIENT DOES NOT DRINK
HOW OFTEN DO YOU HAVE A DRINK CONTAINING ALCOHOL: 1
HOW OFTEN DO YOU HAVE SIX OR MORE DRINKS ON ONE OCCASION: 1
HOW MANY STANDARD DRINKS CONTAINING ALCOHOL DO YOU HAVE ON A TYPICAL DAY: 0

## 2025-03-18 RX ORDER — PRIMIDONE 50 MG/1
50 TABLET ORAL NIGHTLY
Qty: 90 TABLET | Refills: 3 | Status: SHIPPED | OUTPATIENT
Start: 2025-03-18

## 2025-03-20 ENCOUNTER — OFFICE VISIT (OUTPATIENT)
Dept: FAMILY MEDICINE CLINIC | Age: 75
End: 2025-03-20

## 2025-03-20 VITALS
SYSTOLIC BLOOD PRESSURE: 126 MMHG | DIASTOLIC BLOOD PRESSURE: 80 MMHG | OXYGEN SATURATION: 97 % | WEIGHT: 194 LBS | BODY MASS INDEX: 35.7 KG/M2 | HEART RATE: 70 BPM | RESPIRATION RATE: 18 BRPM | HEIGHT: 62 IN

## 2025-03-20 DIAGNOSIS — J30.1 SEASONAL ALLERGIC RHINITIS DUE TO POLLEN: ICD-10-CM

## 2025-03-20 DIAGNOSIS — E66.01 MORBID (SEVERE) OBESITY DUE TO EXCESS CALORIES: ICD-10-CM

## 2025-03-20 DIAGNOSIS — E61.1 IRON DEFICIENCY: ICD-10-CM

## 2025-03-20 DIAGNOSIS — Z12.31 ENCOUNTER FOR SCREENING MAMMOGRAM FOR HIGH-RISK PATIENT: ICD-10-CM

## 2025-03-20 DIAGNOSIS — E03.9 ACQUIRED HYPOTHYROIDISM: ICD-10-CM

## 2025-03-20 DIAGNOSIS — E78.2 MIXED HYPERLIPIDEMIA: Primary | ICD-10-CM

## 2025-03-20 DIAGNOSIS — R53.82 CHRONIC FATIGUE: ICD-10-CM

## 2025-03-20 DIAGNOSIS — Z00.00 MEDICARE ANNUAL WELLNESS VISIT, SUBSEQUENT: ICD-10-CM

## 2025-03-20 DIAGNOSIS — Z12.31 ENCOUNTER FOR SCREENING MAMMOGRAM FOR MALIGNANT NEOPLASM OF BREAST: ICD-10-CM

## 2025-03-20 DIAGNOSIS — I73.9 PAD (PERIPHERAL ARTERY DISEASE): ICD-10-CM

## 2025-03-20 DIAGNOSIS — E11.42 TYPE 2 DIABETES MELLITUS WITH DIABETIC POLYNEUROPATHY, WITHOUT LONG-TERM CURRENT USE OF INSULIN (HCC): ICD-10-CM

## 2025-03-20 RX ORDER — MONTELUKAST SODIUM 10 MG/1
10 TABLET ORAL DAILY
Qty: 30 TABLET | Refills: 3 | Status: SHIPPED | OUTPATIENT
Start: 2025-03-20

## 2025-03-20 RX ORDER — AZELASTINE 1 MG/ML
2 SPRAY, METERED NASAL 2 TIMES DAILY
Qty: 30 ML | Refills: 2 | Status: SHIPPED | OUTPATIENT
Start: 2025-03-20

## 2025-03-22 NOTE — PROGRESS NOTES
have patient stop semaglutide and we will recheck A1c in another 2 months we will start another medication if needed also did not tolerate metformin in past.  reviewed health maintenance report. Patient is aware of deficiencies and suggested preventative tests.

## 2025-03-28 RX ORDER — VENLAFAXINE HYDROCHLORIDE 75 MG/1
75 CAPSULE, EXTENDED RELEASE ORAL DAILY
Qty: 90 CAPSULE | Refills: 0 | Status: SHIPPED | OUTPATIENT
Start: 2025-03-28

## 2025-03-28 NOTE — TELEPHONE ENCOUNTER
Name of Medication(s) Requested:  Requested Prescriptions     Pending Prescriptions Disp Refills    venlafaxine (EFFEXOR XR) 75 MG extended release capsule 90 capsule 0     Sig: Take 1 capsule by mouth daily       Medication is on current medication list Yes    Dosage and directions were verified? Yes    Quantity verified: 90 day supply     Pharmacy Verified?  Yes    Last Appointment:  3/20/2025    Future appts:  Future Appointments   Date Time Provider Department Center   4/1/2025 12:00 PM [facility] M[facility]MO HOLOGIC [facility]   4/3/2025  1:00 PM [facility] US RM 1 [facility]   6/26/2025 12:30 PM Tra Renteria, DO Tamy CHACON [facility] DEP        (If no appt send self scheduling link. .REFILLAPPT)  Scheduling request sent?     [] Yes  [x] No    Does patient need updated?  [] Yes  [x] No

## 2025-04-01 ENCOUNTER — HOSPITAL ENCOUNTER (OUTPATIENT)
Dept: MAMMOGRAPHY | Age: 75
Discharge: HOME | End: 2025-04-03
Payer: MEDICARE

## 2025-04-01 DIAGNOSIS — Z12.31 ENCOUNTER FOR SCREENING MAMMOGRAM FOR MALIGNANT NEOPLASM OF BREAST: ICD-10-CM

## 2025-04-01 DIAGNOSIS — L90.0 LICHEN SCLEROSUS: Primary | ICD-10-CM

## 2025-04-01 PROCEDURE — 77063 BREAST TOMOSYNTHESIS BI: CPT

## 2025-04-03 ENCOUNTER — HOSPITAL ENCOUNTER (OUTPATIENT)
Dept: ULTRASOUND IMAGING | Age: 75
Discharge: HOME | End: 2025-04-03
Payer: MEDICARE

## 2025-04-03 DIAGNOSIS — I73.9 PAD (PERIPHERAL ARTERY DISEASE): ICD-10-CM

## 2025-04-03 PROCEDURE — 93925 LOWER EXTREMITY STUDY: CPT

## 2025-04-20 DIAGNOSIS — E78.2 MIXED HYPERLIPIDEMIA: ICD-10-CM

## 2025-04-21 NOTE — TELEPHONE ENCOUNTER
Name of Medication(s) Requested:  Requested Prescriptions     Pending Prescriptions Disp Refills    pravastatin (PRAVACHOL) 20 MG tablet 90 tablet 4     Sig: Take 1 tablet by mouth nightly       Medication is on current medication list Yes    Dosage and directions were verified? Yes    Quantity verified: 90 day supply     Pharmacy Verified?  Yes    Last Appointment:  3/20/2025    Future appts:  Future Appointments   Date Time Provider Department Center   6/26/2025 12:30 PM Tra Renteria, DO Tamy CHACON [facility] E[facility] DEP        (If no appt send self scheduling link. .REFILLAPPT)  Scheduling request sent?     [] Yes  [x] No    Does patient need updated?  [] Yes  [x] No

## 2025-04-23 RX ORDER — PRAVASTATIN SODIUM 20 MG
20 TABLET ORAL NIGHTLY
Qty: 90 TABLET | Refills: 4 | Status: SHIPPED | OUTPATIENT
Start: 2025-04-23

## 2025-05-09 ENCOUNTER — TRANSCRIBE ORDERS (OUTPATIENT)
Dept: ADMINISTRATIVE | Age: 75
End: 2025-05-09

## 2025-05-09 DIAGNOSIS — C51.9 VULVAR CANCER (HCC): Primary | ICD-10-CM

## 2025-05-09 DIAGNOSIS — C51.9 PRIMARY VULVAR CANCER (HCC): Primary | ICD-10-CM

## 2025-05-13 ENCOUNTER — HOSPITAL ENCOUNTER (OUTPATIENT)
Dept: PET IMAGING | Age: 75
Discharge: HOME | End: 2025-05-15
Payer: MEDICARE

## 2025-05-13 DIAGNOSIS — C51.9 PRIMARY VULVAR CANCER (HCC): ICD-10-CM

## 2025-05-13 LAB — GLUCOSE BLD-MCNC: 89 MG/DL (ref 74–99)

## 2025-05-13 PROCEDURE — 82962 GLUCOSE BLOOD TEST: CPT

## 2025-05-13 PROCEDURE — 3430000000 HC RX DIAGNOSTIC RADIOPHARMACEUTICAL: Performed by: RADIOLOGY

## 2025-05-13 PROCEDURE — 78815 PET IMAGE W/CT SKULL-THIGH: CPT

## 2025-05-13 PROCEDURE — A9609 HC RX DIAGNOSTIC RADIOPHARMACEUTICAL: HCPCS | Performed by: RADIOLOGY

## 2025-05-13 RX ORDER — FLUDEOXYGLUCOSE F 18 200 MCI/ML
15 INJECTION, SOLUTION INTRAVENOUS ONCE
Status: COMPLETED | OUTPATIENT
Start: 2025-05-13 | End: 2025-05-13

## 2025-05-13 RX ADMIN — FLUDEOXYGLUCOSE F 18 15 MILLICURIE: 200 INJECTION, SOLUTION INTRAVENOUS at 13:55

## 2025-05-16 DIAGNOSIS — J30.1 SEASONAL ALLERGIC RHINITIS DUE TO POLLEN: ICD-10-CM

## 2025-05-16 RX ORDER — FLUTICASONE PROPIONATE 50 MCG
2 SPRAY, SUSPENSION (ML) NASAL DAILY
Qty: 48 G | Refills: 0 | Status: SHIPPED | OUTPATIENT
Start: 2025-05-16

## 2025-05-16 NOTE — TELEPHONE ENCOUNTER
Name of Medication(s) Requested:  Requested Prescriptions     Pending Prescriptions Disp Refills    fluticasone (FLONASE) 50 MCG/ACT nasal spray [Pharmacy Med Name: Fluticasone Propionate Nasal Suspension 50 MCG/ACT] 48 g 0     Sig: USE 2 SPRAYS IN EACH NOSTRIL DAILY       Medication is on current medication list Yes    Dosage and directions were verified? Yes    Quantity verified: 90 day supply     Pharmacy Verified?  Yes    Last Appointment:  3/20/2025    Future appts:  Future Appointments   Date Time Provider Department Center   6/26/2025 12:30 PM Tra Renteria DO [facility] DEP        (If no appt send self scheduling link. .REFILLAPPT)  Scheduling request sent?     [] Yes  [x] No    Does patient need updated?  [] Yes  [x] No

## 2025-06-02 RX ORDER — AMLODIPINE BESYLATE 5 MG/1
5 TABLET ORAL DAILY
Qty: 90 TABLET | Refills: 0 | Status: SHIPPED | OUTPATIENT
Start: 2025-06-02

## 2025-06-02 NOTE — TELEPHONE ENCOUNTER
Name of Medication(s) Requested:  Requested Prescriptions     Pending Prescriptions Disp Refills    amLODIPine (NORVASC) 5 MG tablet 90 tablet 0     Sig: Take 1 tablet by mouth daily       Medication is on current medication list Yes    Dosage and directions were verified? Yes    Quantity verified: 90 day supply     Pharmacy Verified?  Yes    Last Appointment:  3/20/2025    Future appts:  Future Appointments   Date Time Provider Department Center   6/26/2025 12:30 PM Tra Renteria, DO Tamy CHACON [facility] E[facility] DEP        (If no appt send self scheduling link. .REFILLAPPT)  Scheduling request sent?     [] Yes  [x] No    Does patient need updated?  [] Yes  [x] No

## 2025-06-05 DIAGNOSIS — E11.42 TYPE 2 DIABETES MELLITUS WITH DIABETIC POLYNEUROPATHY, WITHOUT LONG-TERM CURRENT USE OF INSULIN (HCC): ICD-10-CM

## 2025-06-05 RX ORDER — SEMAGLUTIDE 1.34 MG/ML
INJECTION, SOLUTION SUBCUTANEOUS
Qty: 3 ML | Refills: 0 | OUTPATIENT
Start: 2025-06-05

## 2025-06-18 DIAGNOSIS — E11.42 TYPE 2 DIABETES MELLITUS WITH DIABETIC POLYNEUROPATHY, WITHOUT LONG-TERM CURRENT USE OF INSULIN (HCC): ICD-10-CM

## 2025-06-18 DIAGNOSIS — E78.2 MIXED HYPERLIPIDEMIA: ICD-10-CM

## 2025-06-18 DIAGNOSIS — R53.82 CHRONIC FATIGUE: ICD-10-CM

## 2025-06-18 DIAGNOSIS — E03.9 ACQUIRED HYPOTHYROIDISM: ICD-10-CM

## 2025-06-18 DIAGNOSIS — E61.1 IRON DEFICIENCY: ICD-10-CM

## 2025-06-18 LAB
ALBUMIN: 3.7 G/DL (ref 3.5–5.2)
ALP BLD-CCNC: 76 U/L (ref 35–104)
ALT SERPL-CCNC: 14 U/L (ref 0–35)
ANION GAP SERPL CALCULATED.3IONS-SCNC: 12 MMOL/L (ref 7–16)
AST SERPL-CCNC: 26 U/L (ref 0–35)
BASOPHILS ABSOLUTE: 0.04 K/UL (ref 0–0.2)
BASOPHILS RELATIVE PERCENT: 1 % (ref 0–2)
BILIRUB SERPL-MCNC: 0.3 MG/DL (ref 0–1.2)
BUN BLDV-MCNC: 16 MG/DL (ref 8–23)
CALCIUM SERPL-MCNC: 9.4 MG/DL (ref 8.8–10.2)
CHLORIDE BLD-SCNC: 104 MMOL/L (ref 98–107)
CHOLESTEROL, TOTAL: 157 MG/DL
CO2: 27 MMOL/L (ref 22–29)
CREAT SERPL-MCNC: 0.8 MG/DL (ref 0.5–1)
CREATININE URINE: 83.4 MG/DL (ref 29–226)
EOSINOPHILS ABSOLUTE: 0.08 K/UL (ref 0.05–0.5)
EOSINOPHILS RELATIVE PERCENT: 2 % (ref 0–6)
GFR, ESTIMATED: 74 ML/MIN/1.73M2
GLUCOSE BLD-MCNC: 109 MG/DL (ref 74–99)
HBA1C MFR BLD: 5.9 % (ref 4–5.6)
HCT VFR BLD CALC: 39.5 % (ref 34–48)
HDLC SERPL-MCNC: 63 MG/DL
HEMOGLOBIN: 12.4 G/DL (ref 11.5–15.5)
IMMATURE GRANULOCYTES %: 0 % (ref 0–5)
IMMATURE GRANULOCYTES ABSOLUTE: <0.03 K/UL (ref 0–0.58)
IRON % SATURATION: 11 % (ref 15–50)
IRON: 39 UG/DL (ref 37–145)
LDL CHOLESTEROL: 77 MG/DL
LYMPHOCYTES ABSOLUTE: 0.92 K/UL (ref 1.5–4)
LYMPHOCYTES RELATIVE PERCENT: 20 % (ref 20–42)
MCH RBC QN AUTO: 30.2 PG (ref 26–35)
MCHC RBC AUTO-ENTMCNC: 31.4 G/DL (ref 32–34.5)
MCV RBC AUTO: 96.1 FL (ref 80–99.9)
MICROALBUMIN/CREAT 24H UR: 79 MG/L (ref 0–20)
MICROALBUMIN/CREAT UR-RTO: 95 MCG/MG CREAT (ref 0–30)
MONOCYTES ABSOLUTE: 0.42 K/UL (ref 0.1–0.95)
MONOCYTES RELATIVE PERCENT: 9 % (ref 2–12)
NEUTROPHILS ABSOLUTE: 3.05 K/UL (ref 1.8–7.3)
NEUTROPHILS RELATIVE PERCENT: 67 % (ref 43–80)
PDW BLD-RTO: 12.9 % (ref 11.5–15)
PLATELET # BLD: 254 K/UL (ref 130–450)
PMV BLD AUTO: 12.2 FL (ref 7–12)
POTASSIUM SERPL-SCNC: 4.2 MMOL/L (ref 3.5–5.1)
RBC # BLD: 4.11 M/UL (ref 3.5–5.5)
SODIUM BLD-SCNC: 143 MMOL/L (ref 136–145)
T4 FREE: 0.9 NG/DL (ref 0.9–1.7)
TOTAL IRON BINDING CAPACITY: 338 UG/DL (ref 250–450)
TOTAL PROTEIN: 6.6 G/DL (ref 6.4–8.3)
TRIGL SERPL-MCNC: 87 MG/DL
TSH SERPL DL<=0.05 MIU/L-ACNC: 2.38 UIU/ML (ref 0.27–4.2)
VLDLC SERPL CALC-MCNC: 17 MG/DL
WBC # BLD: 4.5 K/UL (ref 4.5–11.5)

## 2025-06-26 ENCOUNTER — OFFICE VISIT (OUTPATIENT)
Dept: FAMILY MEDICINE CLINIC | Age: 75
End: 2025-06-26

## 2025-06-26 VITALS
HEART RATE: 64 BPM | OXYGEN SATURATION: 96 % | DIASTOLIC BLOOD PRESSURE: 72 MMHG | SYSTOLIC BLOOD PRESSURE: 124 MMHG | RESPIRATION RATE: 16 BRPM | BODY MASS INDEX: 34.04 KG/M2 | HEIGHT: 62 IN | WEIGHT: 185 LBS

## 2025-06-26 DIAGNOSIS — E11.9 TYPE 2 DIABETES MELLITUS WITHOUT COMPLICATION, WITHOUT LONG-TERM CURRENT USE OF INSULIN (HCC): ICD-10-CM

## 2025-06-26 DIAGNOSIS — N30.00 ACUTE CYSTITIS WITHOUT HEMATURIA: ICD-10-CM

## 2025-06-26 DIAGNOSIS — R73.01 IFG (IMPAIRED FASTING GLUCOSE): ICD-10-CM

## 2025-06-26 DIAGNOSIS — E03.9 ACQUIRED HYPOTHYROIDISM: ICD-10-CM

## 2025-06-26 DIAGNOSIS — I10 HTN (HYPERTENSION), BENIGN: ICD-10-CM

## 2025-06-26 DIAGNOSIS — E78.2 MIXED HYPERLIPIDEMIA: ICD-10-CM

## 2025-06-26 DIAGNOSIS — E11.42 TYPE 2 DIABETES MELLITUS WITH DIABETIC POLYNEUROPATHY, WITHOUT LONG-TERM CURRENT USE OF INSULIN (HCC): ICD-10-CM

## 2025-06-26 DIAGNOSIS — E61.1 IRON DEFICIENCY: Primary | ICD-10-CM

## 2025-06-26 RX ORDER — VENLAFAXINE HYDROCHLORIDE 75 MG/1
75 CAPSULE, EXTENDED RELEASE ORAL DAILY
Qty: 90 CAPSULE | Refills: 0 | Status: SHIPPED | OUTPATIENT
Start: 2025-06-26

## 2025-06-26 RX ORDER — SULFAMETHOXAZOLE AND TRIMETHOPRIM 800; 160 MG/1; MG/1
1 TABLET ORAL 2 TIMES DAILY
Qty: 14 TABLET | Refills: 0 | Status: SHIPPED | OUTPATIENT
Start: 2025-06-26 | End: 2025-07-03

## 2025-06-26 RX ORDER — FERROUS SULFATE 325(65) MG
325 TABLET ORAL
Qty: 90 TABLET | Refills: 1 | Status: SHIPPED | OUTPATIENT
Start: 2025-06-26 | End: 2025-06-26

## 2025-06-26 RX ORDER — FLUCONAZOLE 100 MG/1
100 TABLET ORAL DAILY
Qty: 7 TABLET | Refills: 0 | Status: SHIPPED | OUTPATIENT
Start: 2025-06-26 | End: 2025-07-03

## 2025-06-26 RX ORDER — FERROUS SULFATE 325(65) MG
TABLET ORAL
Qty: 24 TABLET | Refills: 3 | Status: SHIPPED | OUTPATIENT
Start: 2025-06-26

## 2025-07-09 DIAGNOSIS — M65.319 TRIGGER FINGER OF THUMB, UNSPECIFIED LATERALITY: Primary | ICD-10-CM

## 2025-07-13 DIAGNOSIS — J30.1 SEASONAL ALLERGIC RHINITIS DUE TO POLLEN: ICD-10-CM

## 2025-07-14 ENCOUNTER — TRANSCRIBE ORDERS (OUTPATIENT)
Dept: ADMINISTRATIVE | Age: 75
End: 2025-07-14

## 2025-07-14 DIAGNOSIS — M47.816 SPONDYLOSIS OF LUMBAR REGION WITHOUT MYELOPATHY OR RADICULOPATHY: Primary | ICD-10-CM

## 2025-07-14 RX ORDER — MONTELUKAST SODIUM 10 MG/1
10 TABLET ORAL DAILY
Qty: 90 TABLET | Refills: 3 | Status: SHIPPED | OUTPATIENT
Start: 2025-07-14

## 2025-07-14 NOTE — TELEPHONE ENCOUNTER
Name of Medication(s) Requested:  Requested Prescriptions     Pending Prescriptions Disp Refills    montelukast (SINGULAIR) 10 MG tablet 30 tablet 3     Sig: Take 1 tablet by mouth daily       Medication is on current medication list Yes    Dosage and directions were verified? Yes    Quantity verified: 90 day supply     Pharmacy Verified?  Yes    Last Appointment:  6/26/2025    Future appts:  Future Appointments   Date Time Provider Department Center   7/22/2025  1:10 PM Freddy Quezada DO [place] [facility]   10/1/2025  1:30 PM Tra Renteria DO [place] DE[facility]        (If no appt send self scheduling link. .REFILLAPPT)  Scheduling request sent?     [] Yes  [x] No    Does patient need updated?  [] Yes  [x] No

## 2025-07-16 DIAGNOSIS — M79.641 RIGHT HAND PAIN: Primary | ICD-10-CM

## 2025-07-17 ENCOUNTER — HOSPITAL ENCOUNTER (OUTPATIENT)
Dept: MRI IMAGING | Age: 75
Discharge: HOME | End: 2025-07-19
Payer: MEDICARE

## 2025-07-17 DIAGNOSIS — M47.816 SPONDYLOSIS OF LUMBAR REGION WITHOUT MYELOPATHY OR RADICULOPATHY: ICD-10-CM

## 2025-07-17 PROCEDURE — 72148 MRI LUMBAR SPINE W/O DYE: CPT

## 2025-07-22 ENCOUNTER — OFFICE VISIT (OUTPATIENT)
Dept: ORTHOPEDIC SURGERY | Age: 75
End: 2025-07-22
Payer: MEDICARE

## 2025-07-22 VITALS — HEIGHT: 62 IN | WEIGHT: 185 LBS | BODY MASS INDEX: 34.04 KG/M2

## 2025-07-22 DIAGNOSIS — M18.11 ARTHRITIS OF CARPOMETACARPAL (CMC) JOINT OF RIGHT THUMB: Primary | ICD-10-CM

## 2025-07-22 PROCEDURE — 1159F MED LIST DOCD IN RCRD: CPT | Performed by: ORTHOPAEDIC SURGERY

## 2025-07-22 PROCEDURE — 20600 DRAIN/INJ JOINT/BURSA W/O US: CPT | Performed by: ORTHOPAEDIC SURGERY

## 2025-07-22 PROCEDURE — 1036F TOBACCO NON-USER: CPT | Performed by: ORTHOPAEDIC SURGERY

## 2025-07-22 PROCEDURE — 1125F AMNT PAIN NOTED PAIN PRSNT: CPT | Performed by: ORTHOPAEDIC SURGERY

## 2025-07-22 PROCEDURE — G8427 DOCREV CUR MEDS BY ELIG CLIN: HCPCS | Performed by: ORTHOPAEDIC SURGERY

## 2025-07-22 PROCEDURE — 1090F PRES/ABSN URINE INCON ASSESS: CPT | Performed by: ORTHOPAEDIC SURGERY

## 2025-07-22 PROCEDURE — 99213 OFFICE O/P EST LOW 20 MIN: CPT | Performed by: ORTHOPAEDIC SURGERY

## 2025-07-22 PROCEDURE — G8399 PT W/DXA RESULTS DOCUMENT: HCPCS | Performed by: ORTHOPAEDIC SURGERY

## 2025-07-22 PROCEDURE — G8417 CALC BMI ABV UP PARAM F/U: HCPCS | Performed by: ORTHOPAEDIC SURGERY

## 2025-07-22 PROCEDURE — 1123F ACP DISCUSS/DSCN MKR DOCD: CPT | Performed by: ORTHOPAEDIC SURGERY

## 2025-07-22 NOTE — PROGRESS NOTES
Chief Complaint   Patient presents with    Hand Pain     Right Hand Thumb x years, no known recent injury, no previous right hand surgery.  States of pain increasing.       Yeimi Riggins is a 75 y.o. year old  female who presents for evaluation of right hand pain.  she reports this started about 1 year ago.  she does not remember a specific injury that started the pain.  The injury was none. Her pain is located mainly over the thumb joint. The pain is worse with movement and better with rest.  The patient has tried OTC analgesics.  The treatment has not been effective.  The patient is Right hand dominant.   Past Medical History:   Diagnosis Date    Arthritis     Asthma     no inhaler, controlled ; hist years ago of asthmatic bronchitis, denies problems since    Cancer (HCC)     Left vuvectomy squamous cell carcinoma    Depression     Diabetes mellitus (HCC)     History of blood transfusion     Hyperlipidemia     Hypertension     Low back pain     Right cataract 2018     Past Surgical History:   Procedure Laterality Date    ABDOMEN SURGERY      CATARACT REMOVAL WITH IMPLANT Right 2018     SECTION      COLONOSCOPY      EYE SURGERY      bilateral cataracts    FRACTURE SURGERY      left foot    JOINT REPLACEMENT      total left knee    KNEE ARTHROPLASTY Left 2013    totoal joint arthroplasty left knee with platelet gel and nerve block    NERVE BLOCK  7  12    caudal #1    NERVE BLOCK   12    caudal #2    NERVE BLOCK  12    therapeutic caudal with epiduragram    NERVE BLOCK  2018    lumbar epidural injection #1    NERVE BLOCK Right 2018    lumbar medial branch block  #1    POLYPECTOMY      removed from uterus    MT NJX DX/THER AGT PVRT FACET JT LMBR/SAC 1 LEVEL Right 2018    RIGHT LUMBAR L2,3,4,MEDIAL BRANCH BLOCK #1 performed by Alexy Patel MD at [facility] OR    MT IVY DFROSA W/COLUM LNGT TIP ONLY N/A 2018    L3-4 LUMBAR EPIDURAL STEROID INJECTION

## 2025-07-28 RX ORDER — GABAPENTIN 300 MG/1
600 CAPSULE ORAL 3 TIMES DAILY
Qty: 270 CAPSULE | Refills: 3 | Status: SHIPPED | OUTPATIENT
Start: 2025-07-28 | End: 2026-07-28

## 2025-08-11 DIAGNOSIS — Z01.818 PRE-OPERATIVE CLEARANCE: Primary | ICD-10-CM

## 2025-08-15 DIAGNOSIS — J30.1 SEASONAL ALLERGIC RHINITIS DUE TO POLLEN: ICD-10-CM

## 2025-08-15 RX ORDER — FLUTICASONE PROPIONATE 50 MCG
SPRAY, SUSPENSION (ML) NASAL
Qty: 48 G | Refills: 0 | Status: SHIPPED | OUTPATIENT
Start: 2025-08-15

## 2025-08-25 RX ORDER — AMLODIPINE BESYLATE 5 MG/1
5 TABLET ORAL DAILY
Qty: 90 TABLET | Refills: 3 | Status: SHIPPED | OUTPATIENT
Start: 2025-08-25

## 2025-08-29 ENCOUNTER — HOSPITAL ENCOUNTER (OUTPATIENT)
Dept: LAB | Age: 75
Discharge: HOME | End: 2025-08-29
Payer: MEDICARE

## 2025-08-29 ENCOUNTER — HOSPITAL ENCOUNTER (OUTPATIENT)
Dept: NON INVASIVE DIAGNOSTICS | Age: 75
Discharge: HOME | End: 2025-08-29
Payer: MEDICARE

## 2025-08-29 DIAGNOSIS — E11.9 TYPE 2 DIABETES MELLITUS WITHOUT COMPLICATION, WITHOUT LONG-TERM CURRENT USE OF INSULIN (HCC): ICD-10-CM

## 2025-08-29 DIAGNOSIS — E11.42 TYPE 2 DIABETES MELLITUS WITH DIABETIC POLYNEUROPATHY, WITHOUT LONG-TERM CURRENT USE OF INSULIN (HCC): ICD-10-CM

## 2025-08-29 DIAGNOSIS — I10 HTN (HYPERTENSION), BENIGN: ICD-10-CM

## 2025-08-29 DIAGNOSIS — E61.1 IRON DEFICIENCY: ICD-10-CM

## 2025-08-29 DIAGNOSIS — R73.01 IFG (IMPAIRED FASTING GLUCOSE): ICD-10-CM

## 2025-08-29 DIAGNOSIS — N30.00 ACUTE CYSTITIS WITHOUT HEMATURIA: ICD-10-CM

## 2025-08-29 DIAGNOSIS — E78.2 MIXED HYPERLIPIDEMIA: ICD-10-CM

## 2025-08-29 DIAGNOSIS — E03.9 ACQUIRED HYPOTHYROIDISM: ICD-10-CM

## 2025-08-29 DIAGNOSIS — Z01.818 PRE-OPERATIVE CLEARANCE: ICD-10-CM

## 2025-08-29 LAB
ALBUMIN SERPL-MCNC: 3.8 G/DL (ref 3.5–5.2)
ALP SERPL-CCNC: 78 U/L (ref 35–104)
ALT SERPL-CCNC: 15 U/L (ref 0–35)
ANION GAP SERPL CALCULATED.3IONS-SCNC: 10 MMOL/L (ref 7–16)
AST SERPL-CCNC: 23 U/L (ref 0–35)
BASOPHILS # BLD: 0.02 K/UL (ref 0–0.2)
BASOPHILS NFR BLD: 0 % (ref 0–2)
BILIRUB SERPL-MCNC: 0.3 MG/DL (ref 0–1.2)
BUN SERPL-MCNC: 16 MG/DL (ref 8–23)
CALCIUM SERPL-MCNC: 9.8 MG/DL (ref 8.8–10.2)
CHLORIDE SERPL-SCNC: 106 MMOL/L (ref 98–107)
CHOLEST SERPL-MCNC: 162 MG/DL
CO2 SERPL-SCNC: 28 MMOL/L (ref 22–29)
CREAT SERPL-MCNC: 0.8 MG/DL (ref 0.5–1)
EOSINOPHIL # BLD: 0.08 K/UL (ref 0.05–0.5)
EOSINOPHILS RELATIVE PERCENT: 2 % (ref 0–6)
ERYTHROCYTE [DISTWIDTH] IN BLOOD BY AUTOMATED COUNT: 14.6 % (ref 11.5–15)
GFR, ESTIMATED: 71 ML/MIN/1.73M2
GLUCOSE SERPL-MCNC: 96 MG/DL (ref 74–99)
HBA1C MFR BLD: 6.1 % (ref 4–5.6)
HCT VFR BLD AUTO: 42.3 % (ref 34–48)
HDLC SERPL-MCNC: 58 MG/DL
HGB BLD-MCNC: 13.3 G/DL (ref 11.5–15.5)
IMM GRANULOCYTES # BLD AUTO: <0.03 K/UL (ref 0–0.58)
IMM GRANULOCYTES NFR BLD: 0 % (ref 0–5)
LDLC SERPL CALC-MCNC: 89 MG/DL
LYMPHOCYTES NFR BLD: 1.34 K/UL (ref 1.5–4)
LYMPHOCYTES RELATIVE PERCENT: 25 % (ref 20–42)
MCH RBC QN AUTO: 28.8 PG (ref 26–35)
MCHC RBC AUTO-ENTMCNC: 31.4 G/DL (ref 32–34.5)
MCV RBC AUTO: 91.6 FL (ref 80–99.9)
MONOCYTES NFR BLD: 0.45 K/UL (ref 0.1–0.95)
MONOCYTES NFR BLD: 8 % (ref 2–12)
NEUTROPHILS NFR BLD: 65 % (ref 43–80)
NEUTS SEG NFR BLD: 3.5 K/UL (ref 1.8–7.3)
PLATELET # BLD AUTO: 236 K/UL (ref 130–450)
PMV BLD AUTO: 11.9 FL (ref 7–12)
POTASSIUM SERPL-SCNC: 4.7 MMOL/L (ref 3.5–5.1)
PROT SERPL-MCNC: 6.8 G/DL (ref 6.4–8.3)
RBC # BLD AUTO: 4.62 M/UL (ref 3.5–5.5)
SODIUM SERPL-SCNC: 145 MMOL/L (ref 136–145)
T4 FREE SERPL-MCNC: 0.8 NG/DL (ref 0.9–1.7)
TRIGL SERPL-MCNC: 75 MG/DL
TSH SERPL DL<=0.05 MIU/L-ACNC: 1.79 UIU/ML (ref 0.27–4.2)
VLDLC SERPL CALC-MCNC: 15 MG/DL
WBC OTHER # BLD: 5.4 K/UL (ref 4.5–11.5)

## 2025-08-29 PROCEDURE — 80053 COMPREHEN METABOLIC PANEL: CPT

## 2025-08-29 PROCEDURE — 84439 ASSAY OF FREE THYROXINE: CPT

## 2025-08-29 PROCEDURE — 83036 HEMOGLOBIN GLYCOSYLATED A1C: CPT

## 2025-08-29 PROCEDURE — 80061 LIPID PANEL: CPT

## 2025-08-29 PROCEDURE — 93005 ELECTROCARDIOGRAM TRACING: CPT

## 2025-08-29 PROCEDURE — 84443 ASSAY THYROID STIM HORMONE: CPT

## 2025-08-29 PROCEDURE — 85025 COMPLETE CBC W/AUTO DIFF WBC: CPT

## 2025-08-29 PROCEDURE — 36415 COLL VENOUS BLD VENIPUNCTURE: CPT

## 2025-08-30 LAB
EKG ATRIAL RATE: 64 BPM
EKG P AXIS: 38 DEGREES
EKG P-R INTERVAL: 156 MS
EKG Q-T INTERVAL: 410 MS
EKG QRS DURATION: 86 MS
EKG QTC CALCULATION (BAZETT): 422 MS
EKG R AXIS: 1 DEGREES
EKG T AXIS: 32 DEGREES
EKG VENTRICULAR RATE: 64 BPM

## 2025-09-03 ENCOUNTER — HOSPITAL ENCOUNTER (OUTPATIENT)
Dept: LAB | Age: 75
Discharge: HOME | End: 2025-09-03
Payer: MEDICARE

## 2025-09-03 LAB
INR PPP: 1.1
PARTIAL THROMBOPLASTIN TIME: 37.2 SEC (ref 24.5–35.1)
PROTHROMBIN TIME: 11.5 SEC (ref 9.3–12.4)

## 2025-09-03 PROCEDURE — 36415 COLL VENOUS BLD VENIPUNCTURE: CPT

## 2025-09-03 PROCEDURE — 85730 THROMBOPLASTIN TIME PARTIAL: CPT

## 2025-09-03 PROCEDURE — 85610 PROTHROMBIN TIME: CPT

## (undated) DEVICE — 3 ML SYRINGE LUER-LOCK TIP: Brand: MONOJECT

## (undated) DEVICE — GAUZE,SPONGE,4"X4",12PLY,STERILE,LF,2'S: Brand: MEDLINE

## (undated) DEVICE — NON-DEHP CATHETER EXTENSION SET, MALE LUER LOCK ADAPTER

## (undated) DEVICE — NEEDLE HYPO 18GA L1.5IN PNK POLYPR HUB S STL THN WALL FILL

## (undated) DEVICE — BANDAGE ADH W1XL3IN NAT FAB WVN FLX DURABLE N ADH PD SEAL

## (undated) DEVICE — Device: Brand: PORTEX

## (undated) DEVICE — GLOVE ORANGE PI 8   MSG9080

## (undated) DEVICE — GLOVE ORANGE PI 7 1/2   MSG9075

## (undated) DEVICE — 6 ML SYRINGE LUER-LOCK TIP: Brand: MONOJECT

## (undated) DEVICE — 3M™ RED DOT™ MONITORING ELECTRODE WITH FOAM TAPE AND STICKY GEL 2560, 50/BAG, 20/CASE, 72/PLT: Brand: RED DOT™

## (undated) DEVICE — 12 ML SYRINGE,LUER-LOCK TIP: Brand: MONOJECT

## (undated) DEVICE — NEEDLE HYPO 25GA L1.5IN BLU POLYPR HUB S STL REG BVL STR

## (undated) DEVICE — Z DISCONTINUED APPLICATOR SURG PREP 0.35OZ 2% CHG 70% ISO ALC W/ HI LT

## (undated) DEVICE — TRAY EPI SGL DOSE 18GA NDL CUST AULTMAN HOSP